# Patient Record
Sex: FEMALE | Race: WHITE | NOT HISPANIC OR LATINO | Employment: FULL TIME | URBAN - NONMETROPOLITAN AREA
[De-identification: names, ages, dates, MRNs, and addresses within clinical notes are randomized per-mention and may not be internally consistent; named-entity substitution may affect disease eponyms.]

---

## 2021-11-08 ENCOUNTER — TRANSCRIBE ORDERS (OUTPATIENT)
Dept: LAB | Facility: HOSPITAL | Age: 56
End: 2021-11-08

## 2021-11-08 ENCOUNTER — LAB (OUTPATIENT)
Dept: LAB | Facility: HOSPITAL | Age: 56
End: 2021-11-08

## 2021-11-08 DIAGNOSIS — Z79.891 ENCOUNTER FOR LONG-TERM METHADONE USE: ICD-10-CM

## 2021-11-08 DIAGNOSIS — Z79.891 ENCOUNTER FOR LONG-TERM METHADONE USE: Primary | ICD-10-CM

## 2021-11-08 PROCEDURE — 82607 VITAMIN B-12: CPT

## 2021-11-08 PROCEDURE — 85652 RBC SED RATE AUTOMATED: CPT

## 2021-11-08 PROCEDURE — 80053 COMPREHEN METABOLIC PANEL: CPT

## 2021-11-08 PROCEDURE — 36415 COLL VENOUS BLD VENIPUNCTURE: CPT

## 2021-11-08 PROCEDURE — 86431 RHEUMATOID FACTOR QUANT: CPT

## 2021-11-08 PROCEDURE — 85027 COMPLETE CBC AUTOMATED: CPT

## 2021-11-08 PROCEDURE — 82306 VITAMIN D 25 HYDROXY: CPT

## 2021-11-09 LAB
25(OH)D3 SERPL-MCNC: 17.1 NG/ML (ref 30–100)
ALBUMIN SERPL-MCNC: 4.3 G/DL (ref 3.5–5.2)
ALBUMIN/GLOB SERPL: 1.8 G/DL
ALP SERPL-CCNC: 44 U/L (ref 39–117)
ALT SERPL W P-5'-P-CCNC: 17 U/L (ref 1–33)
ANION GAP SERPL CALCULATED.3IONS-SCNC: 9.7 MMOL/L (ref 5–15)
AST SERPL-CCNC: 17 U/L (ref 1–32)
BILIRUB SERPL-MCNC: 0.2 MG/DL (ref 0–1.2)
BUN SERPL-MCNC: 28 MG/DL (ref 6–20)
BUN/CREAT SERPL: 28 (ref 7–25)
CALCIUM SPEC-SCNC: 10.1 MG/DL (ref 8.6–10.5)
CHLORIDE SERPL-SCNC: 101 MMOL/L (ref 98–107)
CHROMATIN AB SERPL-ACNC: <10 IU/ML (ref 0–14)
CO2 SERPL-SCNC: 28.3 MMOL/L (ref 22–29)
CREAT SERPL-MCNC: 1 MG/DL (ref 0.57–1)
DEPRECATED RDW RBC AUTO: 45.6 FL (ref 37–54)
ERYTHROCYTE [DISTWIDTH] IN BLOOD BY AUTOMATED COUNT: 13.5 % (ref 12.3–15.4)
ERYTHROCYTE [SEDIMENTATION RATE] IN BLOOD: 21 MM/HR (ref 0–30)
GFR SERPL CREATININE-BSD FRML MDRD: 58 ML/MIN/1.73
GLOBULIN UR ELPH-MCNC: 2.4 GM/DL
GLUCOSE SERPL-MCNC: 97 MG/DL (ref 65–99)
HCT VFR BLD AUTO: 44.2 % (ref 34–46.6)
HGB BLD-MCNC: 14.7 G/DL (ref 12–15.9)
MCH RBC QN AUTO: 30.2 PG (ref 26.6–33)
MCHC RBC AUTO-ENTMCNC: 33.3 G/DL (ref 31.5–35.7)
MCV RBC AUTO: 90.9 FL (ref 79–97)
PLATELET # BLD AUTO: 278 10*3/MM3 (ref 140–450)
PMV BLD AUTO: 11.8 FL (ref 6–12)
POTASSIUM SERPL-SCNC: 4.7 MMOL/L (ref 3.5–5.2)
PROT SERPL-MCNC: 6.7 G/DL (ref 6–8.5)
RBC # BLD AUTO: 4.86 10*6/MM3 (ref 3.77–5.28)
SODIUM SERPL-SCNC: 139 MMOL/L (ref 136–145)
VIT B12 BLD-MCNC: 350 PG/ML (ref 211–946)
WBC # BLD AUTO: 9.63 10*3/MM3 (ref 3.4–10.8)

## 2023-07-28 ENCOUNTER — APPOINTMENT (OUTPATIENT)
Dept: CT IMAGING | Facility: HOSPITAL | Age: 58
DRG: 246 | End: 2023-07-28
Payer: COMMERCIAL

## 2023-07-28 ENCOUNTER — APPOINTMENT (OUTPATIENT)
Dept: GENERAL RADIOLOGY | Facility: HOSPITAL | Age: 58
DRG: 246 | End: 2023-07-28
Payer: COMMERCIAL

## 2023-07-28 ENCOUNTER — HOSPITAL ENCOUNTER (INPATIENT)
Facility: HOSPITAL | Age: 58
LOS: 2 days | Discharge: HOME | DRG: 246 | End: 2023-07-30
Attending: FAMILY MEDICINE | Admitting: INTERNAL MEDICINE
Payer: COMMERCIAL

## 2023-07-28 DIAGNOSIS — I21.4 NSTEMI (NON-ST ELEVATED MYOCARDIAL INFARCTION): Primary | ICD-10-CM

## 2023-07-28 PROBLEM — G89.4 CHRONIC PAIN SYNDROME: Status: ACTIVE | Noted: 2023-07-28

## 2023-07-28 PROBLEM — F17.200 TOBACCO DEPENDENCE: Status: ACTIVE | Noted: 2023-07-28

## 2023-07-28 PROBLEM — I25.9 CHEST PAIN DUE TO MYOCARDIAL ISCHEMIA: Status: ACTIVE | Noted: 2023-07-28

## 2023-07-28 PROBLEM — J44.9 COPD (CHRONIC OBSTRUCTIVE PULMONARY DISEASE): Status: ACTIVE | Noted: 2023-07-28

## 2023-07-28 LAB
ANION GAP SERPL CALCULATED.3IONS-SCNC: 15 MMOL/L (ref 5–15)
APTT PPP: 24.9 SECONDS (ref 24.1–35)
BUN SERPL-MCNC: 8 MG/DL (ref 6–20)
BUN/CREAT SERPL: 13.1 (ref 7–25)
CALCIUM SPEC-SCNC: 9 MG/DL (ref 8.6–10.5)
CHLORIDE SERPL-SCNC: 102 MMOL/L (ref 98–107)
CK SERPL-CCNC: 705 U/L (ref 20–180)
CO2 SERPL-SCNC: 22 MMOL/L (ref 22–29)
CREAT SERPL-MCNC: 0.61 MG/DL (ref 0.57–1)
D DIMER PPP FEU-MCNC: 1.33 MCGFEU/ML (ref 0–0.55)
DEPRECATED RDW RBC AUTO: 49 FL (ref 37–54)
EGFRCR SERPLBLD CKD-EPI 2021: 105.7 ML/MIN/1.73
EOSINOPHIL # BLD MANUAL: 0.24 10*3/MM3 (ref 0–0.4)
EOSINOPHIL NFR BLD MANUAL: 2 % (ref 0.3–6.2)
ERYTHROCYTE [DISTWIDTH] IN BLOOD BY AUTOMATED COUNT: 14.4 % (ref 12.3–15.4)
GEN 5 2HR TROPONIN T REFLEX: 683 NG/L
GLUCOSE SERPL-MCNC: 84 MG/DL (ref 65–99)
HCT VFR BLD AUTO: 38.9 % (ref 34–46.6)
HGB BLD-MCNC: 12.2 G/DL (ref 12–15.9)
INR PPP: 0.85 (ref 0.91–1.09)
LYMPHOCYTES # BLD MANUAL: 4.56 10*3/MM3 (ref 0.7–3.1)
LYMPHOCYTES NFR BLD MANUAL: 6.9 % (ref 5–12)
MAGNESIUM SERPL-MCNC: 2 MG/DL (ref 1.6–2.6)
MCH RBC QN AUTO: 29.4 PG (ref 26.6–33)
MCHC RBC AUTO-ENTMCNC: 31.4 G/DL (ref 31.5–35.7)
MCV RBC AUTO: 93.7 FL (ref 79–97)
MONOCYTES # BLD: 0.82 10*3/MM3 (ref 0.1–0.9)
NEUTROPHILS # BLD AUTO: 6.21 10*3/MM3 (ref 1.7–7)
NEUTROPHILS NFR BLD MANUAL: 52.5 % (ref 42.7–76)
NT-PROBNP SERPL-MCNC: 2373 PG/ML (ref 0–900)
PLAT MORPH BLD: NORMAL
PLATELET # BLD AUTO: 280 10*3/MM3 (ref 140–450)
PMV BLD AUTO: 10 FL (ref 6–12)
POLYCHROMASIA BLD QL SMEAR: ABNORMAL
POTASSIUM SERPL-SCNC: 3.5 MMOL/L (ref 3.5–5.2)
PROTHROMBIN TIME: 11.7 SECONDS (ref 11.8–14.8)
RBC # BLD AUTO: 4.15 10*6/MM3 (ref 3.77–5.28)
SODIUM SERPL-SCNC: 139 MMOL/L (ref 136–145)
TROPONIN T DELTA: 120 NG/L
TROPONIN T SERPL HS-MCNC: 563 NG/L
VARIANT LYMPHS NFR BLD MANUAL: 3 % (ref 0–5)
VARIANT LYMPHS NFR BLD MANUAL: 35.6 % (ref 19.6–45.3)
WBC MORPH BLD: NORMAL
WBC NRBC COR # BLD: 11.82 10*3/MM3 (ref 3.4–10.8)

## 2023-07-28 PROCEDURE — 85730 THROMBOPLASTIN TIME PARTIAL: CPT | Performed by: FAMILY MEDICINE

## 2023-07-28 PROCEDURE — 25010000002 ENOXAPARIN PER 10 MG: Performed by: FAMILY MEDICINE

## 2023-07-28 PROCEDURE — 85007 BL SMEAR W/DIFF WBC COUNT: CPT | Performed by: FAMILY MEDICINE

## 2023-07-28 PROCEDURE — 71275 CT ANGIOGRAPHY CHEST: CPT

## 2023-07-28 PROCEDURE — 93010 ELECTROCARDIOGRAM REPORT: CPT | Performed by: INTERNAL MEDICINE

## 2023-07-28 PROCEDURE — 82550 ASSAY OF CK (CPK): CPT | Performed by: FAMILY MEDICINE

## 2023-07-28 PROCEDURE — 85610 PROTHROMBIN TIME: CPT | Performed by: FAMILY MEDICINE

## 2023-07-28 PROCEDURE — 85379 FIBRIN DEGRADATION QUANT: CPT | Performed by: FAMILY MEDICINE

## 2023-07-28 PROCEDURE — 25510000001 IOPAMIDOL PER 1 ML: Performed by: FAMILY MEDICINE

## 2023-07-28 PROCEDURE — 93005 ELECTROCARDIOGRAM TRACING: CPT | Performed by: NURSE PRACTITIONER

## 2023-07-28 PROCEDURE — 93005 ELECTROCARDIOGRAM TRACING: CPT | Performed by: FAMILY MEDICINE

## 2023-07-28 PROCEDURE — 99285 EMERGENCY DEPT VISIT HI MDM: CPT

## 2023-07-28 PROCEDURE — 83735 ASSAY OF MAGNESIUM: CPT | Performed by: FAMILY MEDICINE

## 2023-07-28 PROCEDURE — 80048 BASIC METABOLIC PNL TOTAL CA: CPT | Performed by: FAMILY MEDICINE

## 2023-07-28 PROCEDURE — 71045 X-RAY EXAM CHEST 1 VIEW: CPT

## 2023-07-28 PROCEDURE — 85025 COMPLETE CBC W/AUTO DIFF WBC: CPT | Performed by: FAMILY MEDICINE

## 2023-07-28 PROCEDURE — 99222 1ST HOSP IP/OBS MODERATE 55: CPT | Performed by: INTERNAL MEDICINE

## 2023-07-28 PROCEDURE — 84484 ASSAY OF TROPONIN QUANT: CPT | Performed by: FAMILY MEDICINE

## 2023-07-28 PROCEDURE — 83880 ASSAY OF NATRIURETIC PEPTIDE: CPT | Performed by: FAMILY MEDICINE

## 2023-07-28 PROCEDURE — 25010000002 NITROGLYCERIN 200 MCG/ML SOLUTION: Performed by: FAMILY MEDICINE

## 2023-07-28 RX ORDER — AMOXICILLIN 250 MG
1 CAPSULE ORAL NIGHTLY PRN
Status: DISCONTINUED | OUTPATIENT
Start: 2023-07-28 | End: 2023-07-30 | Stop reason: HOSPADM

## 2023-07-28 RX ORDER — HYDROCODONE BITARTRATE AND ACETAMINOPHEN 10; 325 MG/1; MG/1
1 TABLET ORAL EVERY 6 HOURS PRN
Status: DISCONTINUED | OUTPATIENT
Start: 2023-07-28 | End: 2023-07-30 | Stop reason: HOSPADM

## 2023-07-28 RX ORDER — FLUOXETINE HYDROCHLORIDE 20 MG/1
40 CAPSULE ORAL DAILY
COMMUNITY

## 2023-07-28 RX ORDER — IBUPROFEN 800 MG/1
800 TABLET ORAL EVERY 6 HOURS PRN
COMMUNITY
End: 2023-07-30 | Stop reason: HOSPADM

## 2023-07-28 RX ORDER — TOPIRAMATE 50 MG/1
50 TABLET, FILM COATED ORAL 2 TIMES DAILY
COMMUNITY

## 2023-07-28 RX ORDER — FLUOXETINE HYDROCHLORIDE 20 MG/1
40 CAPSULE ORAL DAILY
Status: DISCONTINUED | OUTPATIENT
Start: 2023-07-29 | End: 2023-07-30 | Stop reason: HOSPADM

## 2023-07-28 RX ORDER — POLYETHYLENE GLYCOL 3350 17 G/17G
17 POWDER, FOR SOLUTION ORAL DAILY PRN
Status: DISCONTINUED | OUTPATIENT
Start: 2023-07-28 | End: 2023-07-30 | Stop reason: HOSPADM

## 2023-07-28 RX ORDER — ASPIRIN 81 MG/1
81 TABLET ORAL DAILY
Status: DISCONTINUED | OUTPATIENT
Start: 2023-07-28 | End: 2023-07-30

## 2023-07-28 RX ORDER — BISACODYL 10 MG
10 SUPPOSITORY, RECTAL RECTAL DAILY PRN
Status: DISCONTINUED | OUTPATIENT
Start: 2023-07-28 | End: 2023-07-30 | Stop reason: HOSPADM

## 2023-07-28 RX ORDER — METHYLPREDNISOLONE 4 MG/1
TABLET ORAL 2 TIMES DAILY
COMMUNITY
End: 2023-07-30 | Stop reason: HOSPADM

## 2023-07-28 RX ORDER — POTASSIUM CHLORIDE 750 MG/1
20 CAPSULE, EXTENDED RELEASE ORAL ONCE
Status: COMPLETED | OUTPATIENT
Start: 2023-07-28 | End: 2023-07-28

## 2023-07-28 RX ORDER — SODIUM CHLORIDE 9 MG/ML
40 INJECTION, SOLUTION INTRAVENOUS AS NEEDED
Status: DISCONTINUED | OUTPATIENT
Start: 2023-07-28 | End: 2023-07-29 | Stop reason: SDUPTHER

## 2023-07-28 RX ORDER — TOPIRAMATE 25 MG/1
50 TABLET ORAL 2 TIMES DAILY
Status: DISCONTINUED | OUTPATIENT
Start: 2023-07-28 | End: 2023-07-30 | Stop reason: HOSPADM

## 2023-07-28 RX ORDER — CARVEDILOL 6.25 MG/1
6.25 TABLET ORAL 2 TIMES DAILY WITH MEALS
Status: DISCONTINUED | OUTPATIENT
Start: 2023-07-28 | End: 2023-07-30 | Stop reason: HOSPADM

## 2023-07-28 RX ORDER — ONDANSETRON 4 MG/1
4 TABLET, FILM COATED ORAL EVERY 6 HOURS PRN
Status: DISCONTINUED | OUTPATIENT
Start: 2023-07-28 | End: 2023-07-29 | Stop reason: SDUPTHER

## 2023-07-28 RX ORDER — ONDANSETRON 2 MG/ML
4 INJECTION INTRAMUSCULAR; INTRAVENOUS EVERY 6 HOURS PRN
Status: DISCONTINUED | OUTPATIENT
Start: 2023-07-28 | End: 2023-07-29 | Stop reason: SDUPTHER

## 2023-07-28 RX ORDER — PANTOPRAZOLE SODIUM 40 MG/1
40 TABLET, DELAYED RELEASE ORAL 2 TIMES DAILY
COMMUNITY

## 2023-07-28 RX ORDER — CHLORHEXIDINE GLUCONATE 500 MG/1
1 CLOTH TOPICAL ONCE
Status: COMPLETED | OUTPATIENT
Start: 2023-07-28 | End: 2023-07-29

## 2023-07-28 RX ORDER — HYDROCODONE BITARTRATE AND ACETAMINOPHEN 10; 325 MG/1; MG/1
1 TABLET ORAL EVERY 6 HOURS PRN
COMMUNITY

## 2023-07-28 RX ORDER — SODIUM CHLORIDE 0.9 % (FLUSH) 0.9 %
10 SYRINGE (ML) INJECTION AS NEEDED
Status: DISCONTINUED | OUTPATIENT
Start: 2023-07-28 | End: 2023-07-29 | Stop reason: SDUPTHER

## 2023-07-28 RX ORDER — FUROSEMIDE 40 MG/1
40 TABLET ORAL DAILY PRN
COMMUNITY

## 2023-07-28 RX ORDER — PANTOPRAZOLE SODIUM 40 MG/1
40 TABLET, DELAYED RELEASE ORAL 2 TIMES DAILY
Status: DISCONTINUED | OUTPATIENT
Start: 2023-07-28 | End: 2023-07-30 | Stop reason: HOSPADM

## 2023-07-28 RX ORDER — ZOLPIDEM TARTRATE 10 MG/1
10 TABLET ORAL NIGHTLY PRN
COMMUNITY

## 2023-07-28 RX ORDER — GABAPENTIN 300 MG/1
300 CAPSULE ORAL 3 TIMES DAILY
Status: DISCONTINUED | OUTPATIENT
Start: 2023-07-28 | End: 2023-07-30 | Stop reason: HOSPADM

## 2023-07-28 RX ORDER — BISACODYL 5 MG/1
5 TABLET, DELAYED RELEASE ORAL DAILY PRN
Status: DISCONTINUED | OUTPATIENT
Start: 2023-07-28 | End: 2023-07-30 | Stop reason: HOSPADM

## 2023-07-28 RX ORDER — SODIUM CHLORIDE 0.9 % (FLUSH) 0.9 %
10 SYRINGE (ML) INJECTION EVERY 12 HOURS SCHEDULED
Status: DISCONTINUED | OUTPATIENT
Start: 2023-07-28 | End: 2023-07-29 | Stop reason: SDUPTHER

## 2023-07-28 RX ORDER — NICOTINE 21 MG/24HR
1 PATCH, TRANSDERMAL 24 HOURS TRANSDERMAL
Status: DISCONTINUED | OUTPATIENT
Start: 2023-07-29 | End: 2023-07-30 | Stop reason: HOSPADM

## 2023-07-28 RX ORDER — NITROGLYCERIN 20 MG/100ML
5-200 INJECTION INTRAVENOUS
Status: DISCONTINUED | OUTPATIENT
Start: 2023-07-28 | End: 2023-07-30

## 2023-07-28 RX ORDER — SODIUM CHLORIDE 0.9 % (FLUSH) 0.9 %
10 SYRINGE (ML) INJECTION AS NEEDED
Status: DISCONTINUED | OUTPATIENT
Start: 2023-07-28 | End: 2023-07-30 | Stop reason: HOSPADM

## 2023-07-28 RX ORDER — NITROGLYCERIN 0.4 MG/1
0.4 TABLET SUBLINGUAL
Status: DISCONTINUED | OUTPATIENT
Start: 2023-07-28 | End: 2023-07-29

## 2023-07-28 RX ORDER — SPIRONOLACTONE 25 MG/1
25 TABLET ORAL DAILY
Status: DISCONTINUED | OUTPATIENT
Start: 2023-07-28 | End: 2023-07-30 | Stop reason: HOSPADM

## 2023-07-28 RX ORDER — ZOLPIDEM TARTRATE 5 MG/1
10 TABLET ORAL NIGHTLY PRN
Status: DISCONTINUED | OUTPATIENT
Start: 2023-07-28 | End: 2023-07-30 | Stop reason: HOSPADM

## 2023-07-28 RX ORDER — ONDANSETRON 4 MG/1
4 TABLET, FILM COATED ORAL EVERY 8 HOURS PRN
COMMUNITY

## 2023-07-28 RX ORDER — ENOXAPARIN SODIUM 100 MG/ML
1 INJECTION SUBCUTANEOUS EVERY 12 HOURS SCHEDULED
Status: DISCONTINUED | OUTPATIENT
Start: 2023-07-29 | End: 2023-07-29

## 2023-07-28 RX ORDER — ACETAMINOPHEN 160 MG/5ML
650 SOLUTION ORAL EVERY 4 HOURS PRN
Status: DISCONTINUED | OUTPATIENT
Start: 2023-07-28 | End: 2023-07-30 | Stop reason: HOSPADM

## 2023-07-28 RX ORDER — GABAPENTIN 300 MG/1
300 CAPSULE ORAL 3 TIMES DAILY
COMMUNITY

## 2023-07-28 RX ORDER — CHLORHEXIDINE GLUCONATE 500 MG/1
1 CLOTH TOPICAL EVERY 24 HOURS
Status: DISCONTINUED | OUTPATIENT
Start: 2023-07-29 | End: 2023-07-30 | Stop reason: HOSPADM

## 2023-07-28 RX ORDER — ENOXAPARIN SODIUM 100 MG/ML
1 INJECTION SUBCUTANEOUS ONCE
Status: COMPLETED | OUTPATIENT
Start: 2023-07-28 | End: 2023-07-28

## 2023-07-28 RX ORDER — PREDNISONE 10 MG/1
10 TABLET ORAL
Status: DISCONTINUED | OUTPATIENT
Start: 2023-07-29 | End: 2023-07-30 | Stop reason: HOSPADM

## 2023-07-28 RX ORDER — ACETAMINOPHEN 650 MG/1
650 SUPPOSITORY RECTAL EVERY 4 HOURS PRN
Status: DISCONTINUED | OUTPATIENT
Start: 2023-07-28 | End: 2023-07-30 | Stop reason: HOSPADM

## 2023-07-28 RX ORDER — ACETAMINOPHEN 325 MG/1
650 TABLET ORAL EVERY 4 HOURS PRN
Status: DISCONTINUED | OUTPATIENT
Start: 2023-07-28 | End: 2023-07-29 | Stop reason: SDUPTHER

## 2023-07-28 RX ADMIN — POTASSIUM CHLORIDE 20 MEQ: 10 CAPSULE, COATED, EXTENDED RELEASE ORAL at 23:31

## 2023-07-28 RX ADMIN — ZOLPIDEM TARTRATE 10 MG: 5 TABLET ORAL at 23:31

## 2023-07-28 RX ADMIN — CARVEDILOL 6.25 MG: 6.25 TABLET, FILM COATED ORAL at 23:31

## 2023-07-28 RX ADMIN — Medication 10 ML: at 23:33

## 2023-07-28 RX ADMIN — PANTOPRAZOLE SODIUM 40 MG: 40 TABLET, DELAYED RELEASE ORAL at 23:31

## 2023-07-28 RX ADMIN — GABAPENTIN 300 MG: 300 CAPSULE ORAL at 23:31

## 2023-07-28 RX ADMIN — IOPAMIDOL 100 ML: 755 INJECTION, SOLUTION INTRAVENOUS at 18:52

## 2023-07-28 RX ADMIN — TOPIRAMATE 50 MG: 25 TABLET, FILM COATED ORAL at 23:31

## 2023-07-28 RX ADMIN — NITROGLYCERIN 5 MCG/MIN: 20 INJECTION INTRAVENOUS at 19:07

## 2023-07-28 RX ADMIN — ENOXAPARIN SODIUM 70 MG: 100 INJECTION SUBCUTANEOUS at 21:04

## 2023-07-28 RX ADMIN — SPIRONOLACTONE 25 MG: 25 TABLET ORAL at 23:31

## 2023-07-28 RX ADMIN — HYDROCODONE BITARTRATE AND ACETAMINOPHEN 1 TABLET: 10; 325 TABLET ORAL at 23:30

## 2023-07-28 NOTE — LETTER
July 30, 2023     Patient: Alannah Barr   YOB: 1965   Date of Visit: 7/28/2023       To Whom It May Concern:    It is my medical opinion that Alannah Barr should be excused from work 7/28/23-8/6/23. She can return to work 8/7/23.           Sincerely,        Rain Broussard RN

## 2023-07-28 NOTE — Clinical Note
First balloon inflation max pressure = 12 tere. First balloon inflation duration = 20 seconds. Second inflation of balloon - Max pressure = 18 tere. 2nd Inflation of balloon - Duration = 20 seconds. 2nd inflation was done at 10:52 CDT. Third inflation of balloon - Max pressure = 18 tere. 3rd Inflation of balloon - Duration = 20 seconds. 3rd inflation was done at 10:52 CDT.

## 2023-07-28 NOTE — Clinical Note
First balloon inflation max pressure = 6 tere. First balloon inflation duration = 20 seconds. Second inflation of balloon - Max pressure = 8 tere. 2nd Inflation of balloon - Duration = 20 seconds. 2nd inflation was done at 10:22 CDT. Third inflation of balloon - Max pressure = 12 tere. 3rd Inflation of balloon - Duration = 20 seconds.

## 2023-07-28 NOTE — Clinical Note
First balloon inflation max pressure = 14 tere. First balloon inflation duration = 25 seconds. Second inflation of balloon - Max pressure = 14 tere. 2nd Inflation of balloon - Duration = 20 seconds. Third inflation of balloon - Max pressure = 16 tere. 3rd Inflation of balloon - Duration = 20 seconds. Fourth inflation of balloon - Max pressure = 18 tere. 4th Inflation of balloon - Duration = 25 seconds.

## 2023-07-28 NOTE — Clinical Note
First balloon inflation max pressure = 6 tere. First balloon inflation duration = 20 seconds. Second inflation of balloon - Max pressure = 2 tere. 2nd Inflation of balloon - Duration = 20 seconds. 2nd inflation was done at 10:10 CDT.

## 2023-07-28 NOTE — ED PROVIDER NOTES
Subjective   History of Present Illness  57-year-old female was transferred here from an outside hospital.  Patient was found to have an elevated troponin level.  Patient's been having chest pain for last few days.  Patient states that it worsened last night.  Patient states that she started feel nauseous.  Patient states that chest pain radiates to her back.  Patient denies any fevers or chills.  Patient denies any vomiting.  Patient denies any abdominal pain.  Patient denies any other symptoms at this time.    Review of Systems   Cardiovascular:  Positive for chest pain.   Gastrointestinal:  Positive for nausea.   Musculoskeletal:  Positive for back pain.   All other systems reviewed and are negative.    Past Medical History:   Diagnosis Date    CHF (congestive heart failure)     COPD (chronic obstructive pulmonary disease)        Allergies   Allergen Reactions    Levaquin [Levofloxacin] Shortness Of Breath    Morphine Shortness Of Breath    Codeine Unknown - High Severity    Doxycycline Unknown - High Severity    Metronidazole Unknown - High Severity    Naproxen Unknown - High Severity    Propoxyphene Unknown - High Severity    Tetanus Toxoids Unknown - High Severity    Tetracyclines & Related Unknown - High Severity       History reviewed. No pertinent surgical history.    History reviewed. No pertinent family history.    Social History     Socioeconomic History    Marital status: Single   Tobacco Use    Smoking status: Every Day     Types: Cigarettes           Objective   Physical Exam  Vitals and nursing note reviewed.   Constitutional:       Appearance: She is well-developed.   HENT:      Head: Normocephalic and atraumatic.   Cardiovascular:      Rate and Rhythm: Normal rate and regular rhythm.      Heart sounds: Normal heart sounds.   Pulmonary:      Effort: Pulmonary effort is normal.      Breath sounds: Normal breath sounds.   Abdominal:      General: Bowel sounds are normal.      Palpations: Abdomen is  soft.      Tenderness: There is no abdominal tenderness.   Skin:     General: Skin is warm and dry.   Neurological:      General: No focal deficit present.      Mental Status: She is alert and oriented to person, place, and time.   Psychiatric:         Mood and Affect: Mood normal.         Behavior: Behavior normal.       Procedures           ED Course  ED Course as of 07/28/23 2029 Fri Jul 28, 2023   1852 EKG rate 76  Normal sinus rhythm  No STEMI [RP]      ED Course User Index  [RP] Raz Ochoa MD                      HEART Score: 6                    Lab Results (last 24 hours)       Procedure Component Value Units Date/Time    CBC & Differential [[id number]]  (Abnormal) Collected: 07/28/23 1817    Specimen: Blood Updated: 07/28/23 1856    Narrative:      The following orders were created for panel order CBC & Differential.  Procedure                               Abnormality         Status                     ---------                               -----------         ------                     CBC Auto Differential[[id number]]        Abnormal            Final result                 Please view results for these tests on the individual orders.    Protime-INR [[id number]]  (Abnormal) Collected: 07/28/23 1817    Specimen: Blood Updated: 07/28/23 1841     Protime 11.7 Seconds      INR 0.85    aPTT [[id number]]  (Normal) Collected: 07/28/23 1817    Specimen: Blood Updated: 07/28/23 1841     PTT 24.9 seconds     Basic Metabolic Panel [[id number]]  (Normal) Collected: 07/28/23 1817    Specimen: Blood Updated: 07/28/23 1849     Glucose 84 mg/dL      BUN 8 mg/dL      Creatinine 0.61 mg/dL      Sodium 139 mmol/L      Potassium 3.5 mmol/L      Chloride 102 mmol/L      CO2 22.0 mmol/L      Calcium 9.0 mg/dL      BUN/Creatinine Ratio 13.1     Anion Gap 15.0 mmol/L      eGFR 105.7 mL/min/1.73     Narrative:      GFR Normal >60  Chronic Kidney Disease <60  Kidney Failure <15      BNP [[id number]]  (Abnormal) Collected: 07/28/23  "1817    Specimen: Blood Updated: 07/28/23 1848     proBNP 2,373.0 pg/mL     Narrative:      Among patients with dyspnea, NT-proBNP is highly sensitive for the detection of acute congestive heart failure. In addition NT-proBNP of <300 pg/ml effectively rules out acute congestive heart failure with 99% negative predictive value.    Results may be falsely decreased if patient taking Biotin.      D-dimer, Quantitative [[id number]]  (Abnormal) Collected: 07/28/23 1817    Specimen: Blood Updated: 07/28/23 1841     D-Dimer, Quantitative 1.33 MCGFEU/mL     Narrative:      According to the assay 's published package insert, a normal (<0.50 MCGFEU/mL) D-dimer result in conjunction with a non-high clinical probability assessment, excludes deep vein thrombosis (DVT) and pulmonary embolism (PE) with high sensitivity.    D-dimer values increase with age and this can make VTE exclusion of an older population difficult. To address this, the [employer], based on best available evidence and recent guidelines, recommends that clinicians use age-adjusted D-dimer thresholds in patients greater than 50 years of age with: a) a low probability of PE who do not meet all Pulmonary Embolism Rule Out Criteria, or b) in those with intermediate probability of PE.   The formula for an age-adjusted D-dimer cut-off is \"age/100\".  For example, a 60 year old patient would have an age-adjusted cut-off of 0.60 MCGFEU/mL and an 80 year old 0.80 MCGFEU/mL.    High Sensitivity Troponin T [[id number]]  (Abnormal) Collected: 07/28/23 1817    Specimen: Blood Updated: 07/28/23 1849     HS Troponin T 563 ng/L     Narrative:      High Sensitive Troponin T Reference Range:  <10.0 ng/L- Negative Female for AMI  <15.0 ng/L- Negative Male for AMI  >=10 - Abnormal Female indicating possible myocardial injury.  >=15 - Abnormal Male indicating possible myocardial injury.   Clinicians would have to utilize clinical acumen, EKG, Troponin, " and serial changes to determine if it is an Acute Myocardial Infarction or myocardial injury due to an underlying chronic condition.         CK [[id number]]  (Abnormal) Collected: 07/28/23 1817    Specimen: Blood Updated: 07/28/23 1849     Creatine Kinase 705 U/L     Magnesium [[id number]]  (Normal) Collected: 07/28/23 1817    Specimen: Blood Updated: 07/28/23 1849     Magnesium 2.0 mg/dL     CBC Auto Differential [[id number]]  (Abnormal) Collected: 07/28/23 1817    Specimen: Blood Updated: 07/28/23 1856     WBC 11.82 10*3/mm3      RBC 4.15 10*6/mm3      Hemoglobin 12.2 g/dL      Hematocrit 38.9 %      MCV 93.7 fL      MCH 29.4 pg      MCHC 31.4 g/dL      RDW 14.4 %      RDW-SD 49.0 fl      MPV 10.0 fL      Platelets 280 10*3/mm3     Narrative:      The previously reported component NRBC is no longer being reported. Previous result was 0.0 /100 WBC (Reference Range: 0.0-0.2 /100 WBC) on 7/28/2023 at 1830 CDT.    Manual Differential [[id number]]  (Abnormal) Collected: 07/28/23 1817    Specimen: Blood Updated: 07/28/23 1856     Neutrophil % 52.5 %      Lymphocyte % 35.6 %      Monocyte % 6.9 %      Eosinophil % 2.0 %      Atypical Lymphocyte % 3.0 %      Neutrophils Absolute 6.21 10*3/mm3      Lymphocytes Absolute 4.56 10*3/mm3      Monocytes Absolute 0.82 10*3/mm3      Eosinophils Absolute 0.24 10*3/mm3      Polychromasia Mod/2+     WBC Morphology Normal     Platelet Morphology Normal    High Sensitivity Troponin T 2Hr [[id number]]  (Abnormal) Collected: 07/28/23 1959    Specimen: Blood Updated: 07/28/23 2029     HS Troponin T 683 ng/L      Troponin T Delta 120 ng/L     Narrative:      High Sensitive Troponin T Reference Range:  <10.0 ng/L- Negative Female for AMI  <15.0 ng/L- Negative Male for AMI  >=10 - Abnormal Female indicating possible myocardial injury.  >=15 - Abnormal Male indicating possible myocardial injury.   Clinicians would have to utilize clinical acumen, EKG, Troponin, and serial changes to determine if  it is an Acute Myocardial Infarction or myocardial injury due to an underlying chronic condition.                 CT Angiogram Chest   Final Result   1. No evidence of pulmonary thromboembolic disease. The thoracic aorta   is normal in caliber with no evidence of dissection or aneurysm. The   lungs are clear.   2. Coronary artery calcifications are present. No evidence of   cardiomegaly or pericardial effusion.   This report was finalized on 07/28/2023 19:18 by Dr. Devin Cornejo MD.      XR Chest 1 View   Final Result   Impression: No evidence of acute cardiopulmonary disease.   This report was finalized on 07/28/2023 18:43 by Dr. Devin Cornejo MD.          Medications   sodium chloride 0.9 % flush 10 mL (has no administration in time range)   nitroglycerin (TRIDIL) 200 mcg/ml infusion (10 mcg/min Intravenous Rate/Dose Change 7/28/23 1936)   Enoxaparin Sodium (LOVENOX) syringe 70 mg (has no administration in time range)   iopamidol (ISOVUE-370) 76 % injection 100 mL (100 mL Intravenous Given 7/28/23 1852)       Medical Decision Making  57-year-old female was transferred here from an outside hospital.  Patient was found to have an elevated troponin.  Patient troponin level was 563.  Patient was given Lovenox here in the emergency room.  Patient was started nitroglycerin drip.  Patient's chest pain was at a 3 out of 10 on the nitroglycerin drip.  I discussed case with Dr. Willett.  He has recommended patient be placed in the cardiac critical care unit.  I discussed case with Dr. Serrano who has accepted the patient under his services.    Problems Addressed:  NSTEMI (non-ST elevated myocardial infarction): complicated acute illness or injury    Amount and/or Complexity of Data Reviewed  Labs: ordered. Decision-making details documented in ED Course.  Radiology: ordered. Decision-making details documented in ED Course.  ECG/medicine tests: ordered. Decision-making details documented in ED  Course.  Discussion of management or test interpretation with external provider(s): Dr. Willett - Cardiology     Risk  Prescription drug management.  Decision regarding hospitalization.        Final diagnoses:   NSTEMI (non-ST elevated myocardial infarction)       ED Disposition  ED Disposition       ED Disposition   Decision to Admit    Condition   --    Comment   Level of Care: Critical Care [6]   Diagnosis: NSTEMI (non-ST elevated myocardial infarction) [892386]   Admitting Physician: KAMILAH QUINTANA [[id number]]   Attending Physician: KAMILAH QUINTANA [[id number]]   Bed Request Comments: Cardiac critical care unit   Certification: I Certify That Inpatient Hospital Services Are Medically Necessary For Greater Than 2 Midnights                 No follow-up provider specified.       Medication List      No changes were made to your prescriptions during this visit.            Raz Ochoa MD  07/28/23 2029

## 2023-07-28 NOTE — Clinical Note
First balloon inflation max pressure = 12 tere. First balloon inflation duration = 20 seconds. Second inflation of balloon - Max pressure = 16 tere. 2nd Inflation of balloon - Duration = 20 seconds. 2nd inflation was done at 10:55 CDT.

## 2023-07-29 ENCOUNTER — APPOINTMENT (OUTPATIENT)
Dept: CARDIOLOGY | Facility: HOSPITAL | Age: 58
DRG: 246 | End: 2023-07-29
Payer: COMMERCIAL

## 2023-07-29 LAB
ANION GAP SERPL CALCULATED.3IONS-SCNC: 11 MMOL/L (ref 5–15)
BH CV ECHO MEAS - AO MAX PG: 9.5 MMHG
BH CV ECHO MEAS - AO MEAN PG: 5 MMHG
BH CV ECHO MEAS - AO ROOT DIAM: 2.5 CM
BH CV ECHO MEAS - AO V2 MAX: 154 CM/SEC
BH CV ECHO MEAS - AO V2 VTI: 27.3 CM
BH CV ECHO MEAS - AVA(I,D): 2.05 CM2
BH CV ECHO MEAS - EDV(CUBED): 109.9 ML
BH CV ECHO MEAS - EDV(MOD-SP4): 59.5 ML
BH CV ECHO MEAS - EF(MOD-SP4): 50.8 %
BH CV ECHO MEAS - ESV(CUBED): 43.6 ML
BH CV ECHO MEAS - ESV(MOD-SP4): 29.3 ML
BH CV ECHO MEAS - FS: 26.5 %
BH CV ECHO MEAS - IVS/LVPW: 0.68 CM
BH CV ECHO MEAS - IVSD: 0.74 CM
BH CV ECHO MEAS - LA DIMENSION: 2.9 CM
BH CV ECHO MEAS - LAT PEAK E' VEL: 8.3 CM/SEC
BH CV ECHO MEAS - LV DIASTOLIC VOL/BSA (35-75): 35.6 CM2
BH CV ECHO MEAS - LV MASS(C)D: 150.1 GRAMS
BH CV ECHO MEAS - LV MAX PG: 4.8 MMHG
BH CV ECHO MEAS - LV MEAN PG: 2 MMHG
BH CV ECHO MEAS - LV SYSTOLIC VOL/BSA (12-30): 17.5 CM2
BH CV ECHO MEAS - LV V1 MAX: 110 CM/SEC
BH CV ECHO MEAS - LV V1 VTI: 19.7 CM
BH CV ECHO MEAS - LVIDD: 4.8 CM
BH CV ECHO MEAS - LVIDS: 3.5 CM
BH CV ECHO MEAS - LVOT AREA: 2.8 CM2
BH CV ECHO MEAS - LVOT DIAM: 1.9 CM
BH CV ECHO MEAS - LVPWD: 1.09 CM
BH CV ECHO MEAS - MED PEAK E' VEL: 6.3 CM/SEC
BH CV ECHO MEAS - MV A MAX VEL: 110 CM/SEC
BH CV ECHO MEAS - MV DEC TIME: 0.23 MSEC
BH CV ECHO MEAS - MV E MAX VEL: 68.9 CM/SEC
BH CV ECHO MEAS - MV E/A: 0.63
BH CV ECHO MEAS - SI(MOD-SP4): 18.1 ML/M2
BH CV ECHO MEAS - SV(LVOT): 55.9 ML
BH CV ECHO MEAS - SV(MOD-SP4): 30.2 ML
BH CV ECHO MEASUREMENTS AVERAGE E/E' RATIO: 9.44
BUN SERPL-MCNC: 12 MG/DL (ref 6–20)
BUN/CREAT SERPL: 17.9 (ref 7–25)
CALCIUM SPEC-SCNC: 8.7 MG/DL (ref 8.6–10.5)
CHLORIDE SERPL-SCNC: 104 MMOL/L (ref 98–107)
CHOLEST SERPL-MCNC: 264 MG/DL (ref 0–200)
CK SERPL-CCNC: 828 U/L (ref 20–180)
CO2 SERPL-SCNC: 26 MMOL/L (ref 22–29)
CREAT SERPL-MCNC: 0.67 MG/DL (ref 0.57–1)
CRP SERPL-MCNC: 1.11 MG/DL (ref 0–0.5)
DEPRECATED RDW RBC AUTO: 50.7 FL (ref 37–54)
EGFRCR SERPLBLD CKD-EPI 2021: 103.4 ML/MIN/1.73
ERYTHROCYTE [DISTWIDTH] IN BLOOD BY AUTOMATED COUNT: 14.2 % (ref 12.3–15.4)
ERYTHROCYTE [SEDIMENTATION RATE] IN BLOOD: 61 MM/HR (ref 0–30)
GLUCOSE SERPL-MCNC: 109 MG/DL (ref 65–99)
HBA1C MFR BLD: 6.1 % (ref 4.8–5.6)
HCT VFR BLD AUTO: 38.9 % (ref 34–46.6)
HDLC SERPL-MCNC: 60 MG/DL (ref 40–60)
HGB BLD-MCNC: 11.9 G/DL (ref 12–15.9)
LDLC SERPL CALC-MCNC: 154 MG/DL (ref 0–100)
LDLC/HDLC SERPL: 2.49 {RATIO}
LEFT ATRIUM VOLUME INDEX: 18.1 ML/M2
LEFT ATRIUM VOLUME: 30.2 ML
MCH RBC QN AUTO: 29.7 PG (ref 26.6–33)
MCHC RBC AUTO-ENTMCNC: 30.6 G/DL (ref 31.5–35.7)
MCV RBC AUTO: 97 FL (ref 79–97)
PLATELET # BLD AUTO: 259 10*3/MM3 (ref 140–450)
PMV BLD AUTO: 10.2 FL (ref 6–12)
POTASSIUM SERPL-SCNC: 4.2 MMOL/L (ref 3.5–5.2)
RBC # BLD AUTO: 4.01 10*6/MM3 (ref 3.77–5.28)
SODIUM SERPL-SCNC: 141 MMOL/L (ref 136–145)
TRIGL SERPL-MCNC: 273 MG/DL (ref 0–150)
TROPONIN T SERPL HS-MCNC: 760 NG/L
TROPONIN T SERPL HS-MCNC: 825 NG/L
VLDLC SERPL-MCNC: 50 MG/DL (ref 5–40)
WBC NRBC COR # BLD: 10.79 10*3/MM3 (ref 3.4–10.8)

## 2023-07-29 PROCEDURE — 93458 L HRT ARTERY/VENTRICLE ANGIO: CPT | Performed by: EMERGENCY MEDICINE

## 2023-07-29 PROCEDURE — 85652 RBC SED RATE AUTOMATED: CPT | Performed by: EMERGENCY MEDICINE

## 2023-07-29 PROCEDURE — 25010000002 DIPHENHYDRAMINE PER 50 MG: Performed by: EMERGENCY MEDICINE

## 2023-07-29 PROCEDURE — C9600 PERC DRUG-EL COR STENT SING: HCPCS | Performed by: EMERGENCY MEDICINE

## 2023-07-29 PROCEDURE — 93005 ELECTROCARDIOGRAM TRACING: CPT | Performed by: NURSE PRACTITIONER

## 2023-07-29 PROCEDURE — C1725 CATH, TRANSLUMIN NON-LASER: HCPCS | Performed by: EMERGENCY MEDICINE

## 2023-07-29 PROCEDURE — 25010000002 MIDAZOLAM PER 1 MG: Performed by: EMERGENCY MEDICINE

## 2023-07-29 PROCEDURE — 93306 TTE W/DOPPLER COMPLETE: CPT

## 2023-07-29 PROCEDURE — C1769 GUIDE WIRE: HCPCS | Performed by: EMERGENCY MEDICINE

## 2023-07-29 PROCEDURE — 63710000001 PREDNISONE PER 5 MG: Performed by: NURSE PRACTITIONER

## 2023-07-29 PROCEDURE — 92928 PRQ TCAT PLMT NTRAC ST 1 LES: CPT | Performed by: EMERGENCY MEDICINE

## 2023-07-29 PROCEDURE — 25010000002 BIVALIRUDIN TRIFLUOROACETATE 250 MG RECONSTITUTED SOLUTION: Performed by: EMERGENCY MEDICINE

## 2023-07-29 PROCEDURE — 83036 HEMOGLOBIN GLYCOSYLATED A1C: CPT | Performed by: NURSE PRACTITIONER

## 2023-07-29 PROCEDURE — C1760 CLOSURE DEV, VASC: HCPCS | Performed by: EMERGENCY MEDICINE

## 2023-07-29 PROCEDURE — 93010 ELECTROCARDIOGRAM REPORT: CPT | Performed by: INTERNAL MEDICINE

## 2023-07-29 PROCEDURE — 86140 C-REACTIVE PROTEIN: CPT | Performed by: INTERNAL MEDICINE

## 2023-07-29 PROCEDURE — C1894 INTRO/SHEATH, NON-LASER: HCPCS | Performed by: EMERGENCY MEDICINE

## 2023-07-29 PROCEDURE — 85027 COMPLETE CBC AUTOMATED: CPT | Performed by: NURSE PRACTITIONER

## 2023-07-29 PROCEDURE — C1874 STENT, COATED/COV W/DEL SYS: HCPCS | Performed by: EMERGENCY MEDICINE

## 2023-07-29 PROCEDURE — 027136Z DILATION OF CORONARY ARTERY, TWO ARTERIES WITH THREE DRUG-ELUTING INTRALUMINAL DEVICES, PERCUTANEOUS APPROACH: ICD-10-PCS | Performed by: EMERGENCY MEDICINE

## 2023-07-29 PROCEDURE — 93306 TTE W/DOPPLER COMPLETE: CPT | Performed by: INTERNAL MEDICINE

## 2023-07-29 PROCEDURE — B2151ZZ FLUOROSCOPY OF LEFT HEART USING LOW OSMOLAR CONTRAST: ICD-10-PCS | Performed by: EMERGENCY MEDICINE

## 2023-07-29 PROCEDURE — 84484 ASSAY OF TROPONIN QUANT: CPT | Performed by: NURSE PRACTITIONER

## 2023-07-29 PROCEDURE — 94799 UNLISTED PULMONARY SVC/PX: CPT

## 2023-07-29 PROCEDURE — 4A023N7 MEASUREMENT OF CARDIAC SAMPLING AND PRESSURE, LEFT HEART, PERCUTANEOUS APPROACH: ICD-10-PCS | Performed by: EMERGENCY MEDICINE

## 2023-07-29 PROCEDURE — C9601 PERC DRUG-EL COR STENT BRAN: HCPCS | Performed by: EMERGENCY MEDICINE

## 2023-07-29 PROCEDURE — 25010000002 HEPARIN (PORCINE) 1000-0.9 UT/500ML-% SOLUTION: Performed by: EMERGENCY MEDICINE

## 2023-07-29 PROCEDURE — B2111ZZ FLUOROSCOPY OF MULTIPLE CORONARY ARTERIES USING LOW OSMOLAR CONTRAST: ICD-10-PCS | Performed by: EMERGENCY MEDICINE

## 2023-07-29 PROCEDURE — 25010000002 HEPARIN (PORCINE) 2000-0.9 UNIT/L-% SOLUTION: Performed by: EMERGENCY MEDICINE

## 2023-07-29 PROCEDURE — 82550 ASSAY OF CK (CPK): CPT | Performed by: NURSE PRACTITIONER

## 2023-07-29 PROCEDURE — 25010000002 ENOXAPARIN PER 10 MG: Performed by: NURSE PRACTITIONER

## 2023-07-29 PROCEDURE — 25010000002 FENTANYL CITRATE (PF) 50 MCG/ML SOLUTION: Performed by: EMERGENCY MEDICINE

## 2023-07-29 PROCEDURE — 25010000002 ONDANSETRON PER 1 MG: Performed by: NURSE PRACTITIONER

## 2023-07-29 PROCEDURE — 25510000001 IOPAMIDOL PER 1 ML: Performed by: EMERGENCY MEDICINE

## 2023-07-29 PROCEDURE — 36415 COLL VENOUS BLD VENIPUNCTURE: CPT | Performed by: NURSE PRACTITIONER

## 2023-07-29 PROCEDURE — 80048 BASIC METABOLIC PNL TOTAL CA: CPT | Performed by: NURSE PRACTITIONER

## 2023-07-29 PROCEDURE — 84484 ASSAY OF TROPONIN QUANT: CPT | Performed by: INTERNAL MEDICINE

## 2023-07-29 PROCEDURE — 80061 LIPID PANEL: CPT | Performed by: NURSE PRACTITIONER

## 2023-07-29 PROCEDURE — C1887 CATHETER, GUIDING: HCPCS | Performed by: EMERGENCY MEDICINE

## 2023-07-29 DEVICE — STNT CORNRY RESOLUTE ONYX RX 2X12MM: Type: IMPLANTABLE DEVICE | Site: CORONARY | Status: FUNCTIONAL

## 2023-07-29 DEVICE — STNT CORNRY RESOLUTE ONYX RX 2X22MM: Type: IMPLANTABLE DEVICE | Site: CORONARY | Status: FUNCTIONAL

## 2023-07-29 DEVICE — STNT CORNRY RESOLUTE ONYX RX 2X26MM: Type: IMPLANTABLE DEVICE | Site: CORONARY | Status: FUNCTIONAL

## 2023-07-29 RX ORDER — NITROGLYCERIN 0.4 MG/1
0.4 TABLET SUBLINGUAL
Status: DISCONTINUED | OUTPATIENT
Start: 2023-07-29 | End: 2023-07-30 | Stop reason: HOSPADM

## 2023-07-29 RX ORDER — ASPIRIN 81 MG/1
81 TABLET, CHEWABLE ORAL DAILY
Status: DISCONTINUED | OUTPATIENT
Start: 2023-07-30 | End: 2023-07-30 | Stop reason: HOSPADM

## 2023-07-29 RX ORDER — ATORVASTATIN CALCIUM 40 MG/1
40 TABLET, FILM COATED ORAL NIGHTLY
Status: DISCONTINUED | OUTPATIENT
Start: 2023-07-29 | End: 2023-07-30 | Stop reason: HOSPADM

## 2023-07-29 RX ORDER — SODIUM CHLORIDE 9 MG/ML
40 INJECTION, SOLUTION INTRAVENOUS AS NEEDED
Status: DISCONTINUED | OUTPATIENT
Start: 2023-07-29 | End: 2023-07-30 | Stop reason: HOSPADM

## 2023-07-29 RX ORDER — CLOPIDOGREL BISULFATE 75 MG/1
75 TABLET ORAL DAILY
Status: DISCONTINUED | OUTPATIENT
Start: 2023-07-30 | End: 2023-07-30 | Stop reason: HOSPADM

## 2023-07-29 RX ORDER — SODIUM CHLORIDE 0.9 % (FLUSH) 0.9 %
10 SYRINGE (ML) INJECTION EVERY 12 HOURS SCHEDULED
Status: DISCONTINUED | OUTPATIENT
Start: 2023-07-29 | End: 2023-07-30 | Stop reason: HOSPADM

## 2023-07-29 RX ORDER — LIDOCAINE HYDROCHLORIDE 20 MG/ML
INJECTION, SOLUTION INFILTRATION; PERINEURAL
Status: DISCONTINUED | OUTPATIENT
Start: 2023-07-29 | End: 2023-07-29 | Stop reason: HOSPADM

## 2023-07-29 RX ORDER — SODIUM CHLORIDE 0.9 % (FLUSH) 0.9 %
10 SYRINGE (ML) INJECTION AS NEEDED
Status: DISCONTINUED | OUTPATIENT
Start: 2023-07-29 | End: 2023-07-30 | Stop reason: HOSPADM

## 2023-07-29 RX ORDER — CLOPIDOGREL BISULFATE 75 MG/1
TABLET ORAL
Status: DISCONTINUED | OUTPATIENT
Start: 2023-07-29 | End: 2023-07-29 | Stop reason: HOSPADM

## 2023-07-29 RX ORDER — SODIUM CHLORIDE 9 MG/ML
100 INJECTION, SOLUTION INTRAVENOUS CONTINUOUS
Status: DISCONTINUED | OUTPATIENT
Start: 2023-07-29 | End: 2023-07-30

## 2023-07-29 RX ORDER — ONDANSETRON 2 MG/ML
4 INJECTION INTRAMUSCULAR; INTRAVENOUS EVERY 6 HOURS PRN
Status: DISCONTINUED | OUTPATIENT
Start: 2023-07-29 | End: 2023-07-29 | Stop reason: SDUPTHER

## 2023-07-29 RX ORDER — ONDANSETRON 2 MG/ML
4 INJECTION INTRAMUSCULAR; INTRAVENOUS EVERY 6 HOURS PRN
Status: DISCONTINUED | OUTPATIENT
Start: 2023-07-29 | End: 2023-07-30 | Stop reason: HOSPADM

## 2023-07-29 RX ORDER — ACETAMINOPHEN 325 MG/1
650 TABLET ORAL EVERY 4 HOURS PRN
Status: DISCONTINUED | OUTPATIENT
Start: 2023-07-29 | End: 2023-07-30 | Stop reason: HOSPADM

## 2023-07-29 RX ORDER — MIDAZOLAM HYDROCHLORIDE 1 MG/ML
INJECTION INTRAMUSCULAR; INTRAVENOUS
Status: DISCONTINUED | OUTPATIENT
Start: 2023-07-29 | End: 2023-07-29 | Stop reason: HOSPADM

## 2023-07-29 RX ORDER — FENTANYL CITRATE 50 UG/ML
INJECTION, SOLUTION INTRAMUSCULAR; INTRAVENOUS
Status: DISCONTINUED | OUTPATIENT
Start: 2023-07-29 | End: 2023-07-29 | Stop reason: HOSPADM

## 2023-07-29 RX ORDER — HEPARIN SODIUM 200 [USP'U]/100ML
INJECTION, SOLUTION INTRAVENOUS
Status: DISCONTINUED | OUTPATIENT
Start: 2023-07-29 | End: 2023-07-29 | Stop reason: HOSPADM

## 2023-07-29 RX ORDER — NITROGLYCERIN 0.4 MG/1
0.4 TABLET SUBLINGUAL
Status: DISCONTINUED | OUTPATIENT
Start: 2023-07-29 | End: 2023-07-29 | Stop reason: SDUPTHER

## 2023-07-29 RX ORDER — DIPHENHYDRAMINE HYDROCHLORIDE 50 MG/ML
INJECTION INTRAMUSCULAR; INTRAVENOUS
Status: DISCONTINUED | OUTPATIENT
Start: 2023-07-29 | End: 2023-07-29 | Stop reason: HOSPADM

## 2023-07-29 RX ORDER — ONDANSETRON 4 MG/1
4 TABLET, FILM COATED ORAL EVERY 6 HOURS PRN
Status: DISCONTINUED | OUTPATIENT
Start: 2023-07-29 | End: 2023-07-30 | Stop reason: HOSPADM

## 2023-07-29 RX ORDER — ASPIRIN 325 MG
TABLET ORAL
Status: DISCONTINUED | OUTPATIENT
Start: 2023-07-29 | End: 2023-07-29 | Stop reason: HOSPADM

## 2023-07-29 RX ADMIN — CARVEDILOL 6.25 MG: 6.25 TABLET, FILM COATED ORAL at 08:55

## 2023-07-29 RX ADMIN — ONDANSETRON 4 MG: 2 INJECTION INTRAMUSCULAR; INTRAVENOUS at 07:12

## 2023-07-29 RX ADMIN — SODIUM CHLORIDE 100 ML/HR: 9 INJECTION, SOLUTION INTRAVENOUS at 13:01

## 2023-07-29 RX ADMIN — ALBUTEROL SULFATE 1.25 MG: 2.5 SOLUTION RESPIRATORY (INHALATION) at 14:28

## 2023-07-29 RX ADMIN — Medication 10 ML: at 20:45

## 2023-07-29 RX ADMIN — IPRATROPIUM BROMIDE 0.5 MG: 0.5 SOLUTION RESPIRATORY (INHALATION) at 14:28

## 2023-07-29 RX ADMIN — TOPIRAMATE 50 MG: 25 TABLET, FILM COATED ORAL at 20:45

## 2023-07-29 RX ADMIN — CARVEDILOL 6.25 MG: 6.25 TABLET, FILM COATED ORAL at 18:49

## 2023-07-29 RX ADMIN — PANTOPRAZOLE SODIUM 40 MG: 40 TABLET, DELAYED RELEASE ORAL at 08:54

## 2023-07-29 RX ADMIN — ACETAMINOPHEN 650 MG: 325 TABLET ORAL at 08:54

## 2023-07-29 RX ADMIN — GABAPENTIN 300 MG: 300 CAPSULE ORAL at 20:45

## 2023-07-29 RX ADMIN — GABAPENTIN 300 MG: 300 CAPSULE ORAL at 08:55

## 2023-07-29 RX ADMIN — HYDROCODONE BITARTRATE AND ACETAMINOPHEN 1 TABLET: 10; 325 TABLET ORAL at 14:52

## 2023-07-29 RX ADMIN — Medication 10 ML: at 08:55

## 2023-07-29 RX ADMIN — PANTOPRAZOLE SODIUM 40 MG: 40 TABLET, DELAYED RELEASE ORAL at 20:45

## 2023-07-29 RX ADMIN — FLUOXETINE HYDROCHLORIDE 40 MG: 20 CAPSULE ORAL at 08:55

## 2023-07-29 RX ADMIN — ENOXAPARIN SODIUM 70 MG: 100 INJECTION SUBCUTANEOUS at 08:54

## 2023-07-29 RX ADMIN — CHLORHEXIDINE GLUCONATE 1 APPLICATION: 500 CLOTH TOPICAL at 00:04

## 2023-07-29 RX ADMIN — PREDNISONE 10 MG: 10 TABLET ORAL at 08:55

## 2023-07-29 RX ADMIN — HYDROCODONE BITARTRATE AND ACETAMINOPHEN 1 TABLET: 10; 325 TABLET ORAL at 08:18

## 2023-07-29 RX ADMIN — CHLORHEXIDINE GLUCONATE 1 APPLICATION: 500 CLOTH TOPICAL at 06:16

## 2023-07-29 RX ADMIN — ZOLPIDEM TARTRATE 10 MG: 5 TABLET ORAL at 20:45

## 2023-07-29 RX ADMIN — HYDROCODONE BITARTRATE AND ACETAMINOPHEN 1 TABLET: 10; 325 TABLET ORAL at 20:52

## 2023-07-29 RX ADMIN — GABAPENTIN 300 MG: 300 CAPSULE ORAL at 15:09

## 2023-07-29 RX ADMIN — TOPIRAMATE 50 MG: 25 TABLET, FILM COATED ORAL at 08:54

## 2023-07-29 RX ADMIN — ATORVASTATIN CALCIUM 40 MG: 40 TABLET ORAL at 20:45

## 2023-07-29 NOTE — PROGRESS NOTES
Troponins are climbing. Patient continues to complain of chest pain. Discussed LHC and she is agreeable. Discussed case with Dr. Patrick, interventional cardiology, and will proceed with [facility].

## 2023-07-29 NOTE — PROGRESS NOTES
[facility] -  Progress Note     LOS: 1 day   Patient Care Team:  Provider, No Known as PCP - General    Chief Complaint: Patient was seen pre and postprocedure.  This evening she is awake alert, sitting up in bed essentially chest pain-free.    Subjective     Interval History:     Review of Systems:  ROS    Vital Sign Min/Max for last 24 hours  Temp  Min: 97 øF (36.1 øC)  Max: 97.9 øF (36.6 øC)   BP  Min: 91/59  Max: 154/96   Pulse  Min: 63  Max: 94   Resp  Min: 13  Max: 25   SpO2  Min: 90 %  Max: 99 %   No data recorded   Weight  Min: 69.9 kg (154 lb)  Max: 70 kg (154 lb 5.2 oz)         07/29/23  0812   Weight: 69.9 kg (154 lb)       Physical Exam:   Physical Exam    She is in no distress.  She is awake alert and oriented.  HEENT: No icterus.  Neck: No jugular venous distention  Lungs clear to auscultation  Heart regular without audible murmur or gallop sound.  Extremities no problems at the cath site.    Medication Review: yes  Current Facility-Administered Medications   Medication Dose Route Frequency Provider Last Rate Last Admin    acetaminophen (TYLENOL) 160 MG/5ML solution 650 mg  650 mg Oral Q4H PRN Osman Patrick DO        Or    acetaminophen (TYLENOL) suppository 650 mg  650 mg Rectal Q4H PRN Osman Patrick DO        acetaminophen (TYLENOL) tablet 650 mg  650 mg Oral Q4H PRN Osman Patrick DO        albuterol (PROVENTIL) nebulizer solution 0.5% 2.5 mg/0.5mL  1.25 mg Nebulization 4x Daily - RT Osman Patrick DO   1.25 mg at 07/29/23 1428    And    ipratropium (ATROVENT) nebulizer solution 0.5 mg  0.5 mg Nebulization 4x Daily - RT Osman Patrick DO   0.5 mg at 07/29/23 1428    [START ON 7/30/2023] aspirin chewable tablet 81 mg  81 mg Oral Daily Osman Patrick DO        aspirin EC tablet 81 mg  81 mg Oral Daily Osman Patrick DO        atorvastatin (LIPITOR) tablet 40 mg  40 mg Oral Nightly Celina  Osman Walden DO        sennosides-docusate (PERICOLACE) 8.6-50 MG per tablet 1 tablet  1 tablet Oral Nightly PRN Osman Patrick DO        And    polyethylene glycol (MIRALAX) packet 17 g  17 g Oral Daily PRN Osman Patrick DO        And    bisacodyl (DULCOLAX) EC tablet 5 mg  5 mg Oral Daily PRN Osman Patrick DO        And    bisacodyl (DULCOLAX) suppository 10 mg  10 mg Rectal Daily PRN Osman Patrick DO        carvedilol (COREG) tablet 6.25 mg  6.25 mg Oral BID With Meals Osman Patrick DO   6.25 mg at 07/29/23 0855    Chlorhexidine Gluconate Cloth 2 % pads 1 application   1 application  Topical Q24H Osman Patrick DO   1 application  at 07/29/23 0616    [START ON 7/30/2023] clopidogrel (PLAVIX) tablet 75 mg  75 mg Oral Daily Osman Patrick DO        FLUoxetine (PROzac) capsule 40 mg  40 mg Oral Daily Osman Patrick DO   40 mg at 07/29/23 0855    gabapentin (NEURONTIN) capsule 300 mg  300 mg Oral TID Osman Patrick DO   300 mg at 07/29/23 1509    HYDROcodone-acetaminophen (NORCO)  MG per tablet 1 tablet  1 tablet Oral Q6H PRN Osman Patrick DO   1 tablet at 07/29/23 1452    mupirocin (BACTROBAN) 2 % nasal ointment 1 application   1 application  Each Nare BID Osman Patrick DO        nicotine (NICODERM CQ) 14 MG/24HR patch 1 patch  1 patch Transdermal Q24H Osman Patrick DO        nitroglycerin (NITROSTAT) SL tablet 0.4 mg  0.4 mg Sublingual Q5 Min PRN Osman Patrick DO        nitroglycerin (TRIDIL) 200 mcg/ml infusion  5-200 mcg/min Intravenous Titrated Osman Patrick DO 12 mL/hr at 07/29/23 0705 40 mcg/min at 07/29/23 0705    ondansetron (ZOFRAN) injection 4 mg  4 mg Intravenous Q6H PRN Dianna Daniel APRN        ondansetron (ZOFRAN) tablet 4 mg  4 mg Oral Q6H PRN Osman Patrick DO        pantoprazole (PROTONIX) EC tablet  40 mg  40 mg Oral BID Osman Patrick DO   40 mg at 07/29/23 0854    predniSONE (DELTASONE) tablet 10 mg  10 mg Oral Daily With Breakfast Osman Patrick, DO   10 mg at 07/29/23 0855    sodium chloride 0.9 % flush 10 mL  10 mL Intravenous PRN Osman Patrick, DO        sodium chloride 0.9 % flush 10 mL  10 mL Intravenous Q12H Fredy, Dianna L, APRN        sodium chloride 0.9 % flush 10 mL  10 mL Intravenous PRN Fredy, Dianna L, APRN        sodium chloride 0.9 % infusion 40 mL  40 mL Intravenous PRN Fredy, Dianna L, APRN        sodium chloride 0.9 % infusion  100 mL/hr Intravenous Continuous Osman Patrick DO   Stopped at 07/29/23 1520    spironolactone (ALDACTONE) tablet 25 mg  25 mg Oral Daily Osman Patrick DO   25 mg at 07/28/23 2331    topiramate (TOPAMAX) tablet 50 mg  50 mg Oral BID Osman Patrick DO   50 mg at 07/29/23 0854    zolpidem (AMBIEN) tablet 10 mg  10 mg Oral Nightly PRN Osman Patrick, DO   10 mg at 07/28/23 2331         Results Review:   I have reviewed all laboratory data, with pertinent findings: Recent troponin 825  I have reviewed the chest x-ray report/personally visualized chest x-ray, with findings yesterday's chest x-ray no acute disease.  I have reviewed telemetry, which shows sinus rhythm  I have visualized all the electrocardiograms performed, with my interpretations to follow: No acute changes    Assessment & Plan     Problem(s):    NSTEMI (non-ST elevated myocardial infarction)    COPD (chronic obstructive pulmonary disease)    Tobacco dependence    Chronic pain syndrome    Chest pain due to myocardial ischemia    1.  Non-STEMI.  Status post percutaneous intervention per Dr. Patrick.  Appreciate his efforts.  She is essentially pain-free now.  Continue DAPT and carvedilol as beta-blocker.    2.  Elevated BNP and CHF.  This was probably combined systolic and diastolic CHF.  Will add guideline directed therapy  as tolerated.  Start Entresto and SGLT2 I inhibitor in a.m.    Patient may be able to be discharged tomorrow.  She desires to go back to work in 1 week.  We will discuss this further.    Jagdish Willett MD  07/29/23  18:43 CDT

## 2023-07-29 NOTE — NURSING NOTE
Pt was scheduled for EKG at 0923 (q6 EKG per orders). At around 0910, Ultrasound came to do ECHO on patient and At around 0920 Cath lab crew came to take patient down to cath lab. So 0923 EKG was unable to be performed due to procedures.

## 2023-07-29 NOTE — H&P
"    [facility] Medicine Services  HISTORY AND PHYSICAL    Date of Admission: 7/28/2023  Primary Care Physician: Provider, No Known    Subjective   Primary Historian: Patient    Chief Complaint: Unrelieved chest pain    History of Present Illness  Alannah Barr 57-year-old female with a past medical history of tobacco dependence/COPD, chronic pain syndrome.  It is documented she has heart failure however she takes home medications nor is she aware of diagnosis..  Patient presented to outlying facility, [facility] emergency department with complaints of chest pain occurring over the past few days.  It worsened last evening.  She describes it as severe left upper chest pain that radiated into the both sides of her neck and up into her head.  She states nitroglycerin has improved somewhat but it is still ongoing currently scored 5 on a scale of 1-10.  Patient reports associated shortness of breathing nausea.  Those symptoms have resolved however despite nitroglycerin drip she continues to have chest pain.  Pain is worse with palpation.  She describes it as tender left upper shoulder region.  Currently vital signs are stable.  Troponins continue to rise.  Cardiology notified by ER provider, hospitalist asked to admit.  [facility] 7/15, [facility].  She is currently taking her third round of Medrol Dosepak.  She denies shortness of breathing.  She is admitted for further evaluation treatment.    Review of Systems   Otherwise complete ROS reviewed and negative except as mentioned in the HPI.    Past Medical History:   Past Medical History:   Diagnosis Date    CHF (congestive heart failure)     Chronic pain syndrome     COPD (chronic obstructive pulmonary disease)     Tobacco dependence      Past Surgical History:  Past Surgical History:   Procedure Laterality Date    CHOLECYSTECTOMY      HYSTERECTOMY      KNEE ARTHROSCOPY W/ " MEDIAL COLLATERAL LIGAMENT (MCL) REPAIR Right     x 2    TONSILLECTOMY       Social History:  reports that she has been smoking cigarettes. She does not have any smokeless tobacco history on file.    Family History: family history includes COPD in her mother; Diabetes in her father; Heart failure in her father.       Allergies:  Allergies   Allergen Reactions    Levaquin [Levofloxacin] Shortness Of Breath    Morphine Shortness Of Breath    Codeine Unknown - High Severity    Doxycycline Unknown - High Severity    Metronidazole Unknown - High Severity    Naproxen Unknown - High Severity    Propoxyphene Unknown - High Severity    Tetanus Toxoids Unknown - High Severity    Tetracyclines & Related Unknown - High Severity       Medications:  No current facility-administered medications on file prior to encounter.     Current Outpatient Medications on File Prior to Encounter   Medication Sig Dispense Refill    zolpidem (Ambien) 10 MG tablet Take 1 tablet by mouth At Night As Needed for Sleep.      FLUoxetine (PROzac) 20 MG capsule Take 2 capsules by mouth Daily.      furosemide (LASIX) 40 MG tablet Take 1 tablet by mouth Daily As Needed.      gabapentin (NEURONTIN) 300 MG capsule Take 1 capsule by mouth 3 (Three) Times a Day.      HYDROcodone-acetaminophen (NORCO)  MG per tablet Take 1 tablet by mouth Every 6 (Six) Hours As Needed for Moderate Pain.      ibuprofen (ADVIL,MOTRIN) 800 MG tablet Take 1 tablet by mouth Every 6 (Six) Hours As Needed for Mild Pain.      methylPREDNISolone (MEDROL) 4 MG dose pack Take  by mouth 2 (Two) Times a Day. Take as directed on package instructions.      ondansetron (ZOFRAN) 4 MG tablet Take 1 tablet by mouth Every 8 (Eight) Hours As Needed for Nausea or Vomiting.      pantoprazole (PROTONIX) 40 MG EC tablet Take 1 tablet by mouth 2 (Two) Times a Day.      topiramate (TOPAMAX) 50 MG tablet Take 1 tablet by mouth 2 (Two) Times a Day.        I have utilized all available immediate  "resources to obtain, update, or review the patient's current medications (including all prescriptions, over-the-counter products, herbals, cannabis/cannabidiol products, and vitamin/mineral/dietary (nutritional) supplements).    Objective     Vital Signs: /53   Pulse 74   Temp 98.3 øF (36.8 øC)   Resp 15   Ht 152.4 cm (60\")   Wt 71.2 kg (157 lb)   SpO2 95%   BMI 30.66 kg/mý   Physical Exam  Vitals reviewed.   Constitutional:       Appearance: She is ill-appearing.   HENT:      Head: Normocephalic and atraumatic.      Mouth/Throat:      Mouth: Mucous membranes are moist.      Pharynx: Oropharynx is clear.      Comments: edentulous  Eyes:      Extraocular Movements: Extraocular movements intact.      Conjunctiva/sclera: Conjunctivae normal.   Cardiovascular:      Rate and Rhythm: Normal rate and regular rhythm.   Pulmonary:      Effort: Pulmonary effort is normal.      Breath sounds: Wheezing (Right lower lobe posteriorly) present.   Abdominal:      Palpations: Abdomen is soft.      Comments: Protuberant   Musculoskeletal:         General: Normal range of motion.      Cervical back: Normal range of motion and neck supple.      Right lower leg: No edema.      Left lower leg: No edema.   Skin:     General: Skin is warm and dry.   Neurological:      General: No focal deficit present.      Mental Status: She is alert and oriented to person, place, and time.   Psychiatric:         Mood and Affect: Mood normal.         Behavior: Behavior normal.        Results Reviewed:  Lab Results (last 24 hours)       Procedure Component Value Units Date/Time    High Sensitivity Troponin T 2Hr [[id number]]  (Abnormal) Collected: 07/28/23 1959    Specimen: Blood Updated: 07/28/23 2029     HS Troponin T 683 ng/L      Troponin T Delta 120 ng/L     Manual Differential [[id number]]  (Abnormal) Collected: 07/28/23 1817    Specimen: Blood Updated: 07/28/23 1856     Neutrophil % 52.5 %      Lymphocyte % 35.6 %      Monocyte % 6.9 %  "     Eosinophil % 2.0 %      Atypical Lymphocyte % 3.0 %      Neutrophils Absolute 6.21 10*3/mm3      Lymphocytes Absolute 4.56 10*3/mm3      Monocytes Absolute 0.82 10*3/mm3      Eosinophils Absolute 0.24 10*3/mm3      Polychromasia Mod/2+     WBC Morphology Normal     Platelet Morphology Normal    CBC Auto Differential [[id number]]  (Abnormal) Collected: 07/28/23 1817    Specimen: Blood Updated: 07/28/23 1856     WBC 11.82 10*3/mm3      RBC 4.15 10*6/mm3      Hemoglobin 12.2 g/dL      Hematocrit 38.9 %      MCV 93.7 fL      MCH 29.4 pg      MCHC 31.4 g/dL      RDW 14.4 %      RDW-SD 49.0 fl      MPV 10.0 fL      Platelets 280 10*3/mm3     Basic Metabolic Panel [[id number]]  (Normal) Collected: 07/28/23 1817    Specimen: Blood Updated: 07/28/23 1849     Glucose 84 mg/dL      BUN 8 mg/dL      Creatinine 0.61 mg/dL      Sodium 139 mmol/L      Potassium 3.5 mmol/L      Chloride 102 mmol/L      CO2 22.0 mmol/L      Calcium 9.0 mg/dL      BUN/Creatinine Ratio 13.1     Anion Gap 15.0 mmol/L      eGFR 105.7 mL/min/1.73     Magnesium [[id number]]  (Normal) Collected: 07/28/23 1817    Specimen: Blood Updated: 07/28/23 1849     Magnesium 2.0 mg/dL     CK [[id number]]  (Abnormal) Collected: 07/28/23 1817    Specimen: Blood Updated: 07/28/23 1849     Creatine Kinase 705 U/L     High Sensitivity Troponin T [[id number]]  (Abnormal) Collected: 07/28/23 1817    Specimen: Blood Updated: 07/28/23 1849     HS Troponin T 563 ng/L     BNP [[id number]]  (Abnormal) Collected: 07/28/23 1817    Specimen: Blood Updated: 07/28/23 1848     proBNP 2,373.0 pg/mL     Protime-INR [[id number]]  (Abnormal) Collected: 07/28/23 1817    Specimen: Blood Updated: 07/28/23 1841     Protime 11.7 Seconds      INR 0.85    aPTT [[id number]]  (Normal) Collected: 07/28/23 1817    Specimen: Blood Updated: 07/28/23 1841     PTT 24.9 seconds     D-dimer, Quantitative [[id number]]  (Abnormal) Collected: 07/28/23 1817    Specimen: Blood Updated: 07/28/23 1841     D-Dimer,  Quantitative 1.33 MCGFEU/mL           Imaging Results (Last 24 Hours)       Procedure Component Value Units Date/Time    CT Angiogram Chest [[id number]] Collected: 07/28/23 1911     Updated: 07/28/23 1921    Narrative:      Exam: CT angiogram of the of the chest with intravenous contrast.     CLINICAL HISTORY:  Chest pain radiating into back.     DOSE:  218 mGycm. All CT scans are performed using dose optimization  techniques as appropriate to the performed exam and including at least  one of the following: Automated exposure control, adjustment of the mA  and/or kV according to size, and the use of the iterative reconstruction  technique.     TECHNIQUE: Contiguous axial images were obtained through the thorax  following intravenous contrast administration with reformatted images  obtained in the sagittal and coronal projections from the original data  set. Three-dimensional reconstructions are also obtained.     COMPARISON:  None available     FINDINGS:     Pulmonary arteries:  There is normal enhancement of the pulmonary  arteries with no evidence of pulmonary thromboembolic disease.     Aorta:  The thoracic aorta and proximal great vessels are normal in  appearance. There is no evidence of aortic dissection or aneurysm.     LUNGS:  No acute consolidative pneumonia or suspicious nodularity.     Pleural spaces: Unremarkable. No evidence of pleural effusion or  pneumothorax.     HEART: No evidence of cardiac enlargement. No pericardial effusion or  right ventricular dysfunction is present. Moderate coronary  calcifications are present.     Bones: No acute osseous abnormalities are demonstrated.     CHEST WALL: There is a focus of fat necrosis within the medial right  breast. No additional chest wall abnormalities are present.     Lymph nodes: No enlarged mediastinal or axillary lymphadenopathy is  present.     Upper abdomen: Limited images of the upper abdomen are unremarkable.       Impression:      1. No evidence  of pulmonary thromboembolic disease. The thoracic aorta  is normal in caliber with no evidence of dissection or aneurysm. The  lungs are clear.  2. Coronary artery calcifications are present. No evidence of  cardiomegaly or pericardial effusion.  This report was finalized on 07/28/2023 19:18 by Dr. Devin Cornejo MD.    XR Chest 1 View [[id number]] Collected: 07/28/23 1843     Updated: 07/28/23 1847    Narrative:      EXAMINATION: Chest 1 view 07/28/2023     Comparison: None available     HISTORY:  Chest pain     One-view chest: Upright frontal projection of the chest is obtained. The  lungs are clear with no evidence of acute parenchymal  consolidation. No  pleural effusion or free air is observed. The  mediastinal contours are  within normal limits.                                                                                                                         Impression:      Impression: No evidence of acute cardiopulmonary disease.  This report was finalized on 07/28/2023 18:43 by Dr. Devin Cornejo MD.        .   Obtained at [facility] facility  (192), CK-MB 53.9 (3.6), troponin at bedtime 6226 (60.4), proBNP 2323  WBC 10.5, hemoglobin 12.5, hematocrit 38.2, platelet count 321  Glucose 192, BUN 10, creatinine 1.01, sodium 135, potassium 3.6, chloride 99, CO2 24, serum osmolality 274, AST 53, ALT 38    Assessment / Plan   Assessment:   Active Hospital Problems    Diagnosis     **NSTEMI (non-ST elevated myocardial infarction)     COPD (chronic obstructive pulmonary disease)     Tobacco dependence     Chronic pain syndrome     Chest pain due to myocardial ischemia        Treatment Plan  1.  The patient will be admitted to Dr. Serrano's service here at [facility].   2.  Serial troponins, EKG as needed  3.  Consult cardiology  4.  N.p.o. after midnight for possible cardiac catheterization in a.m.  5.  Lovenox 1 mg/kg subcu every 12 hours  6.  Continue nitroglycerin drip  7.  Home  medications reviewed and restarted as appropriate  8.  Labs in a.m.  9.  NicoDerm patch, looking cessation education at discharge  10.  Routine telemetry orders  11.  Prednisone 10 mg daily x5 days    Medical Decision Making  Number and Complexity of problems: 5  Differential Diagnosis: None    Conditions and Status        Condition is unchanged.     [facility] Data  External documents reviewed: [facility] emergency room documentation  Cardiac tracing (EKG, telemetry) interpretation: Reviewed  Radiology interpretation: Reviewed  Labs reviewed: Yes  Any tests that were considered but not ordered: No     Decision rules/scores evaluated (example [id number], Wells, etc): No     Discussed with: Patient, fred Mcgregor and Dr. Serrano     Care Planning  Shared decision making: Patient, fred Mcgregor and Dr. Serrano  Code status and discussions: Full    Disposition  Social Determinants of Health that impact treatment or disposition: None  Estimated length of stay is 2+ days.     I confirmed that the patient's advanced care plan is present, code status is documented, and a surrogate decision maker is listed in the patient's medical record.     The patient's surrogate decision maker is fred Mcgregor.     The patient was seen and examined by me on 7/28/2023 at p.m.    Electronically signed by YENNI Salinas, 07/28/23, 22:12 CDT.

## 2023-07-29 NOTE — OP NOTE
[facility]  Date of procedure: 7/29/2023     Procedures performed:     1.  Access to the right femoral artery via modified Seldinger technique and ultrasound guidance  2.  Left heart catheterization with retrograde crossing aortic valve to the left ventricle pressure recorded  3.  LV ventriculography  4.  Selective bilateral coronary angiography  5.  Conscious sedation with continuous hemodynamic monitoring for 1 hour and 15 minutes  6.  Successful PCI to the third obtuse marginal branch with an resolute Lacho 2 x 22 mm drug-eluting stent  7.  Successful PCI to the third obtuse marginal branch with a resolute Lacho 2 x 12 mm drug-eluting stent  8.  Successful PCI to the left circumflex with a resolute [place] 2 x 26 mm drug-eluting stent  9.  Successful PTCA to the third obtuse marginal branch with an emerge MR 2 x 12 mm balloon times multiple inflations  10.  Successful PTCA to the left circumflex with an emerge MR 2 x 12 mm balloon times multiple inflations  11.  Successful NC PTCA to the obtuse marginal branch with a Quantum apex 2 x 12 mm balloon times multiple inflations  12.  Successful NC PTCA to the left circumflex with a Quantum apex 2 x 12 mm balloon times multiple inflations  13.  Successful NC PTCA to the obtuse marginal branch with a Quantum apex 2.5 x 12 mm balloon times multiple inflations  14.  Patent hemostasis achieved in the right femoral artery access site using a 6 Albanian Angio-Seal closure device      Risk, Benefits, and Alternatives discussed with the patient and/or family.  Plan is for moderate sedation and the patient agrees to proceed with the procedure.  An immediate assessment was done prior to the administration of moderate sedation     Indication: Non-ST elevation myocardial infarction with ongoing chest pain despite nitroglycerin infusion, uptrending troponins  Premedication: Versed, fentanyl  Contrast: Isovue 259 cc  Radiation: Flouro time= 12.7 minutes. Air  "Kerma= 869 mGy  Catheters: 6Fr JL4, 6Fr JR4, 6Fr angled pigtail  Guiding catheter: XB 3.5  PCI Hardware: .014\" BMW wire, 0.014 inch whisper extra-support wire, resolute Lacho 2 x 26 mm drug-eluting stent, resolute Lacho 2 x 22 mm drug-eluting stent, resolute Upatoi 2 x 12 mm drug-eluting stent, emerge MR 2 x 12 mm balloon, Quantum apex 2 x 12 mm balloon, Quantum apex 2.5 x 12 mm balloon    Procedural details:    The patient was brought to the cath lab and prepped and draped in the usual fashion.  Access was obtained in the right femoral artery via modified Seldinger technique and ultrasound guidance.  A 6 Cambodian sheath was placed into the artery and flushed.  Next, a JL 4 catheter was inserted and used to engage the left main and selective left coronary angiography was performed in multiple views.  Next, JR4 catheter was inserted and used to engage the right and selective right coronary angiography was performed in multiple views.  We then inserted a pigtail catheter into the left ventricle pressure recorded LV ventriculography was performed.  This catheter was then withdrawn back cross aortic valve and again pressures were recorded.  Next, an XB 3.5 catheter was inserted and used to engage the left main.  A BMW wire was inserted and advanced into the distal left circumflex.  A whisper export wire was then inserted and advanced into the third obtuse marginal branch.  Next, a emerge MR 2 x 12 mm balloon was inserted into the obtuse marginal branch where it was inflated multiple times.  We then inserted a resolute Upatoi 2 x 22 mm drug-eluting stent into the third obtuse marginal branch where was deployed at 12 tere for 45 seconds.  We then inserted a resolute Lacho 2 x 12 mm drug-eluting stent into the third obtuse marginal branch in an overlap fashion where was deployed at 10 tere for 45 seconds.  Postintervention angiography was performed in multiple views.  Next, the BMW wire was pulled back into the proximal vessel and " advanced through the stent struts into the distal left circumflex.  The same emerge MR 2 x 12 mm balloon was inserted into the left circumflex where was inflated multiple times within the mid and distal vessel.  Next, we inserted a resolute Winter 2 x 26 mm drug-eluting stent into the left circumflex where was deployed at 12 tere for 45 seconds.  We then inserted a Quantum apex 2.5 x 12 mm balloon into the obtuse marginal branch where was inflated multiple times.  The same balloon was then inserted onto the BMW wire to the left circumflex where was inflated multiple times.  We then inserted a Quantum NC 2.5 x 12 mm balloon into the obtuse marginal branch where he was inflated multiple times at the ostium.  Postintervention angiography was performed in multiple views.  Everything was then withdrawn through the sheath and the sheath was flushed.  Patent hemostasis was achieved in the right femoral artery access site using a 6 Nepali Angio-Seal closure device.  Patient tolerated the procedure well without any complications.  She left the Cath Lab in stable condition.          I supervised the administration of conscious sedation by nursing staff throughout the case.  First dose was given at 0943 and the end of my face-to-face encounter was at 1059, accounting for a total of 1 hour and 15 minutes of supervision.  During the case, continuous pulse oximetry, heart rate, blood pressure, and patient status were monitored.     Findings:    Hemodynamics:    Please see dedicated hemodynamics report for pressures    Left ventriculography:  1. The contractility of the left ventricle is mildly reduced.  Estimated ejection fraction 45%.  2.  No significant gradient across the aortic valve on pullback    Selective coronary angiography:     Left Main: The left main is a large-caliber vessel that bifurcates into the LAD and left circumflex coronary arteries, no angiographic evidence of stenosis, ALEXY-3 flow    LAD: The LAD is a  large-caliber vessel with mild disease in the proximal segment, there is 70 to 80% stenosis in the midsegment, there appears to be a possible 100% occlusion in the very distal portion of the vessel.  There is ALEXY-3 flow prior \to this very distal segment with ALEXY I flow distally.  This vessel was not intervened upon today and will need to be addressed in the future.    Diagonals: There is 1 large diagonal branch with mild disease    Left circumflex: Left circumflex is a large-caliber vessel with mild disease in the proximal segment, the mid and distal segments are severely diseased with stenoses ranging from 50% up to 90%, ALEXY-3 flow.  Status post successful PTCA and PCI x1 stent we had continuation of ALEXY-3 flow and no residual stenosis remaining.    Obtuse marginals: First OM is small caliber, the second OM is moderate caliber with mild disease, the third OM is large caliber with 99% subtotal occlusion.  Status post successful PTCA and stent placement x2 we had return of ALEXY-3 flow and no residual stenosis remaining.    RCA: Large-caliber vessel with mild disease only, ALEXY-3 flow    PDA/RONNY: Codominant system, small caliber vessels    Estimated Blood Loss: Minimal    Specimens: None    Complications: None       Impression:  1.  Coronary artery disease as described above including significant lesions in the left circumflex, large third obtuse marginal branch, and mid LAD.  Patient underwent intervention to her left circumflex and obtuse marginal branches today.  The LAD was left for the future and will need to be staged PCI.  2.  History of congestive heart failure  3.  History of COPD  4.  nicotine abuse  5.  Suicidal ideations      Plan:   1.  Dual antiplatelet therapy with aspirin and Plavix for a minimum of 1 year without interruption.  Due to #5 above, concerned if patient will be compliant.  We will need to follow her closely and be aggressive with education.  2.  Initiate high intensity statin with  Lipitor 40  3.  ACE inhibitor and beta-blockers in the future if her vitals can tolerate.  Blood pressures were soft during the procedure due to nitroglycerin infusion  4.  Referral for cardiac rehab  5.  Close follow-up with [facility] cardiology, Dr. Willett, on discharge.  Depending on patient's symptoms, determine the best time for staged PCI to the mid LAD.          Osman Patrick, DO  Interventional cardiology  [facility]  July 29, 2023

## 2023-07-29 NOTE — PLAN OF CARE
Goal Outcome Evaluation:  Plan of Care Reviewed With: patient        Progress: no change  Outcome Evaluation: pt c/o pain, sharp stabbing c/p initially, radiating to back pain, prn norco given, IV nitro titrated. pt voiced suicial ideation, was very agitated and threatening. pt calmed down through the night, pain became a 2/10. pt slept through the morning. pt npop since 0000, MD Willett contacted this am, confirmed npo status. vss. pt resting comfortably

## 2023-07-29 NOTE — CONSULTS
Referring Provider: No Known Provider    Reason for Consultation: Chest pain with elevated troponin levels.    Chief Complaint   Patient presents with    Chest Pain       Subjective .     History of present illness:  Alannah Barr is a 57 y.o. yo female with history of COPD and congestive heart failure who presents today for cardiac evaluation.  For the past 2 weeks or thereabouts the patient has had shortness of breath chest pain, cough and wheezing and she was admitted to the hospital at [facility] with what appears to be a COPD exacerbation.  Before admission she had been treated with antibiotics steroids and inhaled bronchodilators but apparently her symptoms worsened and she was admitted to the hospital.  At some point about 2 days ago she developed some tightness or heaviness in the anterior chest which has waxed and waned but generally has been present most of the time.  Her cardiac enzymes were noted to be elevated.  She was then transferred from inpatient care at [facility] to the [facility] emergency room.  She is admitted to the hospitalist and we have been consulted for cardiac evaluation and treatment.  For reasons that I do not yet understand, I cannot find a discharge summary from [facility] so I really do not have all the details.    In addition to her cough, shortness of breath and chest discomfort she has had considerable nausea and vomiting.  She has orthopnea.  She has discomfort in her upper back in the infrascapular region.  She has chronic insomnia and takes Ambien 10 mg nightly in order to sleep.  Congestive heart failure is listed as a medical problem but I do not have details.    The patient is single.  Her fianc‚  about 2 years ago.  She is a smoker but has cut down on smoking recently.  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  She is accompanied in the emergency room by her daughter.        Chief  Complaint   Patient presents with    Chest Pain   .    History  Past Medical History:   Diagnosis Date    CHF (congestive heart failure)     COPD (chronic obstructive pulmonary disease)    ,   History reviewed. No pertinent surgical history.,   History reviewed. No pertinent family history.,   Social History     Tobacco Use    Smoking status: Every Day     Types: Cigarettes   ,     Medications  Current Facility-Administered Medications   Medication Dose Route Frequency Provider Last Rate Last Admin    acetaminophen (TYLENOL) tablet 650 mg  650 mg Oral Q4H PRN Donna Lockhart APRN        Or    acetaminophen (TYLENOL) 160 MG/5ML solution 650 mg  650 mg Oral Q4H PRN Donna Lockhart APRN        Or    acetaminophen (TYLENOL) suppository 650 mg  650 mg Rectal Q4H PRN Donna Lockhart APRN        albuterol (PROVENTIL) nebulizer solution 0.5% 2.5 mg/0.5mL  1.25 mg Nebulization 4x Daily - RT Donna Lockhart APRN        And    ipratropium (ATROVENT) nebulizer solution 0.5 mg  0.5 mg Nebulization 4x Daily - RT Donna Lockhart APRN        sennosides-docusate (PERICOLACE) 8.6-50 MG per tablet 1 tablet  1 tablet Oral Nightly PRN Donna Lockhart APRN        And    polyethylene glycol (MIRALAX) packet 17 g  17 g Oral Daily PRN Donna Lockhart APRN        And    bisacodyl (DULCOLAX) EC tablet 5 mg  5 mg Oral Daily PRN Donna Lockhart APRN        And    bisacodyl (DULCOLAX) suppository 10 mg  10 mg Rectal Daily PRN Donna Lockhart APRN        Enoxaparin Sodium (LOVENOX) syringe 70 mg  1 mg/kg Subcutaneous Q12H Donna Lockhart APRN        nicotine (NICODERM CQ) 14 MG/24HR patch 1 patch  1 patch Transdermal Q24H Donna Lockhart APRN        nitroglycerin (NITROSTAT) SL tablet 0.4 mg  0.4 mg Sublingual Q5 Min PRN Donna Lockhart APRN        nitroglycerin (TRIDIL) 200 mcg/ml infusion  5-200 mcg/min Intravenous Titrated Raz Ochoa MD 3 mL/hr at 07/28/23 1936 10 mcg/min at 07/28/23 1936    ondansetron  (ZOFRAN) tablet 4 mg  4 mg Oral Q6H PRN Donna Lockhart APRREGLA        Or    ondansetron (ZOFRAN) injection 4 mg  4 mg Intravenous Q6H PRN Donna Lockhart APRN        [START ON 7/29/2023] predniSONE (DELTASONE) tablet 10 mg  10 mg Oral Daily With Breakfast Donna Lockhart APRN        sodium chloride 0.9 % flush 10 mL  10 mL Intravenous PRN Raz Ochoa MD        sodium chloride 0.9 % flush 10 mL  10 mL Intravenous Q12H Donna Lockhart, YENNI        sodium chloride 0.9 % flush 10 mL  10 mL Intravenous PRN Donna Lockhart, YENNI        sodium chloride 0.9 % infusion 40 mL  40 mL Intravenous PRN Donna Lockhart APRN         Current Outpatient Medications   Medication Sig Dispense Refill    zolpidem (Ambien) 10 MG tablet Take 1 tablet by mouth At Night As Needed for Sleep.      FLUoxetine (PROzac) 20 MG capsule Take 2 capsules by mouth Daily.      furosemide (LASIX) 40 MG tablet Take 1 tablet by mouth Daily As Needed.      gabapentin (NEURONTIN) 300 MG capsule Take 1 capsule by mouth 3 (Three) Times a Day.      HYDROcodone-acetaminophen (NORCO)  MG per tablet Take 1 tablet by mouth Every 6 (Six) Hours As Needed for Moderate Pain.      ibuprofen (ADVIL,MOTRIN) 800 MG tablet Take 1 tablet by mouth Every 6 (Six) Hours As Needed for Mild Pain.      methylPREDNISolone (MEDROL) 4 MG dose pack Take  by mouth 2 (Two) Times a Day. Take as directed on package instructions.      ondansetron (ZOFRAN) 4 MG tablet Take 1 tablet by mouth Every 8 (Eight) Hours As Needed for Nausea or Vomiting.      pantoprazole (PROTONIX) 40 MG EC tablet Take 1 tablet by mouth 2 (Two) Times a Day.      topiramate (TOPAMAX) 50 MG tablet Take 1 tablet by mouth 2 (Two) Times a Day.         Allergies:  Levaquin [levofloxacin], Morphine, Codeine, Doxycycline, Metronidazole, Naproxen, Propoxyphene, Tetanus toxoids, and Tetracyclines & related    Review of Systems  CONSTITUTIONAL: No recent unwanted weight gain or loss  HEENT: She has had  "visual disturbances in the past and was felt to have optic neuritis.  RESPIRATORY: Productive cough with wheezing and shortness of air as in HPI.  CARDIOVASCULAR: Chest pain as in HPI.  GASTROINTESTINAL: Nausea and vomiting.  No melena or hematochezia or change in bowel habits.  GENITOURINARY: No recent dysuria  MUSCULOSKELETAL: Chronic arthritic musculoskeletal pain requiring NSAIDs  NEUROLOGICAL: Negative  PSYCHIATRIC: Depression.  She has a multitude of scabbed over scratches and linear scars over both forearms which she attributes to dog scratches and says that she forgets to scratch them when they itch.  SKIN: Forearm scratches as noted above.  ENDOCRINE: Negative  HEMATOLOGIC: No easy bruisability  LYMPHATIC: No lymphadenopathy  ALLERGIC AND IMMUNOLOGIC: Multiple medical allergies as listed.    Objective     Physical Exam:  Patient Vitals for the past 24 hrs:   BP Temp Pulse Resp SpO2 Height Weight   07/28/23 2133 -- -- -- -- 95 % -- --   07/28/23 2131 104/55 -- 72 -- -- -- --   07/28/23 2101 104/61 -- 72 -- -- -- --   07/28/23 2059 -- -- -- -- 97 % -- --   07/28/23 1953 101/74 -- 85 -- -- -- --   07/28/23 1951 -- -- -- -- 96 % -- --   07/28/23 1907 131/61 -- 94 -- -- -- --   07/28/23 1807 -- 98.3 øF (36.8 øC) -- -- -- -- --   07/28/23 1805 98/68 -- 87 15 97 % 152.4 cm (60\") 71.2 kg (157 lb)     General -a 57-year-old female who is awake and alert in no acute distress  Eyes -no scleral icterus.  Normocephalic.  Oropharynx -no exudates  Lymphatics -grossly negative  Respiratory -decreased breath sounds and bibasilar rales with a few scattered wheezes.  Cardiac -regular rhythm without audible murmur or gallop sound.  Abdomen -no masses or tenderness.  Extremities -trace pretibial edema.  Musculoskeletal -negative  Skin-multitude of open sores on both forearms  Neurological examination -no gross focal neurologic abnormalities.    Results Review:   I reviewed the patient's new clinical results.  Lab Results (last " 24 hours)       Procedure Component Value Units Date/Time    High Sensitivity Troponin T 2Hr [[id number]]  (Abnormal) Collected: 07/28/23 1959    Specimen: Blood Updated: 07/28/23 2029     HS Troponin T 683 ng/L      Troponin T Delta 120 ng/L     Narrative:      High Sensitive Troponin T Reference Range:  <10.0 ng/L- Negative Female for AMI  <15.0 ng/L- Negative Male for AMI  >=10 - Abnormal Female indicating possible myocardial injury.  >=15 - Abnormal Male indicating possible myocardial injury.   Clinicians would have to utilize clinical acumen, EKG, Troponin, and serial changes to determine if it is an Acute Myocardial Infarction or myocardial injury due to an underlying chronic condition.         Manual Differential [[id number]]  (Abnormal) Collected: 07/28/23 1817    Specimen: Blood Updated: 07/28/23 1856     Neutrophil % 52.5 %      Lymphocyte % 35.6 %      Monocyte % 6.9 %      Eosinophil % 2.0 %      Atypical Lymphocyte % 3.0 %      Neutrophils Absolute 6.21 10*3/mm3      Lymphocytes Absolute 4.56 10*3/mm3      Monocytes Absolute 0.82 10*3/mm3      Eosinophils Absolute 0.24 10*3/mm3      Polychromasia Mod/2+     WBC Morphology Normal     Platelet Morphology Normal    CBC & Differential [[id number]]  (Abnormal) Collected: 07/28/23 1817    Specimen: Blood Updated: 07/28/23 1856    Narrative:      The following orders were created for panel order CBC & Differential.  Procedure                               Abnormality         Status                     ---------                               -----------         ------                     CBC Auto Differential[[id number]]        Abnormal            Final result                 Please view results for these tests on the individual orders.    CBC Auto Differential [[id number]]  (Abnormal) Collected: 07/28/23 1817    Specimen: Blood Updated: 07/28/23 1856     WBC 11.82 10*3/mm3      RBC 4.15 10*6/mm3      Hemoglobin 12.2 g/dL      Hematocrit 38.9 %      MCV 93.7 fL       MCH 29.4 pg      MCHC 31.4 g/dL      RDW 14.4 %      RDW-SD 49.0 fl      MPV 10.0 fL      Platelets 280 10*3/mm3     Narrative:      The previously reported component NRBC is no longer being reported. Previous result was 0.0 /100 WBC (Reference Range: 0.0-0.2 /100 WBC) on 7/28/2023 at 1830 CDT.    Basic Metabolic Panel [[id number]]  (Normal) Collected: 07/28/23 1817    Specimen: Blood Updated: 07/28/23 1849     Glucose 84 mg/dL      BUN 8 mg/dL      Creatinine 0.61 mg/dL      Sodium 139 mmol/L      Potassium 3.5 mmol/L      Chloride 102 mmol/L      CO2 22.0 mmol/L      Calcium 9.0 mg/dL      BUN/Creatinine Ratio 13.1     Anion Gap 15.0 mmol/L      eGFR 105.7 mL/min/1.73     Narrative:      GFR Normal >60  Chronic Kidney Disease <60  Kidney Failure <15      Magnesium [[id number]]  (Normal) Collected: 07/28/23 1817    Specimen: Blood Updated: 07/28/23 1849     Magnesium 2.0 mg/dL     CK [[id number]]  (Abnormal) Collected: 07/28/23 1817    Specimen: Blood Updated: 07/28/23 1849     Creatine Kinase 705 U/L     High Sensitivity Troponin T [[id number]]  (Abnormal) Collected: 07/28/23 1817    Specimen: Blood Updated: 07/28/23 1849     HS Troponin T 563 ng/L     Narrative:      High Sensitive Troponin T Reference Range:  <10.0 ng/L- Negative Female for AMI  <15.0 ng/L- Negative Male for AMI  >=10 - Abnormal Female indicating possible myocardial injury.  >=15 - Abnormal Male indicating possible myocardial injury.   Clinicians would have to utilize clinical acumen, EKG, Troponin, and serial changes to determine if it is an Acute Myocardial Infarction or myocardial injury due to an underlying chronic condition.         BNP [[id number]]  (Abnormal) Collected: 07/28/23 1817    Specimen: Blood Updated: 07/28/23 1848     proBNP 2,373.0 pg/mL     Narrative:      Among patients with dyspnea, NT-proBNP is highly sensitive for the detection of acute congestive heart failure. In addition NT-proBNP of <300 pg/ml effectively rules out acute  "congestive heart failure with 99% negative predictive value.    Results may be falsely decreased if patient taking Biotin.      Protime-INR [[id number]]  (Abnormal) Collected: 07/28/23 1817    Specimen: Blood Updated: 07/28/23 1841     Protime 11.7 Seconds      INR 0.85    aPTT [[id number]]  (Normal) Collected: 07/28/23 1817    Specimen: Blood Updated: 07/28/23 1841     PTT 24.9 seconds     D-dimer, Quantitative [[id number]]  (Abnormal) Collected: 07/28/23 1817    Specimen: Blood Updated: 07/28/23 1841     D-Dimer, Quantitative 1.33 MCGFEU/mL     Narrative:      According to the assay 's published package insert, a normal (<0.50 MCGFEU/mL) D-dimer result in conjunction with a non-high clinical probability assessment, excludes deep vein thrombosis (DVT) and pulmonary embolism (PE) with high sensitivity.    D-dimer values increase with age and this can make VTE exclusion of an older population difficult. To address this, the [employer], based on best available evidence and recent guidelines, recommends that clinicians use age-adjusted D-dimer thresholds in patients greater than 50 years of age with: a) a low probability of PE who do not meet all Pulmonary Embolism Rule Out Criteria, or b) in those with intermediate probability of PE.   The formula for an age-adjusted D-dimer cut-off is \"age/100\".  For example, a 60 year old patient would have an age-adjusted cut-off of 0.60 MCGFEU/mL and an 80 year old 0.80 MCGFEU/mL.          Imaging Results (Last 24 Hours)       Procedure Component Value Units Date/Time    CT Angiogram Chest [[id number]] Collected: 07/28/23 1911     Updated: 07/28/23 1921    Narrative:      Exam: CT angiogram of the of the chest with intravenous contrast.     CLINICAL HISTORY:  Chest pain radiating into back.     DOSE:  218 mGycm. All CT scans are performed using dose optimization  techniques as appropriate to the performed exam and including at least  one of the " following: Automated exposure control, adjustment of the mA  and/or kV according to size, and the use of the iterative reconstruction  technique.     TECHNIQUE: Contiguous axial images were obtained through the thorax  following intravenous contrast administration with reformatted images  obtained in the sagittal and coronal projections from the original data  set. Three-dimensional reconstructions are also obtained.     COMPARISON:  None available     FINDINGS:     Pulmonary arteries:  There is normal enhancement of the pulmonary  arteries with no evidence of pulmonary thromboembolic disease.     Aorta:  The thoracic aorta and proximal great vessels are normal in  appearance. There is no evidence of aortic dissection or aneurysm.     LUNGS:  No acute consolidative pneumonia or suspicious nodularity.     Pleural spaces: Unremarkable. No evidence of pleural effusion or  pneumothorax.     HEART: No evidence of cardiac enlargement. No pericardial effusion or  right ventricular dysfunction is present. Moderate coronary  calcifications are present.     Bones: No acute osseous abnormalities are demonstrated.     CHEST WALL: There is a focus of fat necrosis within the medial right  breast. No additional chest wall abnormalities are present.     Lymph nodes: No enlarged mediastinal or axillary lymphadenopathy is  present.     Upper abdomen: Limited images of the upper abdomen are unremarkable.       Impression:      1. No evidence of pulmonary thromboembolic disease. The thoracic aorta  is normal in caliber with no evidence of dissection or aneurysm. The  lungs are clear.  2. Coronary artery calcifications are present. No evidence of  cardiomegaly or pericardial effusion.  This report was finalized on 07/28/2023 19:18 by Dr. Devin Cornejo MD.    XR Chest 1 View [[id number]] Collected: 07/28/23 1843     Updated: 07/28/23 1847    Narrative:      EXAMINATION: Chest 1 view 07/28/2023     Comparison: None available      HISTORY:  Chest pain     One-view chest: Upright frontal projection of the chest is obtained. The  lungs are clear with no evidence of acute parenchymal  consolidation. No  pleural effusion or free air is observed. The  mediastinal contours are  within normal limits.                                                                                                                         Impression:      Impression: No evidence of acute cardiopulmonary disease.  This report was finalized on 07/28/2023 18:43 by Dr. Devin Cornejo MD.          No results found for: ECHOEFEST      NSTEMI (non-ST elevated myocardial infarction)      ASSESSMENT/PLAN:    1.  Elevated troponin with chest pain.  Differential diagnosis is non-STEMI versus Takotsubo (stress-induced) cardiomyopathy.  Pulmonary embolus and aortic dissection appear less likely.  Again, I have no way of knowing what studies were done at the [facility].  If she has not had a CTA I would certainly consider this.  She certainly has risk factors for the development of coronary artery disease.  Her EKG does not show any overt acute changes.  There is a Q wave in lead III suggestive of previous inferior infarct.  Will obtain echocardiogram, serial EKGs and she will likely undergo cardiac catheterization in the near future.    2.  Elevated BNP level.  This is suggestive of congestive heart failure.  Her chest x-ray is clear however.  I feel that there is likely to be some volume overload.  Severe COPD with some pulmonary hypertension could also be a contributing factor to the elevated BNP.  Again, the echocardiogram which will be obtained in the morning will provide additional information regarding this.  I will start some guideline directed medicine as tolerated this evening.  We will start with an aldosterone antagonist since her potassium is low.  We will also add low-dose beta-blockers.  If she has stress-induced cardiomyopathy, this may explain  the elevated BNP.    3.  Tobacco use.  I have counseled her extensively about tobacco cessation and the harmful effects of smoking including but not limited to COPD, vascular disease including limb ischemia, stroke, myocardial infarction and various types of cancer including lung cancer and death.    4.  Nausea and vomiting.  Possibly related to myocardial infarction but other etiologies are not excluded.  Consider gallbladder disease gastritis etc.    5.  Depression.  This is beyond my scope of practice but she appears depressed and I wonder about the extensive scratch marks and linear scars she has all over both forearms.    Thank you for asking us to see this patient.      Jagdish Willett MD  07/28/23  21:51 CDT

## 2023-07-29 NOTE — PROGRESS NOTES
[facility] Medicine Services  INPATIENT PROGRESS NOTE    Patient Name: Alannah Barr  Date of Admission: 7/28/2023  Today's Date: 07/29/23  Length of Stay: 1  Primary Care Physician: Provider, No Known    Subjective   Chief Complaint: f/u chest pain    HPI   Patient seen in the room complaining her echo with daughter at bedside.  Nursing at bedside.  She continues to complain of generalized body aches and pains as well as chest pain. She was nauseous earlier but improving.       Review of Systems   All pertinent negatives and positives are as above. All other systems have been reviewed and are negative unless otherwise stated.     Objective    Temp:  [97 øF (36.1 øC)-98.3 øF (36.8 øC)] 97.9 øF (36.6 øC)  Heart Rate:  [66-94] 80  Resp:  [15-24] 23  BP: ()/(53-96) 127/82  Physical Exam  GEN: Awake, alert, interactive, in NAD  HEENT: PERRLA, EOMI, Anicteric, Trachea midline  Lungs: no overt wheezes or rales  Heart: RRR, +S1/s2, no rub  ABD: soft, nt/nd, +BS, no guarding/rebound  Extremities: atraumatic, no cyanosis  Skin: no petechiae  Neuro: AAOx3, no focal deficits  Psych: normal mood & affect        Results Review:  I have reviewed the labs, radiology results, and diagnostic studies.    Laboratory Data:   Results from last 7 days   Lab Units 07/29/23  0321 07/28/23  1817   WBC 10*3/mm3 10.79 11.82*   HEMOGLOBIN g/dL 11.9* 12.2   HEMATOCRIT % 38.9 38.9   PLATELETS 10*3/mm3 259 280        Results from last 7 days   Lab Units 07/29/23  0321 07/28/23  1817   SODIUM mmol/L 141 139   POTASSIUM mmol/L 4.2 3.5   CHLORIDE mmol/L 104 102   CO2 mmol/L 26.0 22.0   BUN mg/dL 12 8   CREATININE mg/dL 0.67 0.61   CALCIUM mg/dL 8.7 9.0   GLUCOSE mg/dL 109* 84       Culture Data:   No results found for: BLOODCX, URINECX, WOUNDCX, MRSACX, RESPCX, STOOLCX    Radiology Data:   Imaging Results (Last 24 Hours)       Procedure Component Value Units Date/Time    CT Angiogram Chest  [[id number]] Collected: 07/28/23 1911     Updated: 07/28/23 1921    Narrative:      Exam: CT angiogram of the of the chest with intravenous contrast.     CLINICAL HISTORY:  Chest pain radiating into back.     DOSE:  218 mGycm. All CT scans are performed using dose optimization  techniques as appropriate to the performed exam and including at least  one of the following: Automated exposure control, adjustment of the mA  and/or kV according to size, and the use of the iterative reconstruction  technique.     TECHNIQUE: Contiguous axial images were obtained through the thorax  following intravenous contrast administration with reformatted images  obtained in the sagittal and coronal projections from the original data  set. Three-dimensional reconstructions are also obtained.     COMPARISON:  None available     FINDINGS:     Pulmonary arteries:  There is normal enhancement of the pulmonary  arteries with no evidence of pulmonary thromboembolic disease.     Aorta:  The thoracic aorta and proximal great vessels are normal in  appearance. There is no evidence of aortic dissection or aneurysm.     LUNGS:  No acute consolidative pneumonia or suspicious nodularity.     Pleural spaces: Unremarkable. No evidence of pleural effusion or  pneumothorax.     HEART: No evidence of cardiac enlargement. No pericardial effusion or  right ventricular dysfunction is present. Moderate coronary  calcifications are present.     Bones: No acute osseous abnormalities are demonstrated.     CHEST WALL: There is a focus of fat necrosis within the medial right  breast. No additional chest wall abnormalities are present.     Lymph nodes: No enlarged mediastinal or axillary lymphadenopathy is  present.     Upper abdomen: Limited images of the upper abdomen are unremarkable.       Impression:      1. No evidence of pulmonary thromboembolic disease. The thoracic aorta  is normal in caliber with no evidence of dissection or aneurysm. The  lungs are  clear.  2. Coronary artery calcifications are present. No evidence of  cardiomegaly or pericardial effusion.  This report was finalized on 07/28/2023 19:18 by Dr. Devin Cornejo MD.    XR Chest 1 View [[id number]] Collected: 07/28/23 1843     Updated: 07/28/23 1847    Narrative:      EXAMINATION: Chest 1 view 07/28/2023     Comparison: None available     HISTORY:  Chest pain     One-view chest: Upright frontal projection of the chest is obtained. The  lungs are clear with no evidence of acute parenchymal  consolidation. No  pleural effusion or free air is observed. The  mediastinal contours are  within normal limits.                                                                                                                         Impression:      Impression: No evidence of acute cardiopulmonary disease.  This report was finalized on 07/28/2023 18:43 by Dr. Devin Cornejo MD.            I have reviewed the patient's current medications.     Assessment/Plan   Assessment  Active Hospital Problems    Diagnosis     **NSTEMI (non-ST elevated myocardial infarction)     COPD (chronic obstructive pulmonary disease)     Tobacco dependence     Chronic pain syndrome     Chest pain due to myocardial ischemia        Treatment Plan  #1 NSTEMI - pt presenting with chest pain and positive troponin which was climbing overnight. ? Slight st elevation/J point scooping in inferior leads but not more than a box. Seen by cardiology and going for cardiac cath. She has been on lovenox full dose overnight. Awaiting 2d echo results, test just completed prior to going down for cath    #2 copd -by history on steroids several times over recently. She was on room air overnight but put on 02 this AM given NSTEMI diagnosis. Was not wheezing on my exam. Supportive care. Nebs     #3 tobacco use - discussed cessation, nicoderm patch post cath    #4 chronic pain syndrome - meds re-ordered by admitted provider, will f/u on symptoms    #5 ?SI -  pt was put on suicide precautions overnight for apparently initially positive screen for occasional suicidal thoughts but then stating she was in so much pain she wanted to kill herself. Will discuss further with patient when back from cath as I do not want to delay her going down for cath procedure at this time given anginal symptoms. Pt can not leave AMA at this time until further discussed, 72 hour hold if needed and still may institute later regardless depending on conversation.     #6 elevated ck - ? Etiology. Pt with multiple vague pain complaints. No statin for now. Add on esr and crp and recheck ck in AM    Medical Decision Making  Number and Complexity of problems: 1-2 acute with risk for bodily harm or death, multiple chronic  Differential Diagnosis: as above    Conditions and Status       Patient is new to me, going down to cath lab     MDM Data  External documents reviewed: none  Cardiac tracing (EKG, telemetry) interpretation: ? Jscoping inferior, sinus  Radiology interpretation: reports reviewed  Labs reviewed: as above  Any tests that were considered but not ordered: none     Decision rules/scores evaluated (example [id number], Wells, etc): none     Discussed with: patient, nursing staff, cardiology     Care Planning  Shared decision making: Patient apprised of current labs, vitals, imaging and treatment plan.  They are agreeable with proceeding with plans as discussed.    Code status and discussions: full code    Disposition  Social Determinants of Health that impact treatment or disposition: none  I expect the patient to be discharged in 1-2 days pending work up and cath results. May need psych eval prior to d/c as well.     Electronically signed by Win Rajput DO, 07/29/23, 09:55 CDT.

## 2023-07-30 ENCOUNTER — READMISSION MANAGEMENT (OUTPATIENT)
Dept: CALL CENTER | Facility: HOSPITAL | Age: 58
End: 2023-07-30
Payer: COMMERCIAL

## 2023-07-30 VITALS
OXYGEN SATURATION: 97 % | TEMPERATURE: 98.1 F | HEIGHT: 60 IN | SYSTOLIC BLOOD PRESSURE: 127 MMHG | RESPIRATION RATE: 20 BRPM | HEART RATE: 75 BPM | BODY MASS INDEX: 30.43 KG/M2 | WEIGHT: 155 LBS | DIASTOLIC BLOOD PRESSURE: 79 MMHG

## 2023-07-30 LAB
ANION GAP SERPL CALCULATED.3IONS-SCNC: 11 MMOL/L (ref 5–15)
BUN SERPL-MCNC: 8 MG/DL (ref 6–20)
BUN/CREAT SERPL: 14 (ref 7–25)
CALCIUM SPEC-SCNC: 8.5 MG/DL (ref 8.6–10.5)
CHLORIDE SERPL-SCNC: 104 MMOL/L (ref 98–107)
CK SERPL-CCNC: 539 U/L (ref 20–180)
CO2 SERPL-SCNC: 22 MMOL/L (ref 22–29)
CREAT SERPL-MCNC: 0.57 MG/DL (ref 0.57–1)
DEPRECATED RDW RBC AUTO: 51.5 FL (ref 37–54)
EGFRCR SERPLBLD CKD-EPI 2021: 107.5 ML/MIN/1.73
ERYTHROCYTE [DISTWIDTH] IN BLOOD BY AUTOMATED COUNT: 14.2 % (ref 12.3–15.4)
GLUCOSE SERPL-MCNC: 130 MG/DL (ref 65–99)
HCT VFR BLD AUTO: 37.3 % (ref 34–46.6)
HGB BLD-MCNC: 11 G/DL (ref 12–15.9)
MCH RBC QN AUTO: 29 PG (ref 26.6–33)
MCHC RBC AUTO-ENTMCNC: 29.5 G/DL (ref 31.5–35.7)
MCV RBC AUTO: 98.4 FL (ref 79–97)
PLATELET # BLD AUTO: 221 10*3/MM3 (ref 140–450)
PMV BLD AUTO: 10.5 FL (ref 6–12)
POTASSIUM SERPL-SCNC: 4.3 MMOL/L (ref 3.5–5.2)
QT INTERVAL: 372 MS
QT INTERVAL: 394 MS
QT INTERVAL: 424 MS
QT INTERVAL: 440 MS
QTC INTERVAL: 426 MS
QTC INTERVAL: 443 MS
QTC INTERVAL: 450 MS
QTC INTERVAL: 461 MS
RBC # BLD AUTO: 3.79 10*6/MM3 (ref 3.77–5.28)
SODIUM SERPL-SCNC: 137 MMOL/L (ref 136–145)
WBC NRBC COR # BLD: 10.45 10*3/MM3 (ref 3.4–10.8)

## 2023-07-30 PROCEDURE — 80048 BASIC METABOLIC PNL TOTAL CA: CPT | Performed by: EMERGENCY MEDICINE

## 2023-07-30 PROCEDURE — 85027 COMPLETE CBC AUTOMATED: CPT | Performed by: EMERGENCY MEDICINE

## 2023-07-30 PROCEDURE — 63710000001 PREDNISONE PER 5 MG: Performed by: EMERGENCY MEDICINE

## 2023-07-30 PROCEDURE — 93010 ELECTROCARDIOGRAM REPORT: CPT | Performed by: INTERNAL MEDICINE

## 2023-07-30 PROCEDURE — 99232 SBSQ HOSP IP/OBS MODERATE 35: CPT | Performed by: NURSE PRACTITIONER

## 2023-07-30 PROCEDURE — 82550 ASSAY OF CK (CPK): CPT | Performed by: EMERGENCY MEDICINE

## 2023-07-30 PROCEDURE — 93005 ELECTROCARDIOGRAM TRACING: CPT | Performed by: INTERNAL MEDICINE

## 2023-07-30 RX ORDER — ATORVASTATIN CALCIUM 40 MG/1
40 TABLET, FILM COATED ORAL NIGHTLY
Qty: 30 TABLET | Refills: 0 | Status: SHIPPED | OUTPATIENT
Start: 2023-07-30 | End: 2023-08-29

## 2023-07-30 RX ORDER — ASPIRIN 81 MG/1
81 TABLET ORAL DAILY
Qty: 30 TABLET | Refills: 2 | Status: SHIPPED | OUTPATIENT
Start: 2023-07-30

## 2023-07-30 RX ORDER — CARVEDILOL 6.25 MG/1
6.25 TABLET ORAL 2 TIMES DAILY WITH MEALS
Qty: 60 TABLET | Refills: 0 | Status: SHIPPED | OUTPATIENT
Start: 2023-07-30 | End: 2023-07-30 | Stop reason: SDUPTHER

## 2023-07-30 RX ORDER — CLOPIDOGREL BISULFATE 75 MG/1
75 TABLET ORAL DAILY
Qty: 30 TABLET | Refills: 2 | Status: SHIPPED | OUTPATIENT
Start: 2023-07-31 | End: 2023-07-30 | Stop reason: SDUPTHER

## 2023-07-30 RX ORDER — SPIRONOLACTONE 25 MG/1
25 TABLET ORAL DAILY
Qty: 30 TABLET | Refills: 0 | Status: SHIPPED | OUTPATIENT
Start: 2023-07-31 | End: 2023-07-30 | Stop reason: SDUPTHER

## 2023-07-30 RX ORDER — ASPIRIN 81 MG/1
81 TABLET ORAL DAILY
Qty: 30 TABLET | Refills: 2 | Status: SHIPPED | OUTPATIENT
Start: 2023-07-30 | End: 2023-07-30 | Stop reason: SDUPTHER

## 2023-07-30 RX ORDER — CLOPIDOGREL BISULFATE 75 MG/1
75 TABLET ORAL DAILY
Qty: 30 TABLET | Refills: 2 | Status: SHIPPED | OUTPATIENT
Start: 2023-07-31

## 2023-07-30 RX ORDER — SPIRONOLACTONE 25 MG/1
25 TABLET ORAL DAILY
Qty: 30 TABLET | Refills: 0 | Status: SHIPPED | OUTPATIENT
Start: 2023-07-31

## 2023-07-30 RX ORDER — CARVEDILOL 6.25 MG/1
6.25 TABLET ORAL 2 TIMES DAILY WITH MEALS
Qty: 60 TABLET | Refills: 0 | Status: SHIPPED | OUTPATIENT
Start: 2023-07-30 | End: 2023-08-29

## 2023-07-30 RX ADMIN — CARVEDILOL 6.25 MG: 6.25 TABLET, FILM COATED ORAL at 08:57

## 2023-07-30 RX ADMIN — SPIRONOLACTONE 25 MG: 25 TABLET ORAL at 08:57

## 2023-07-30 RX ADMIN — Medication 1 APPLICATION: at 08:56

## 2023-07-30 RX ADMIN — HYDROCODONE BITARTRATE AND ACETAMINOPHEN 1 TABLET: 10; 325 TABLET ORAL at 09:51

## 2023-07-30 RX ADMIN — CLOPIDOGREL BISULFATE 75 MG: 75 TABLET, FILM COATED ORAL at 08:57

## 2023-07-30 RX ADMIN — HYDROCODONE BITARTRATE AND ACETAMINOPHEN 1 TABLET: 10; 325 TABLET ORAL at 03:50

## 2023-07-30 RX ADMIN — TOPIRAMATE 50 MG: 25 TABLET, FILM COATED ORAL at 08:56

## 2023-07-30 RX ADMIN — SACUBITRIL AND VALSARTAN 1 TABLET: 24; 26 TABLET, FILM COATED ORAL at 08:57

## 2023-07-30 RX ADMIN — PANTOPRAZOLE SODIUM 40 MG: 40 TABLET, DELAYED RELEASE ORAL at 08:57

## 2023-07-30 RX ADMIN — ASPIRIN 81 MG: 81 TABLET, CHEWABLE ORAL at 08:55

## 2023-07-30 RX ADMIN — FLUOXETINE HYDROCHLORIDE 40 MG: 20 CAPSULE ORAL at 08:57

## 2023-07-30 RX ADMIN — GABAPENTIN 300 MG: 300 CAPSULE ORAL at 08:57

## 2023-07-30 NOTE — CASE MANAGEMENT/SOCIAL WORK
Discharge Planning Assessment  [facility]     Patient Name: Alannah Barr  MRN: [id number]  Today's Date: 7/30/2023    Admit Date: 7/28/2023        Discharge Needs Assessment       Row Name 07/30/23 0808       Living Environment    People in Home alone    Current Living Arrangements home    Potentially Unsafe Housing Conditions none    Primary Care Provided by self    Provides Primary Care For no one    Family Caregiver if Needed child(cielo), adult    Quality of Family Relationships helpful;involved    Able to Return to Prior Arrangements yes       Resource/Environmental Concerns    Resource/Environmental Concerns none    Transportation Concerns none       Transition Planning    Patient/Family Anticipates Transition to home    Patient/Family Anticipated Services at Transition none    Transportation Anticipated family or friend will provide       Discharge Needs Assessment    Readmission Within the Last 30 Days no previous admission in last 30 days    Equipment Currently Used at Home none    Concerns to be Addressed denies needs/concerns at this time    Anticipated Changes Related to Illness none    Equipment Needed After Discharge none    Discharge Coordination/Progress PT resides at home alone and has a daughter who can assist as needed. PT was independent and worked outside the home prior to admission. PT has expressed thoughts of suicide during this admission. PT has been cleared for DC and has a [facility] consult. SW has notified [facility] of the eval request and RN has faxed the form and facesheet. [facility] has 24 hours to respond to the eval request.                   Discharge Plan    No documentation.                 Continued Care and Services - Admitted Since 7/28/2023    Coordination has not been started for this encounter.          Demographic Summary    No documentation.                  Functional Status    No documentation.                  Psychosocial    No documentation.                   Abuse/Neglect    No documentation.                  Legal    No documentation.                  Substance Abuse    No documentation.                  Patient Forms    No documentation.                     SHANKAR Dickerson

## 2023-07-30 NOTE — NURSING NOTE
Spoke with Dr. Rajput at 7:45 AM regarding needing medical clearance for [facility] to evaluate. He gave me the ok to go ahead and send referral to them. Alessia Torres called and spoke with Nancy with [facility]. I went ahead and faxed referral at 0810.  Rain Broussard, RN  7/30/2023  08:12 CDT

## 2023-07-30 NOTE — NURSING NOTE
Patient currently on zoom call with [facility] being evaluated.   Rain Broussard RN  7/30/2023  11:02 CDT

## 2023-07-30 NOTE — NURSING NOTE
DC instructions given to patient by Nena Enriquez RN. Scale and CHF booklet given to patient also before discharge home.

## 2023-07-30 NOTE — OUTREACH NOTE
Last Appt:  12/27/2019  Next Appt:   4/2/2020  Med verified in Epic Prep Survey      Flowsheet Row Responses   [facility] facility patient discharged from? [facility]   Is LACE score < 7 ? No   Eligibility Readm Mgmt   Discharge diagnosis Unrelieved chest pain   Does the patient have one of the following disease processes/diagnoses(primary or secondary)? Acute MI (STEMI,NSTEMI)   Does the patient have Home health ordered? No   Is there a DME ordered? No   Prep survey completed? Yes            JAUN RODRIGUEZ - Registered Nurse

## 2023-07-30 NOTE — DISCHARGE SUMMARY
[facility] Medicine Services  DISCHARGE SUMMARY       Date of Admission: 7/28/2023  Date of Discharge:  7/30/2023  Primary Care Physician: Provider, No Known    Presenting Problem/History of Present Illness:  Chest pain    Final Discharge Diagnoses:  Active Hospital Problems    Diagnosis     **NSTEMI (non-ST elevated myocardial infarction)     COPD (chronic obstructive pulmonary disease)     Tobacco dependence     Chronic pain syndrome     Chest pain due to myocardial ischemia        Consults:   Dr. Patrick, Cardiology    Procedures Performed:   Cardiac cath on 7/29 by Dr. Patrick with stent to LCX and OM    Pertinent Test Results:   Results for orders placed during the hospital encounter of 07/28/23    Adult Transthoracic Echo Complete W/ Cont if Necessary Per Protocol    Interpretation Summary    Left ventricular systolic function is mildly decreased. Left ventricular ejection fraction appears to be 51 - 55%.    Left ventricular wall thickness is consistent with borderline concentric hypertrophy.    The following left ventricular wall segments are hypokinetic: mid anterior, apical anterior, basal anterolateral, mid anterolateral, apical lateral and mid inferolateral.    Left ventricular diastolic function is consistent with (grade I) impaired relaxation.      Imaging Results (All)       Procedure Component Value Units Date/Time    CT Angiogram Chest [[id number]] Collected: 07/28/23 1911     Updated: 07/28/23 1921    Narrative:      Exam: CT angiogram of the of the chest with intravenous contrast.     CLINICAL HISTORY:  Chest pain radiating into back.     DOSE:  218 mGycm. All CT scans are performed using dose optimization  techniques as appropriate to the performed exam and including at least  one of the following: Automated exposure control, adjustment of the mA  and/or kV according to size, and the use of the iterative reconstruction  technique.     TECHNIQUE: Contiguous  axial images were obtained through the thorax  following intravenous contrast administration with reformatted images  obtained in the sagittal and coronal projections from the original data  set. Three-dimensional reconstructions are also obtained.     COMPARISON:  None available     FINDINGS:     Pulmonary arteries:  There is normal enhancement of the pulmonary  arteries with no evidence of pulmonary thromboembolic disease.     Aorta:  The thoracic aorta and proximal great vessels are normal in  appearance. There is no evidence of aortic dissection or aneurysm.     LUNGS:  No acute consolidative pneumonia or suspicious nodularity.     Pleural spaces: Unremarkable. No evidence of pleural effusion or  pneumothorax.     HEART: No evidence of cardiac enlargement. No pericardial effusion or  right ventricular dysfunction is present. Moderate coronary  calcifications are present.     Bones: No acute osseous abnormalities are demonstrated.     CHEST WALL: There is a focus of fat necrosis within the medial right  breast. No additional chest wall abnormalities are present.     Lymph nodes: No enlarged mediastinal or axillary lymphadenopathy is  present.     Upper abdomen: Limited images of the upper abdomen are unremarkable.       Impression:      1. No evidence of pulmonary thromboembolic disease. The thoracic aorta  is normal in caliber with no evidence of dissection or aneurysm. The  lungs are clear.  2. Coronary artery calcifications are present. No evidence of  cardiomegaly or pericardial effusion.  This report was finalized on 07/28/2023 19:18 by Dr. Devin Cornejo MD.    XR Chest 1 View [[id number]] Collected: 07/28/23 1843     Updated: 07/28/23 1847    Narrative:      EXAMINATION: Chest 1 view 07/28/2023     Comparison: None available     HISTORY:  Chest pain     One-view chest: Upright frontal projection of the chest is obtained. The  lungs are clear with no evidence of acute parenchymal  consolidation.  No  pleural effusion or free air is observed. The  mediastinal contours are  within normal limits.                                                                                                                         Impression:      Impression: No evidence of acute cardiopulmonary disease.  This report was finalized on 07/28/2023 18:43 by Dr. Devin Cornejo MD.          LAB RESULTS:      Lab 07/30/23 0441 07/29/23  1236 07/29/23 0321 07/28/23  1817   WBC 10.45  --  10.79 11.82*   HEMOGLOBIN 11.0*  --  11.9* 12.2   HEMATOCRIT 37.3  --  38.9 38.9   PLATELETS 221  --  259 280   NEUTROS ABS  --   --   --  6.21   EOS ABS  --   --   --  0.24   MCV 98.4*  --  97.0 93.7   SED RATE  --  61*  --   --    CRP  --   --  1.11*  --    PROTIME  --   --   --  11.7*   APTT  --   --   --  24.9   D DIMER QUANT  --   --   --  1.33*         Lab 07/30/23 0441 07/29/23 0321 07/28/23  1817   SODIUM 137 141 139   POTASSIUM 4.3 4.2 3.5   CHLORIDE 104 104 102   CO2 22.0 26.0 22.0   ANION GAP 11.0 11.0 15.0   BUN 8 12 8   CREATININE 0.57 0.67 0.61   EGFR 107.5 103.4 105.7   GLUCOSE 130* 109* 84   CALCIUM 8.5* 8.7 9.0   MAGNESIUM  --   --  2.0   HEMOGLOBIN A1C  --  6.10*  --              Lab 07/29/23 0321 07/29/23  0019 07/28/23 1959 07/28/23  1817   PROBNP  --   --   --  2,373.0*   HSTROP T 825* 760* 683* 563*   PROTIME  --   --   --  11.7*   INR  --   --   --  0.85*         Lab 07/29/23 0321   CHOLESTEROL 264*   LDL CHOL 154*   HDL CHOL 60   TRIGLYCERIDES 273*             Brief Urine Lab Results       None          Microbiology Results (last 10 days)       ** No results found for the last 240 hours. **            Hospital Course:   Patient is a 57-year-old female with a history of tobacco use, COPD, chronic pain who denies any significant heart failure or cardiac history who presented to outside facility with complaints of chest pain over several days.  She also been having issues with breathing and has been through several cycles  of steroids without improvement.  Her chest pain had been relieved with some nitroglycerin use.  Patient continued to have chest pain and her troponins continued to rise.  Case was discussed in the ER with cardiology service who asked the medicine team to admit.  She was admitted to the hospital and put on a nitroglycerin drip to the CCU with consult to cardiology.  He is evaluate by cardiology in the morning after admission and taken to the Cath Lab where she received stents to the LCX and OM.  She has been doing well post cath off nitro drip and is chest pain-free.  Feeling better.  Her echo showed a slight decreased EF of 51-55 with some wall motion abnormalities.  Cardiology has started her on a regimen of medicine to include Coreg, Aldactone, Entresto, aspirin, Plavix, atorvastatin and recommend continuing these medications through discharge.  Patient is to get repeat labs done in 1 week which have been ordered by cardiology and then follow-up with cardiology office with Dr. Patrick for ongoing care.  The importance of taking medications including her antiplatelets were discussed at length with the patient.  This was reaffirmed by myself, cardiology, nursing staff.  Scripts have been called in.    Of note throughout the admission process patient was emotionally labile and made some comments about wanting to hurt herself/kill herself.  These were potentially felt to be in just given her pain but for patient's safety she was placed on a hold and evaluated by psychiatry prior to discharge.  She was seen by [facility] we have cleared her from her psych hold and from their standpoint okay for discharge as well.    Patient is otherwise medically stable today and doing well.  No other acute ongoing medical issues to keep her hospitalized.  All questions answered.  Will discharge home with outpatient follow-ups as mentioned above.  Patient told to seek medical evaluation if any return or worsening of symptoms or any  "other acute medical changes.    Physical Exam on Discharge:  /79 (BP Location: Right arm, Patient Position: Lying)   Pulse 75   Temp 98.1 øF (36.7 øC) (Oral)   Resp 20   Ht 152.4 cm (60\")   Wt 70.3 kg (155 lb)   SpO2 97%   BMI 30.27 kg/mý   Physical Exam  GEN: Awake, alert, interactive, in NAD  HEENT: PERRLA, EOMI, Anicteric, Trachea midline  Lungs: no overt wheezes or rales  Heart: RRR, +S1/s2, no rub  ABD: soft, nt/nd, +BS, no guarding/rebound  Extremities: atraumatic, no cyanosis  Skin: no petechiae  Neuro: AAOx3, no focal deficits    Condition on Discharge: stable/improved    Discharge Disposition:  Home or Self Care    Discharge Medications:     Discharge Medications        New Medications        Instructions Start Date   aspirin 81 MG EC tablet   81 mg, Oral, Daily      atorvastatin 40 MG tablet  Commonly known as: LIPITOR   40 mg, Oral, Nightly      carvedilol 6.25 MG tablet  Commonly known as: COREG   6.25 mg, Oral, 2 Times Daily With Meals      clopidogrel 75 MG tablet  Commonly known as: PLAVIX   75 mg, Oral, Daily   Start Date: July 31, 2023     sacubitril-valsartan 24-26 MG tablet  Commonly known as: ENTRESTO   1 tablet, Oral, Daily With Breakfast   Start Date: July 31, 2023     spironolactone 25 MG tablet  Commonly known as: ALDACTONE   25 mg, Oral, Daily   Start Date: July 31, 2023            Continue These Medications        Instructions Start Date   Ambien 10 MG tablet  Generic drug: zolpidem   10 mg, Oral, Nightly PRN      FLUoxetine 20 MG capsule  Commonly known as: PROzac   40 mg, Oral, Daily      furosemide 40 MG tablet  Commonly known as: LASIX   40 mg, Oral, Daily PRN      gabapentin 300 MG capsule  Commonly known as: NEURONTIN   300 mg, Oral, 3 Times Daily      HYDROcodone-acetaminophen  MG per tablet  Commonly known as: NORCO   1 tablet, Oral, Every 6 Hours PRN      ondansetron 4 MG tablet  Commonly known as: ZOFRAN   4 mg, Oral, Every 8 Hours PRN      pantoprazole 40 MG EC " tablet  Commonly known as: PROTONIX   40 mg, Oral, 2 Times Daily      topiramate 50 MG tablet  Commonly known as: TOPAMAX   50 mg, Oral, 2 Times Daily             Stop These Medications      ibuprofen 800 MG tablet  Commonly known as: ADVIL,MOTRIN     methylPREDNISolone 4 MG dose pack  Commonly known as: MEDROL              Discharge Diet:   Dietary Orders (From admission, onward)       Start     Ordered    07/29/23 1116  Diet: Cardiac Diets; Healthy Heart (2-3 Na+); Texture: Regular Texture (IDDSI 7); Fluid Consistency: Thin (IDDSI 0)  Diet Effective Now        References:    Diet Order [facility]   Question Answer Comment   Diets: Cardiac Diets    Cardiac Diet: Healthy Heart (2-3 Na+)    Texture: Regular Texture (IDDSI 7)    Fluid Consistency: Thin (IDDSI 0)        07/29/23 1115                      Activity at Discharge:   Increase to baseline as tolerated    Follow-up Appointments:   No future appointments.    Test Results Pending at Discharge: none    Electronically signed by Win Rajput DO, 07/30/23, 11:30 CDT.    Time: 34 minutes.

## 2023-07-30 NOTE — PROGRESS NOTES
Enter Query Response Below      Query Response:   Type 1 MI due to plaque erosion/ rupture      Electronically signed by Osman Patrick, , 23, 6:21 PM CDT.       If applicable, please update the problem list.     Patient: Alannah Barr        : 1965  Account: [id number]           Admit Date:         How to Respond to this query:       a. Click New Note     b. Answer query within the yellow box.                c. Update the Problem List, if applicable.      If you have any questions about this query contact me at: [phone]     Dr. Patrick:    57 year old female admitted for NSTEMI.  Patient underwent cardiac cath with findings:  Coronary artery disease including significant lesions in the left circumflex 50-90% stenosis, large third obtuse marginal branch with 99% occlusion and mid LAD 70/80% occlusion. Patient underwent intervention with PCI to her left circumflex and obtuse marginal branches. The LAD was left for the future and will need to be staged PCI.  Progress notes include NSTEMI.      Please clarify the type of NSTEMI the patient was treated/monitored for:    Type 1 MI due to ______(please specify- plaque erosion, rupture, fissure or thrombus)  Type 2 MI due to fixed coronary artery disease  Other- specify______  Unable to determine      By submitting this query, we are merely seeking further clarification of documentation to accurately reflect all conditions that you are monitoring, evaluating, treating or that extend the hospitalization or utilize additional resources of care. Please utilize your independent clinical judgment when addressing the question(s) above.     This query and your response, once completed, will be entered into the legal medical record.    Sincerely,  Hillary Schreiber RN, MSN  Clinical Documentation Integrity Program   [email or web address]

## 2023-07-30 NOTE — PROGRESS NOTES
[facility] -  Progress Note     LOS: 2 days   Patient Care Team:  Provider, No Known as PCP - General    Chief Complaint: NSTEMI follow up    Subjective     Interval History:   S/p LHC yesterday with successful intervention to a LCX and OM lesion. Noted 70-80% mid-proximal LAD stenosis and 100% distal LAD occlusion which was left untreated with plans for a staged PCI, outpatient, in the near future. EF per LVgram was noted to be mildly reduced at 45% with echo noting EF to be 51-55%. She was started on Entresto this morning with BP and tolerating. Currently awaiting [place] psych eval and anxious and angry about being held at the hospital.         Review of Systems:   Review of Systems   Constitutional:  Negative for diaphoresis, fatigue and unexpected weight change.   Respiratory:  Negative for chest tightness, shortness of breath and wheezing.    Cardiovascular:  Negative for chest pain, palpitations and leg swelling.   Gastrointestinal:  Negative for diarrhea, nausea and vomiting.   Neurological:  Negative for dizziness, syncope and light-headedness.   All other systems reviewed and are negative.    Objective     Vital Sign Min/Max for last 24 hours  Temp  Min: 97.6 øF (36.4 øC)  Max: 98.1 øF (36.7 øC)   BP  Min: 118/82  Max: 131/80   Pulse  Min: 63  Max: 75   Resp  Min: 13  Max: 25   SpO2  Min: 94 %  Max: 98 %   No data recorded   Weight  Min: 69.9 kg (154 lb 3 oz)  Max: 70.3 kg (155 lb)         07/30/23  0010   Weight: 70.3 kg (155 lb)         Intake/Output Summary (Last 24 hours) at 7/30/2023 1235  Last data filed at 7/30/2023 0907  Gross per 24 hour   Intake 120 ml   Output --   Net 120 ml         Physical Exam:  Vitals reviewed.   Constitutional:       General: Not in acute distress.     Appearance: Healthy appearance. Well-developed. Not diaphoretic.   Eyes:      General: No scleral icterus.     Conjunctiva/sclera: Conjunctivae normal.      Pupils: Pupils are equal, round, and  reactive to light.   HENT:      Head: Normocephalic.    Mouth/Throat:      Pharynx: No oropharyngeal exudate.   Neck:      Vascular: No JVR.   Pulmonary:      Effort: Pulmonary effort is normal. No respiratory distress.      Breath sounds: Normal breath sounds. No wheezing. No rhonchi. No rales.   Chest:      Chest wall: Not tender to palpatation.   Cardiovascular:      Normal rate. Regular rhythm.   Pulses:     Intact distal pulses.   Edema:     Peripheral edema absent.   Abdominal:      General: Bowel sounds are normal. There is no distension.      Palpations: Abdomen is soft.      Tenderness: There is no abdominal tenderness.   Musculoskeletal: Normal range of motion.      Cervical back: Normal range of motion and neck supple. Skin:     General: Skin is warm and dry.      Coloration: Skin is not pale.      Findings: No erythema or rash.   Neurological:      Mental Status: Alert, oriented to person, place, and time and oriented to person, place and time.      Deep Tendon Reflexes: Reflexes are normal and symmetric.   Psychiatric:         Behavior: Behavior normal.        Results Review:   Lab Results (last 72 hours)       Procedure Component Value Units Date/Time    Basic Metabolic Panel [[id number]]  (Abnormal) Collected: 07/30/23 0441    Specimen: Blood Updated: 07/30/23 0556     Glucose 130 mg/dL      BUN 8 mg/dL      Creatinine 0.57 mg/dL      Sodium 137 mmol/L      Potassium 4.3 mmol/L      Chloride 104 mmol/L      CO2 22.0 mmol/L      Calcium 8.5 mg/dL      BUN/Creatinine Ratio 14.0     Anion Gap 11.0 mmol/L      eGFR 107.5 mL/min/1.73     Narrative:      GFR Normal >60  Chronic Kidney Disease <60  Kidney Failure <15      CK [[id number]]  (Abnormal) Collected: 07/30/23 0441    Specimen: Blood Updated: 07/30/23 0556     Creatine Kinase 539 U/L     CBC (No Diff) [[id number]]  (Abnormal) Collected: 07/30/23 0441    Specimen: Blood Updated: 07/30/23 0521     WBC 10.45 10*3/mm3      RBC 3.79 10*6/mm3      Hemoglobin  11.0 g/dL      Hematocrit 37.3 %      MCV 98.4 fL      MCH 29.0 pg      MCHC 29.5 g/dL      RDW 14.2 %      RDW-SD 51.5 fl      MPV 10.5 fL      Platelets 221 10*3/mm3     Sedimentation Rate [[id number]]  (Abnormal) Collected: 07/29/23 1236    Specimen: Blood Updated: 07/29/23 1248     Sed Rate 61 mm/hr     C-reactive Protein [[id number]]  (Abnormal) Collected: 07/29/23 0321    Specimen: Blood Updated: 07/29/23 1110     C-Reactive Protein 1.11 mg/dL     High Sensitivity Troponin T [[id number]]  (Abnormal) Collected: 07/29/23 0321    Specimen: Blood Updated: 07/29/23 0422     HS Troponin T 825 ng/L     Narrative:      High Sensitive Troponin T Reference Range:  <10.0 ng/L- Negative Female for AMI  <15.0 ng/L- Negative Male for AMI  >=10 - Abnormal Female indicating possible myocardial injury.  >=15 - Abnormal Male indicating possible myocardial injury.   Clinicians would have to utilize clinical acumen, EKG, Troponin, and serial changes to determine if it is an Acute Myocardial Infarction or myocardial injury due to an underlying chronic condition.         Basic Metabolic Panel [[id number]]  (Abnormal) Collected: 07/29/23 0321    Specimen: Blood Updated: 07/29/23 0419     Glucose 109 mg/dL      BUN 12 mg/dL      Creatinine 0.67 mg/dL      Sodium 141 mmol/L      Potassium 4.2 mmol/L      Comment: Slight hemolysis detected by analyzer. Results may be affected.        Chloride 104 mmol/L      CO2 26.0 mmol/L      Calcium 8.7 mg/dL      BUN/Creatinine Ratio 17.9     Anion Gap 11.0 mmol/L      eGFR 103.4 mL/min/1.73     Narrative:      GFR Normal >60  Chronic Kidney Disease <60  Kidney Failure <15      Hemoglobin A1c [[id number]]  (Abnormal) Collected: 07/29/23 0321    Specimen: Blood Updated: 07/29/23 0417     Hemoglobin A1C 6.10 %     Narrative:      Hemoglobin A1C Ranges:    Increased Risk for Diabetes  5.7% to 6.4%  Diabetes                     >= 6.5%  Diabetic Goal                < 7.0%    Lipid Panel [[id number]]   (Abnormal) Collected: 07/29/23 0321    Specimen: Blood Updated: 07/29/23 0403     Total Cholesterol 264 mg/dL      Triglycerides 273 mg/dL      HDL Cholesterol 60 mg/dL      LDL Cholesterol  154 mg/dL      VLDL Cholesterol 50 mg/dL      LDL/HDL Ratio 2.49    Narrative:      Cholesterol Reference Ranges  (U.S. Department of Health and Human Services ATP III Classifications)    Desirable          <200 mg/dL  Borderline High    200-239 mg/dL  High Risk          >240 mg/dL      Triglyceride Reference Ranges  (U.S. Department of Health and Human Services ATP III Classifications)    Normal           <150 mg/dL  Borderline High  150-199 mg/dL  High             200-499 mg/dL  Very High        >500 mg/dL    HDL Reference Ranges  (U.S. Department of Health and Human Services ATP III Classifications)    Low     <40 mg/dl (major risk factor for CHD)  High    >60 mg/dl ('negative' risk factor for CHD)        LDL Reference Ranges  (U.S. Department of Health and Human Services ATP III Classifications)    Optimal          <100 mg/dL  Near Optimal     100-129 mg/dL  Borderline High  130-159 mg/dL  High             160-189 mg/dL  Very High        >189 mg/dL    CK [[id number]]  (Abnormal) Collected: 07/29/23 0321    Specimen: Blood Updated: 07/29/23 0403     Creatine Kinase 828 U/L     CBC (No Diff) [[id number]]  (Abnormal) Collected: 07/29/23 0321    Specimen: Blood Updated: 07/29/23 0342     WBC 10.79 10*3/mm3      RBC 4.01 10*6/mm3      Hemoglobin 11.9 g/dL      Hematocrit 38.9 %      MCV 97.0 fL      MCH 29.7 pg      MCHC 30.6 g/dL      RDW 14.2 %      RDW-SD 50.7 fl      MPV 10.2 fL      Platelets 259 10*3/mm3     High Sensitivity Troponin T [[id number]]  (Abnormal) Collected: 07/29/23 0019    Specimen: Blood Updated: 07/29/23 0108     HS Troponin T 760 ng/L     Narrative:      High Sensitive Troponin T Reference Range:  <10.0 ng/L- Negative Female for AMI  <15.0 ng/L- Negative Male for AMI  >=10 - Abnormal Female indicating  possible myocardial injury.  >=15 - Abnormal Male indicating possible myocardial injury.   Clinicians would have to utilize clinical acumen, EKG, Troponin, and serial changes to determine if it is an Acute Myocardial Infarction or myocardial injury due to an underlying chronic condition.         High Sensitivity Troponin T 2Hr [[id number]]  (Abnormal) Collected: 07/28/23 1959    Specimen: Blood Updated: 07/28/23 2029     HS Troponin T 683 ng/L      Troponin T Delta 120 ng/L     Narrative:      High Sensitive Troponin T Reference Range:  <10.0 ng/L- Negative Female for AMI  <15.0 ng/L- Negative Male for AMI  >=10 - Abnormal Female indicating possible myocardial injury.  >=15 - Abnormal Male indicating possible myocardial injury.   Clinicians would have to utilize clinical acumen, EKG, Troponin, and serial changes to determine if it is an Acute Myocardial Infarction or myocardial injury due to an underlying chronic condition.         Manual Differential [[id number]]  (Abnormal) Collected: 07/28/23 1817    Specimen: Blood Updated: 07/28/23 1856     Neutrophil % 52.5 %      Lymphocyte % 35.6 %      Monocyte % 6.9 %      Eosinophil % 2.0 %      Atypical Lymphocyte % 3.0 %      Neutrophils Absolute 6.21 10*3/mm3      Lymphocytes Absolute 4.56 10*3/mm3      Monocytes Absolute 0.82 10*3/mm3      Eosinophils Absolute 0.24 10*3/mm3      Polychromasia Mod/2+     WBC Morphology Normal     Platelet Morphology Normal    CBC & Differential [[id number]]  (Abnormal) Collected: 07/28/23 1817    Specimen: Blood Updated: 07/28/23 1856    Narrative:      The following orders were created for panel order CBC & Differential.  Procedure                               Abnormality         Status                     ---------                               -----------         ------                     CBC Auto Differential[[id number]]        Abnormal            Final result                 Please view results for these tests on the individual  orders.    CBC Auto Differential [[id number]]  (Abnormal) Collected: 07/28/23 1817    Specimen: Blood Updated: 07/28/23 1856     WBC 11.82 10*3/mm3      RBC 4.15 10*6/mm3      Hemoglobin 12.2 g/dL      Hematocrit 38.9 %      MCV 93.7 fL      MCH 29.4 pg      MCHC 31.4 g/dL      RDW 14.4 %      RDW-SD 49.0 fl      MPV 10.0 fL      Platelets 280 10*3/mm3     Narrative:      The previously reported component NRBC is no longer being reported. Previous result was 0.0 /100 WBC (Reference Range: 0.0-0.2 /100 WBC) on 7/28/2023 at 1830 CDT.    Basic Metabolic Panel [[id number]]  (Normal) Collected: 07/28/23 1817    Specimen: Blood Updated: 07/28/23 1849     Glucose 84 mg/dL      BUN 8 mg/dL      Creatinine 0.61 mg/dL      Sodium 139 mmol/L      Potassium 3.5 mmol/L      Chloride 102 mmol/L      CO2 22.0 mmol/L      Calcium 9.0 mg/dL      BUN/Creatinine Ratio 13.1     Anion Gap 15.0 mmol/L      eGFR 105.7 mL/min/1.73     Narrative:      GFR Normal >60  Chronic Kidney Disease <60  Kidney Failure <15      Magnesium [[id number]]  (Normal) Collected: 07/28/23 1817    Specimen: Blood Updated: 07/28/23 1849     Magnesium 2.0 mg/dL     CK [[id number]]  (Abnormal) Collected: 07/28/23 1817    Specimen: Blood Updated: 07/28/23 1849     Creatine Kinase 705 U/L     High Sensitivity Troponin T [[id number]]  (Abnormal) Collected: 07/28/23 1817    Specimen: Blood Updated: 07/28/23 1849     HS Troponin T 563 ng/L     Narrative:      High Sensitive Troponin T Reference Range:  <10.0 ng/L- Negative Female for AMI  <15.0 ng/L- Negative Male for AMI  >=10 - Abnormal Female indicating possible myocardial injury.  >=15 - Abnormal Male indicating possible myocardial injury.   Clinicians would have to utilize clinical acumen, EKG, Troponin, and serial changes to determine if it is an Acute Myocardial Infarction or myocardial injury due to an underlying chronic condition.         BNP [[id number]]  (Abnormal) Collected: 07/28/23 2253    Specimen: Blood  "Updated: 07/28/23 1848     proBNP 2,373.0 pg/mL     Narrative:      Among patients with dyspnea, NT-proBNP is highly sensitive for the detection of acute congestive heart failure. In addition NT-proBNP of <300 pg/ml effectively rules out acute congestive heart failure with 99% negative predictive value.    Results may be falsely decreased if patient taking Biotin.      Protime-INR [[id number]]  (Abnormal) Collected: 07/28/23 1817    Specimen: Blood Updated: 07/28/23 1841     Protime 11.7 Seconds      INR 0.85    aPTT [[id number]]  (Normal) Collected: 07/28/23 1817    Specimen: Blood Updated: 07/28/23 1841     PTT 24.9 seconds     D-dimer, Quantitative [[id number]]  (Abnormal) Collected: 07/28/23 1817    Specimen: Blood Updated: 07/28/23 1841     D-Dimer, Quantitative 1.33 MCGFEU/mL     Narrative:      According to the assay 's published package insert, a normal (<0.50 MCGFEU/mL) D-dimer result in conjunction with a non-high clinical probability assessment, excludes deep vein thrombosis (DVT) and pulmonary embolism (PE) with high sensitivity.    D-dimer values increase with age and this can make VTE exclusion of an older population difficult. To address this, the [employer], based on best available evidence and recent guidelines, recommends that clinicians use age-adjusted D-dimer thresholds in patients greater than 50 years of age with: a) a low probability of PE who do not meet all Pulmonary Embolism Rule Out Criteria, or b) in those with intermediate probability of PE.   The formula for an age-adjusted D-dimer cut-off is \"age/100\".  For example, a 60 year old patient would have an age-adjusted cut-off of 0.60 MCGFEU/mL and an 80 year old 0.80 MCGFEU/mL.             Results for orders placed during the hospital encounter of 07/28/23    Adult Transthoracic Echo Complete W/ Cont if Necessary Per Protocol    Interpretation Summary    Left ventricular systolic function is mildly decreased. " Left ventricular ejection fraction appears to be 51 - 55%.    Left ventricular wall thickness is consistent with borderline concentric hypertrophy.    The following left ventricular wall segments are hypokinetic: mid anterior, apical anterior, basal anterolateral, mid anterolateral, apical lateral and mid inferolateral.    Left ventricular diastolic function is consistent with (grade I) impaired relaxation.          Medication Review: yes  Current Facility-Administered Medications   Medication Dose Route Frequency Provider Last Rate Last Admin    acetaminophen (TYLENOL) 160 MG/5ML solution 650 mg  650 mg Oral Q4H PRN Osman Patrick DO        Or    acetaminophen (TYLENOL) suppository 650 mg  650 mg Rectal Q4H PRN Osman Patrick DO        acetaminophen (TYLENOL) tablet 650 mg  650 mg Oral Q4H PRN Osman Patrick DO        albuterol (PROVENTIL) nebulizer solution 0.5% 2.5 mg/0.5mL  1.25 mg Nebulization 4x Daily - RT Osman Patrick DO   1.25 mg at 07/29/23 1428    And    ipratropium (ATROVENT) nebulizer solution 0.5 mg  0.5 mg Nebulization 4x Daily - RT Osman Patrick DO   0.5 mg at 07/29/23 1428    aspirin chewable tablet 81 mg  81 mg Oral Daily Osman Patrick DO   81 mg at 07/30/23 0855    atorvastatin (LIPITOR) tablet 40 mg  40 mg Oral Nightly Osman Patrick DO   40 mg at 07/29/23 2045    sennosides-docusate (PERICOLACE) 8.6-50 MG per tablet 1 tablet  1 tablet Oral Nightly PRN Osman Patrick DO        And    polyethylene glycol (MIRALAX) packet 17 g  17 g Oral Daily PRN Osman Patrick DO        And    bisacodyl (DULCOLAX) EC tablet 5 mg  5 mg Oral Daily PRN Osman Patrick DO        And    bisacodyl (DULCOLAX) suppository 10 mg  10 mg Rectal Daily PRN Osman Patrick DO        carvedilol (COREG) tablet 6.25 mg  6.25 mg Oral BID With Meals Osman Patrick DO   6.25 mg at 07/30/23  0857    Chlorhexidine Gluconate Cloth 2 % pads 1 application   1 application  Topical Q24H Osman Patrick DO   1 application  at 07/29/23 0616    clopidogrel (PLAVIX) tablet 75 mg  75 mg Oral Daily Osman Patrick DO   75 mg at 07/30/23 0857    FLUoxetine (PROzac) capsule 40 mg  40 mg Oral Daily Osman Patrick, DO   40 mg at 07/30/23 0857    gabapentin (NEURONTIN) capsule 300 mg  300 mg Oral TID Osman Patrick, DO   300 mg at 07/30/23 0857    HYDROcodone-acetaminophen (NORCO)  MG per tablet 1 tablet  1 tablet Oral Q6H PRN Osman Patrick DO   1 tablet at 07/30/23 0951    mupirocin (BACTROBAN) 2 % nasal ointment 1 application   1 application  Each Nare BID Osman Patrick,    1 application  at 07/30/23 0856    nicotine (NICODERM CQ) 14 MG/24HR patch 1 patch  1 patch Transdermal Q24H Osman Patrick DO        nitroglycerin (NITROSTAT) SL tablet 0.4 mg  0.4 mg Sublingual Q5 Min PRN Osman Patrick DO        ondansetron (ZOFRAN) injection 4 mg  4 mg Intravenous Q6H PRN Dianna Daniel APRN        ondansetron (ZOFRAN) tablet 4 mg  4 mg Oral Q6H PRN Osman Patrick DO        pantoprazole (PROTONIX) EC tablet 40 mg  40 mg Oral BID Osman Patrick DO   40 mg at 07/30/23 0857    predniSONE (DELTASONE) tablet 10 mg  10 mg Oral Daily With Breakfast Osman Patrick DO   10 mg at 07/29/23 0855    sacubitril-valsartan (ENTRESTO) 24-26 MG tablet 1 tablet  1 tablet Oral Daily With Breakfast Jagdish Willett MD   1 tablet at 07/30/23 0857    sodium chloride 0.9 % flush 10 mL  10 mL Intravenous PRN Osman Patrick DO        sodium chloride 0.9 % flush 10 mL  10 mL Intravenous Q12H Dianna Daniel APRN   10 mL at 07/29/23 2045    sodium chloride 0.9 % flush 10 mL  10 mL Intravenous PRN Dianna Daniel APRN        sodium chloride 0.9 % infusion 40 mL  40 mL Intravenous PRN Dianna Daniel  L, APRN        spironolactone (ALDACTONE) tablet 25 mg  25 mg Oral Daily Osman Patrick, DO   25 mg at 07/30/23 0857    topiramate (TOPAMAX) tablet 50 mg  50 mg Oral BID Osman Patrick, DO   50 mg at 07/30/23 0856    zolpidem (AMBIEN) tablet 10 mg  10 mg Oral Nightly PRN Osman Patrick, DO   10 mg at 07/29/23 2045     Current Outpatient Medications   Medication Sig Dispense Refill    aspirin 81 MG EC tablet Take 1 tablet by mouth Daily. 30 tablet 2    atorvastatin (LIPITOR) 40 MG tablet Take 1 tablet by mouth Every Night for 30 days. 30 tablet 0    carvedilol (COREG) 6.25 MG tablet Take 1 tablet by mouth 2 (Two) Times a Day With Meals for 30 days. 60 tablet 0    [START ON 7/31/2023] clopidogrel (PLAVIX) 75 MG tablet Take 1 tablet by mouth Daily. 30 tablet 2    [START ON 7/31/2023] sacubitril-valsartan (ENTRESTO) 24-26 MG tablet Take 1 tablet by mouth Daily With Breakfast for 30 days. 30 tablet 0    [START ON 7/31/2023] spironolactone (ALDACTONE) 25 MG tablet Take 1 tablet by mouth Daily. 30 tablet 0    zolpidem (Ambien) 10 MG tablet Take 1 tablet by mouth At Night As Needed for Sleep.      FLUoxetine (PROzac) 20 MG capsule Take 2 capsules by mouth Daily.      furosemide (LASIX) 40 MG tablet Take 1 tablet by mouth Daily As Needed.      gabapentin (NEURONTIN) 300 MG capsule Take 1 capsule by mouth 3 (Three) Times a Day.      HYDROcodone-acetaminophen (NORCO)  MG per tablet Take 1 tablet by mouth Every 6 (Six) Hours As Needed for Moderate Pain.      ondansetron (ZOFRAN) 4 MG tablet Take 1 tablet by mouth Every 8 (Eight) Hours As Needed for Nausea or Vomiting.      pantoprazole (PROTONIX) 40 MG EC tablet Take 1 tablet by mouth 2 (Two) Times a Day.      topiramate (TOPAMAX) 50 MG tablet Take 1 tablet by mouth 2 (Two) Times a Day.           Assessment & Plan       NSTEMI (non-ST elevated myocardial infarction)    COPD (chronic obstructive pulmonary disease)    Tobacco dependence     Chronic pain syndrome    Chest pain due to myocardial ischemia    Plan:   NSTEMI/CAD: stenting to the LCX and OM yesterday. Plans for staged PCI in the near future with Dr. Patrick. Continue ASA, Plavix, and statin. Continue Entresto, coreg and aldactone. Will get labs in 1 week after d/c.     CK is elevated- spoke with Dr. Willett who felt this was secondary to NSTEMI and recommends she be d/c'd on lipitor despite elevated CK. Spoke with Dr. Rajput.     Patient is felt safe from a cardiology stand point. She will need to be seen in office by Dr. Patrick in 2-4 weeks to discuss further LAD PCI.     Further recommendations per Dr. Willett     Electronically signed by YENNI Ambrose, 07/30/23, 10:03 AM CDT.    Time spent: 30 minutes

## 2023-07-31 LAB
QT INTERVAL: 420 MS
QTC INTERVAL: 433 MS

## 2023-07-31 NOTE — PROGRESS NOTES
"Enter Query Response Below      Query Response: I believe this was acute diastolic and systolic CHF.  I believe that her coronary artery disease with her non-STEMI is responsible for both systolic and diastolic left ventricular dysfunction.  I hope this answers your questions.             If applicable, please update the problem list.     Patient: Alannah Barr        : 1965  Account: [id number]           Admit Date:         How to Respond to this query:       a. Click New Note     b. Answer query within the yellow box.                c. Update the Problem List, if applicable.      If you have any questions about this query contact me at: [phone]     Dr. Willett:    57 year old female admitted for NSTEMI.  Labs include proBNP 2,373.0.  Notes indicate patient denies any significant heart failure or cardiac history.  Home medications include furosemide.  Echo summary showed left ventricular wall segments are hypokinetic: mid anterior, apical anterior, basal anterolateral, mid anterolateral, apical lateral and mid inferolateral with avg E/e' ratio 9.44.  Cardiology note states \"Elevated BNP and CHF. This was probably combined systolic and diastolic CHF.\"  EF per LVgram was noted to be mildly reduced at 45% with echo noting EF to be 51-55%.  Patient was started on Entresto, coreg and aldactone.  Patient also underwent cardiac cath with intervention to left circumflex and obtuse marginal branch.  Cardiac cath impression states \"history of congestive heart failure.\"      Can this be further clarified as:    - chronic diastolic and systolic heart failure only  - acute on chronic diastolic and systolic heart failure (please specify additional clinical indicators) ___________  - other (specify) _________  - unable to determine       By submitting this query, we are merely seeking further clarification of documentation to accurately reflect all conditions that you are monitoring, evaluating, " treating or that extend the hospitalization or utilize additional resources of care. Please utilize your independent clinical judgment when addressing the question(s) above.     This query and your response, once completed, will be entered into the legal medical record.    Sincerely,  Hillary Schreiber RN, MSN  Clinical Documentation Integrity Program   [email or web address]

## 2023-08-02 ENCOUNTER — TELEPHONE (OUTPATIENT)
Dept: CARDIOLOGY | Facility: CLINIC | Age: 58
End: 2023-08-02
Payer: COMMERCIAL

## 2023-08-02 ENCOUNTER — READMISSION MANAGEMENT (OUTPATIENT)
Dept: CALL CENTER | Facility: HOSPITAL | Age: 58
End: 2023-08-02
Payer: COMMERCIAL

## 2023-08-08 ENCOUNTER — READMISSION MANAGEMENT (OUTPATIENT)
Dept: CALL CENTER | Facility: HOSPITAL | Age: 58
End: 2023-08-08
Payer: COMMERCIAL

## 2023-08-08 NOTE — OUTREACH NOTE
AMI Week 2 Survey      Flowsheet Row Responses   [facility] facility patient discharged from? [place]   Does the patient have one of the following disease processes/diagnoses(primary or secondary)? Acute MI (STEMI,NSTEMI)   Week 2 attempt successful? No   Unsuccessful attempts Attempt 1            ISABEL SCOTT - Registered Nurse

## 2023-08-10 ENCOUNTER — OFFICE VISIT (OUTPATIENT)
Dept: CARDIOLOGY | Facility: CLINIC | Age: 58
End: 2023-08-10
Payer: COMMERCIAL

## 2023-08-10 VITALS
WEIGHT: 156 LBS | HEIGHT: 60 IN | HEART RATE: 74 BPM | OXYGEN SATURATION: 98 % | DIASTOLIC BLOOD PRESSURE: 82 MMHG | BODY MASS INDEX: 30.63 KG/M2 | SYSTOLIC BLOOD PRESSURE: 120 MMHG

## 2023-08-10 DIAGNOSIS — J44.9 CHRONIC OBSTRUCTIVE PULMONARY DISEASE, UNSPECIFIED COPD TYPE: ICD-10-CM

## 2023-08-10 DIAGNOSIS — I25.118 CORONARY ARTERY DISEASE OF NATIVE ARTERY OF NATIVE HEART WITH STABLE ANGINA PECTORIS: Primary | ICD-10-CM

## 2023-08-10 DIAGNOSIS — I20.8 STABLE ANGINA PECTORIS: ICD-10-CM

## 2023-08-10 DIAGNOSIS — F17.200 TOBACCO DEPENDENCE: ICD-10-CM

## 2023-08-10 RX ORDER — RANOLAZINE 500 MG/1
500 TABLET, EXTENDED RELEASE ORAL 2 TIMES DAILY
Qty: 60 TABLET | Refills: 5 | Status: SHIPPED | OUTPATIENT
Start: 2023-08-10

## 2023-08-10 NOTE — PROGRESS NOTES
"     Subjective:     Encounter Date:08/10/2023      Patient ID: Alannah Barr is a 57 y.o. female.    Chief Complaint:  History of Present Illness    The patient presents today for a follow-up visit. Her daughter is present via [facility].    The patient suffered a non-ST elevation myocardial infarction, and on 07/29/2023, she underwent cardiac catheterization, multiple balloon angioplasties, and placement of 3 stents. Two stents were placed in the third obtuse marginal artery. One stent was placed in the left circumflex artery. Prior to stenting, the left circumflex artery demonstrated mild disease in the proximal segment and severely diseased mid and distal segments with stenoses ranging from 50 to 90 percent. Prior to stenting, the third obtuse marginal artery demonstrated 99 percent subtotal occlusion. Her left anterior descending artery demonstrated mild disease in the proximal segment, 70 to 80 percent stenosis in the midsegment, a possible 100 percent occlusion in the very distal portion of the vessel. The left anterior descending artery was not intervened upon, and the plan was to address this in the future.    For the first week following cardiac catheterization, she experienced \"twinges\" of chest pain and continues to experience intermittent \"little twinges\". Currently she denies experiencing chest pain similar to that she experienced with myocardial infarction. On the day of her cardiac catheterization, she developed unusually severe indigestion and nausea. She had been experiencing intermittent bilateral shoulder pain for 2 days prior. The patient notes that she was severely fatigued for 2 years prior to myocardial infarction, which she attributed to working 60 to 90 hours per week, although she felt fatigued on her days off as well. Her fianc‚ passed away 2 years ago, and she suspected grief contributed to her fatigue. Since cardiac catheterization, she feels generally improved though " "persistently fatigued. She has not experienced a migraine since cardiac catheterization. She denies receiving a stent card at discharge.     Chart review indicates the patient was initially admitted to [facility] with dyspnea, chest pain, cough, and wheezing for 2 weeks and near-constant anterior chest tightness for 2 days, and a chronic obstructive pulmonary disease was initially suspected. Currently she reports dyspnea with brisk ambulation, which she attributes to deconditioning, as she ambulated frequently throughout her workday prior to myocardial infarction and is less active currently. She is attempting to gradually increase her ambulation.    Today her blood pressure is 120/82 mmHg, and her heart rate is normal. She states that her blood pressure has fluctuated over the past 2 years.    The patient takes aspirin 81 mg daily, Plavix, Lipitor, Coreg, Lasix as needed, Entresto twice daily, and spironolactone.    The patient reports a past surgical history of 17 surgeries.        Review of Systems   Constitutional: Positive for malaise/fatigue. Negative for diaphoresis and fever.   HENT:  Negative for congestion.    Eyes:  Negative for vision loss in left eye and vision loss in right eye.   Cardiovascular:  Positive for dyspnea on exertion. Negative for chest pain, claudication, irregular heartbeat, leg swelling, orthopnea, palpitations and syncope.        Positive for \"twinges\" of chest pain.   Respiratory:  Negative for cough, shortness of breath and wheezing.    Hematologic/Lymphatic: Negative for adenopathy.   Skin:  Negative for rash.   Musculoskeletal:  Negative for joint pain and joint swelling.   Gastrointestinal:  Negative for abdominal pain, diarrhea, nausea and vomiting.   Neurological:  Negative for excessive daytime sleepiness, dizziness, focal weakness, light-headedness, numbness and weakness.   Psychiatric/Behavioral:  Negative for depression. The patient does not have insomnia.          Current " Outpatient Medications:     aspirin 81 MG EC tablet, Take 1 tablet by mouth Daily., Disp: 30 tablet, Rfl: 2    atorvastatin (LIPITOR) 40 MG tablet, Take 1 tablet by mouth Every Night for 30 days., Disp: 30 tablet, Rfl: 0    carvedilol (COREG) 6.25 MG tablet, Take 1 tablet by mouth 2 (Two) Times a Day With Meals for 30 days., Disp: 60 tablet, Rfl: 0    clopidogrel (PLAVIX) 75 MG tablet, Take 1 tablet by mouth Daily., Disp: 30 tablet, Rfl: 2    FLUoxetine (PROzac) 20 MG capsule, Take 2 capsules by mouth Daily., Disp: , Rfl:     furosemide (LASIX) 40 MG tablet, Take 1 tablet by mouth Daily As Needed., Disp: , Rfl:     gabapentin (NEURONTIN) 300 MG capsule, Take 1 capsule by mouth 3 (Three) Times a Day., Disp: , Rfl:     HYDROcodone-acetaminophen (NORCO)  MG per tablet, Take 1 tablet by mouth Every 6 (Six) Hours As Needed for Moderate Pain., Disp: , Rfl:     ondansetron (ZOFRAN) 4 MG tablet, Take 1 tablet by mouth Every 8 (Eight) Hours As Needed for Nausea or Vomiting., Disp: , Rfl:     pantoprazole (PROTONIX) 40 MG EC tablet, Take 1 tablet by mouth 2 (Two) Times a Day., Disp: , Rfl:     sacubitril-valsartan (ENTRESTO) 24-26 MG tablet, Take 1 tablet by mouth 2 (Two) Times a Day for 180 days., Disp: 60 tablet, Rfl: 5    spironolactone (ALDACTONE) 25 MG tablet, Take 1 tablet by mouth Daily., Disp: 30 tablet, Rfl: 0    topiramate (TOPAMAX) 50 MG tablet, Take 1 tablet by mouth 2 (Two) Times a Day., Disp: , Rfl:     zolpidem (AMBIEN) 10 MG tablet, Take 1 tablet by mouth At Night As Needed for Sleep., Disp: , Rfl:     ranolazine (Ranexa) 500 MG 12 hr tablet, Take 1 tablet by mouth 2 (Two) Times a Day., Disp: 60 tablet, Rfl: 5       Objective:      Vitals:    08/10/23 1430   BP: 120/82   Pulse: 74   SpO2: 98%     Vitals and nursing note reviewed.   Constitutional:       Appearance: Normal and healthy appearance. Well-developed and not in distress.   Eyes:      Extraocular Movements: Extraocular movements intact.       Pupils: Pupils are equal, round, and reactive to light.   HENT:      Head: Normocephalic and atraumatic.    Mouth/Throat:      Pharynx: Oropharynx is clear.   Neck:      Vascular: JVD normal.      Trachea: Trachea normal.   Pulmonary:      Effort: Pulmonary effort is normal.      Breath sounds: Normal breath sounds. No wheezing. No rhonchi. No rales.   Cardiovascular:      PMI at left midclavicular line. Normal rate. Regular rhythm. Normal S1. Normal S2.       Murmurs: There is a grade 2/6 systolic murmur.      No gallop.  No click. No rub.   Pulses:     Dorsalis pedis: 2+ bilaterally.     Posterior tibial: 2+ bilaterally.  Abdominal:      General: Bowel sounds are normal.      Palpations: Abdomen is soft.      Tenderness: There is no abdominal tenderness.   Musculoskeletal: Normal range of motion.      Cervical back: Normal range of motion and neck supple. Skin:     General: Skin is warm and dry.      Capillary Refill: Capillary refill takes less than 2 seconds.   Feet:      Right foot:      Skin integrity: Skin integrity normal.      Left foot:      Skin integrity: Skin integrity normal.   Neurological:      Mental Status: Alert and oriented to person, place and time.      Cranial Nerves: Cranial nerves are intact.      Sensory: Sensation is intact.      Motor: Motor function is intact.      Coordination: Coordination is intact.   Psychiatric:         Speech: Speech normal.         Behavior: Behavior is cooperative.       Lab Review:       Procedures      Assessment/Plan:     Problem List Items Addressed This Visit          Cardiac and Vasculature    Chest pain due to myocardial ischemia    Coronary artery disease of native artery of native heart with stable angina pectoris - Primary    Relevant Medications    ranolazine (Ranexa) 500 MG 12 hr tablet    Other Relevant Orders    Case Request Cath Lab: Percutaneous Coronary Intervention (Completed)       Pulmonary and Pneumonias    COPD (chronic obstructive pulmonary  disease)       Tobacco    Tobacco dependence     1. History of myocardial infarction, status post stenting  2. Fatigue  3. Dyspnea on exertion    Plan  Outpatient percutaneous coronary intervention of her left anterior descending artery is scheduled for 08/16/2023. Ranexa twice daily is being started. She will continue her current medications as well.     Recommendations/plans:    Transcribed from ambient dictation for Osman Patrick DO by Genoveva Snell.  08/10/23   16:09 CDT    Patient or patient representative verbalized consent to the visit recording.  I have personally performed the services described in this document as transcribed by the above individual, and it is both accurate and complete.  Osman Patrick DO  8/15/2023  10:02 CDT

## 2023-08-11 ENCOUNTER — READMISSION MANAGEMENT (OUTPATIENT)
Dept: CALL CENTER | Facility: HOSPITAL | Age: 58
End: 2023-08-11
Payer: COMMERCIAL

## 2023-08-11 NOTE — OUTREACH NOTE
[facility] Week 2 Survey      Flowsheet Row Responses   [facility] facility patient discharged from? [facility] [place]   Does the patient have one of the following disease processes/diagnoses(primary or secondary)? Acute MI (STEMI,NSTEMI)   Week 2 attempt successful? No   Unsuccessful attempts Attempt 2            Neva MORAN - Registered Nurse

## 2023-08-15 PROBLEM — I25.118 CORONARY ARTERY DISEASE OF NATIVE ARTERY OF NATIVE HEART WITH STABLE ANGINA PECTORIS: Status: ACTIVE | Noted: 2023-08-15

## 2023-08-17 ENCOUNTER — READMISSION MANAGEMENT (OUTPATIENT)
Dept: CALL CENTER | Facility: HOSPITAL | Age: 58
End: 2023-08-17
Payer: COMMERCIAL

## 2023-08-17 ENCOUNTER — HOSPITAL ENCOUNTER (OUTPATIENT)
Facility: HOSPITAL | Age: 58
Discharge: HOME | End: 2023-08-18
Attending: EMERGENCY MEDICINE | Admitting: EMERGENCY MEDICINE
Payer: COMMERCIAL

## 2023-08-17 DIAGNOSIS — I25.118 CORONARY ARTERY DISEASE OF NATIVE ARTERY OF NATIVE HEART WITH STABLE ANGINA PECTORIS: ICD-10-CM

## 2023-08-17 PROBLEM — I25.10 CAD (CORONARY ARTERY DISEASE): Status: ACTIVE | Noted: 2023-08-17

## 2023-08-17 LAB
ANION GAP SERPL CALCULATED.3IONS-SCNC: 12 MMOL/L (ref 5–15)
BUN SERPL-MCNC: 8 MG/DL (ref 6–20)
BUN/CREAT SERPL: 9.3 (ref 7–25)
CALCIUM SPEC-SCNC: 9.1 MG/DL (ref 8.6–10.5)
CHLORIDE SERPL-SCNC: 105 MMOL/L (ref 98–107)
CO2 SERPL-SCNC: 22 MMOL/L (ref 22–29)
CREAT SERPL-MCNC: 0.86 MG/DL (ref 0.57–1)
DEPRECATED RDW RBC AUTO: 47.8 FL (ref 37–54)
EGFRCR SERPLBLD CKD-EPI 2021: 79.9 ML/MIN/1.73
ERYTHROCYTE [DISTWIDTH] IN BLOOD BY AUTOMATED COUNT: 14.2 % (ref 12.3–15.4)
GLUCOSE SERPL-MCNC: 98 MG/DL (ref 65–99)
HCT VFR BLD AUTO: 39.5 % (ref 34–46.6)
HGB BLD-MCNC: 12.3 G/DL (ref 12–15.9)
INR PPP: 0.92 (ref 0.91–1.09)
MCH RBC QN AUTO: 28.8 PG (ref 26.6–33)
MCHC RBC AUTO-ENTMCNC: 31.1 G/DL (ref 31.5–35.7)
MCV RBC AUTO: 92.5 FL (ref 79–97)
PLATELET # BLD AUTO: 328 10*3/MM3 (ref 140–450)
PMV BLD AUTO: 10.6 FL (ref 6–12)
POTASSIUM SERPL-SCNC: 4.4 MMOL/L (ref 3.5–5.2)
PROTHROMBIN TIME: 12.5 SECONDS (ref 11.8–14.8)
RBC # BLD AUTO: 4.27 10*6/MM3 (ref 3.77–5.28)
SODIUM SERPL-SCNC: 139 MMOL/L (ref 136–145)
WBC NRBC COR # BLD: 10.09 10*3/MM3 (ref 3.4–10.8)

## 2023-08-17 PROCEDURE — C1894 INTRO/SHEATH, NON-LASER: HCPCS | Performed by: EMERGENCY MEDICINE

## 2023-08-17 PROCEDURE — G0378 HOSPITAL OBSERVATION PER HR: HCPCS

## 2023-08-17 PROCEDURE — 99152 MOD SED SAME PHYS/QHP 5/>YRS: CPT | Performed by: EMERGENCY MEDICINE

## 2023-08-17 PROCEDURE — 92921: CPT | Performed by: EMERGENCY MEDICINE

## 2023-08-17 PROCEDURE — C1769 GUIDE WIRE: HCPCS | Performed by: EMERGENCY MEDICINE

## 2023-08-17 PROCEDURE — 80048 BASIC METABOLIC PNL TOTAL CA: CPT | Performed by: EMERGENCY MEDICINE

## 2023-08-17 PROCEDURE — C1760 CLOSURE DEV, VASC: HCPCS | Performed by: EMERGENCY MEDICINE

## 2023-08-17 PROCEDURE — 25010000002 FENTANYL CITRATE (PF) 50 MCG/ML SOLUTION: Performed by: EMERGENCY MEDICINE

## 2023-08-17 PROCEDURE — C1874 STENT, COATED/COV W/DEL SYS: HCPCS | Performed by: EMERGENCY MEDICINE

## 2023-08-17 PROCEDURE — 25010000002 MIDAZOLAM PER 1 MG: Performed by: EMERGENCY MEDICINE

## 2023-08-17 PROCEDURE — 25010000002 DIPHENHYDRAMINE PER 50 MG: Performed by: EMERGENCY MEDICINE

## 2023-08-17 PROCEDURE — 92920 PRQ TRLUML C ANGIOP 1ART&/BR: CPT | Performed by: EMERGENCY MEDICINE

## 2023-08-17 PROCEDURE — 99153 MOD SED SAME PHYS/QHP EA: CPT | Performed by: EMERGENCY MEDICINE

## 2023-08-17 PROCEDURE — C1725 CATH, TRANSLUMIN NON-LASER: HCPCS | Performed by: EMERGENCY MEDICINE

## 2023-08-17 PROCEDURE — S0260 H&P FOR SURGERY: HCPCS | Performed by: EMERGENCY MEDICINE

## 2023-08-17 PROCEDURE — C1887 CATHETER, GUIDING: HCPCS | Performed by: EMERGENCY MEDICINE

## 2023-08-17 PROCEDURE — C9600 PERC DRUG-EL COR STENT SING: HCPCS | Performed by: EMERGENCY MEDICINE

## 2023-08-17 PROCEDURE — 92928 PRQ TCAT PLMT NTRAC ST 1 LES: CPT | Performed by: EMERGENCY MEDICINE

## 2023-08-17 PROCEDURE — 85610 PROTHROMBIN TIME: CPT | Performed by: EMERGENCY MEDICINE

## 2023-08-17 PROCEDURE — 85027 COMPLETE CBC AUTOMATED: CPT | Performed by: EMERGENCY MEDICINE

## 2023-08-17 PROCEDURE — 25010000002 BIVALIRUDIN TRIFLUOROACETATE 250 MG RECONSTITUTED SOLUTION: Performed by: EMERGENCY MEDICINE

## 2023-08-17 PROCEDURE — 25010000002 HEPARIN (PORCINE) 1000-0.9 UT/500ML-% SOLUTION: Performed by: EMERGENCY MEDICINE

## 2023-08-17 PROCEDURE — 25510000001 IOPAMIDOL PER 1 ML: Performed by: EMERGENCY MEDICINE

## 2023-08-17 PROCEDURE — 25010000002 HEPARIN (PORCINE) 2000-0.9 UNIT/L-% SOLUTION: Performed by: EMERGENCY MEDICINE

## 2023-08-17 DEVICE — XIENCE SKYPOINT™ EVEROLIMUS ELUTING CORONARY STENT SYSTEM 2.25 MM X 18 MM / RAPID-EXCHANGE
Type: IMPLANTABLE DEVICE | Site: HEART | Status: FUNCTIONAL
Brand: XIENCE SKYPOINT™

## 2023-08-17 RX ORDER — DIPHENHYDRAMINE HYDROCHLORIDE 50 MG/ML
INJECTION INTRAMUSCULAR; INTRAVENOUS
Status: DISCONTINUED | OUTPATIENT
Start: 2023-08-17 | End: 2023-08-17 | Stop reason: HOSPADM

## 2023-08-17 RX ORDER — FENTANYL CITRATE 50 UG/ML
INJECTION, SOLUTION INTRAMUSCULAR; INTRAVENOUS
Status: DISCONTINUED | OUTPATIENT
Start: 2023-08-17 | End: 2023-08-17 | Stop reason: HOSPADM

## 2023-08-17 RX ORDER — ASPIRIN 81 MG/1
81 TABLET ORAL DAILY
Status: DISCONTINUED | OUTPATIENT
Start: 2023-08-18 | End: 2023-08-18 | Stop reason: HOSPADM

## 2023-08-17 RX ORDER — LIDOCAINE HYDROCHLORIDE 20 MG/ML
INJECTION, SOLUTION INFILTRATION; PERINEURAL
Status: DISCONTINUED | OUTPATIENT
Start: 2023-08-17 | End: 2023-08-17 | Stop reason: HOSPADM

## 2023-08-17 RX ORDER — ONDANSETRON 4 MG/1
4 TABLET, FILM COATED ORAL EVERY 6 HOURS PRN
Status: DISCONTINUED | OUTPATIENT
Start: 2023-08-17 | End: 2023-08-18 | Stop reason: HOSPADM

## 2023-08-17 RX ORDER — SODIUM CHLORIDE 0.9 % (FLUSH) 0.9 %
10 SYRINGE (ML) INJECTION AS NEEDED
Status: DISCONTINUED | OUTPATIENT
Start: 2023-08-17 | End: 2023-08-18 | Stop reason: HOSPADM

## 2023-08-17 RX ORDER — RANOLAZINE 500 MG/1
500 TABLET, EXTENDED RELEASE ORAL 2 TIMES DAILY
Status: DISCONTINUED | OUTPATIENT
Start: 2023-08-17 | End: 2023-08-18 | Stop reason: HOSPADM

## 2023-08-17 RX ORDER — SODIUM CHLORIDE 0.9 % (FLUSH) 0.9 %
10 SYRINGE (ML) INJECTION EVERY 12 HOURS SCHEDULED
Status: DISCONTINUED | OUTPATIENT
Start: 2023-08-17 | End: 2023-08-18 | Stop reason: HOSPADM

## 2023-08-17 RX ORDER — CLOPIDOGREL BISULFATE 75 MG/1
TABLET ORAL
Status: DISCONTINUED | OUTPATIENT
Start: 2023-08-17 | End: 2023-08-17 | Stop reason: HOSPADM

## 2023-08-17 RX ORDER — TOPIRAMATE 25 MG/1
50 TABLET ORAL 2 TIMES DAILY
Status: DISCONTINUED | OUTPATIENT
Start: 2023-08-17 | End: 2023-08-18 | Stop reason: HOSPADM

## 2023-08-17 RX ORDER — CARVEDILOL 6.25 MG/1
6.25 TABLET ORAL 2 TIMES DAILY WITH MEALS
Status: DISCONTINUED | OUTPATIENT
Start: 2023-08-17 | End: 2023-08-18 | Stop reason: HOSPADM

## 2023-08-17 RX ORDER — HEPARIN SODIUM 200 [USP'U]/100ML
INJECTION, SOLUTION INTRAVENOUS
Status: DISCONTINUED | OUTPATIENT
Start: 2023-08-17 | End: 2023-08-17 | Stop reason: HOSPADM

## 2023-08-17 RX ORDER — MIDAZOLAM HYDROCHLORIDE 1 MG/ML
INJECTION INTRAMUSCULAR; INTRAVENOUS
Status: DISCONTINUED | OUTPATIENT
Start: 2023-08-17 | End: 2023-08-17 | Stop reason: HOSPADM

## 2023-08-17 RX ORDER — NITROGLYCERIN 0.4 MG/1
0.4 TABLET SUBLINGUAL
Status: DISCONTINUED | OUTPATIENT
Start: 2023-08-17 | End: 2023-08-18 | Stop reason: HOSPADM

## 2023-08-17 RX ORDER — ATORVASTATIN CALCIUM 40 MG/1
40 TABLET, FILM COATED ORAL NIGHTLY
Status: DISCONTINUED | OUTPATIENT
Start: 2023-08-17 | End: 2023-08-18 | Stop reason: HOSPADM

## 2023-08-17 RX ORDER — FLUOXETINE HYDROCHLORIDE 20 MG/1
40 CAPSULE ORAL DAILY
Status: DISCONTINUED | OUTPATIENT
Start: 2023-08-17 | End: 2023-08-18 | Stop reason: HOSPADM

## 2023-08-17 RX ORDER — SODIUM CHLORIDE 9 MG/ML
100 INJECTION, SOLUTION INTRAVENOUS CONTINUOUS
Status: DISCONTINUED | OUTPATIENT
Start: 2023-08-17 | End: 2023-08-18 | Stop reason: HOSPADM

## 2023-08-17 RX ORDER — ONDANSETRON 2 MG/ML
4 INJECTION INTRAMUSCULAR; INTRAVENOUS EVERY 6 HOURS PRN
Status: DISCONTINUED | OUTPATIENT
Start: 2023-08-17 | End: 2023-08-18 | Stop reason: HOSPADM

## 2023-08-17 RX ORDER — PANTOPRAZOLE SODIUM 40 MG/1
40 TABLET, DELAYED RELEASE ORAL
Status: DISCONTINUED | OUTPATIENT
Start: 2023-08-17 | End: 2023-08-18 | Stop reason: HOSPADM

## 2023-08-17 RX ORDER — SUMATRIPTAN 50 MG/1
50 TABLET, FILM COATED ORAL
COMMUNITY

## 2023-08-17 RX ORDER — MELOXICAM 15 MG/1
15 TABLET ORAL DAILY
COMMUNITY

## 2023-08-17 RX ORDER — ACETAMINOPHEN 325 MG/1
650 TABLET ORAL EVERY 4 HOURS PRN
Status: DISCONTINUED | OUTPATIENT
Start: 2023-08-17 | End: 2023-08-18 | Stop reason: HOSPADM

## 2023-08-17 RX ORDER — ZOLPIDEM TARTRATE 5 MG/1
10 TABLET ORAL NIGHTLY PRN
Status: DISCONTINUED | OUTPATIENT
Start: 2023-08-17 | End: 2023-08-18 | Stop reason: HOSPADM

## 2023-08-17 RX ORDER — ONDANSETRON 4 MG/1
4 TABLET, FILM COATED ORAL EVERY 8 HOURS PRN
Status: DISCONTINUED | OUTPATIENT
Start: 2023-08-17 | End: 2023-08-17 | Stop reason: SDUPTHER

## 2023-08-17 RX ORDER — HYDROCODONE BITARTRATE AND ACETAMINOPHEN 10; 325 MG/1; MG/1
1 TABLET ORAL EVERY 6 HOURS PRN
Status: DISCONTINUED | OUTPATIENT
Start: 2023-08-17 | End: 2023-08-18 | Stop reason: HOSPADM

## 2023-08-17 RX ORDER — SPIRONOLACTONE 25 MG/1
25 TABLET ORAL DAILY
Status: DISCONTINUED | OUTPATIENT
Start: 2023-08-18 | End: 2023-08-18 | Stop reason: HOSPADM

## 2023-08-17 RX ORDER — GABAPENTIN 300 MG/1
300 CAPSULE ORAL 3 TIMES DAILY
Status: DISCONTINUED | OUTPATIENT
Start: 2023-08-17 | End: 2023-08-18 | Stop reason: HOSPADM

## 2023-08-17 RX ORDER — TIZANIDINE 4 MG/1
4 TABLET ORAL EVERY 8 HOURS PRN
COMMUNITY

## 2023-08-17 RX ORDER — CLOPIDOGREL BISULFATE 75 MG/1
75 TABLET ORAL DAILY
Status: DISCONTINUED | OUTPATIENT
Start: 2023-08-18 | End: 2023-08-18 | Stop reason: HOSPADM

## 2023-08-17 RX ADMIN — Medication 10 ML: at 20:28

## 2023-08-17 RX ADMIN — TOPIRAMATE 50 MG: 25 TABLET, FILM COATED ORAL at 20:28

## 2023-08-17 RX ADMIN — RANOLAZINE 500 MG: 500 TABLET, FILM COATED, EXTENDED RELEASE ORAL at 20:28

## 2023-08-17 RX ADMIN — PANTOPRAZOLE SODIUM 40 MG: 40 TABLET, DELAYED RELEASE ORAL at 20:32

## 2023-08-17 RX ADMIN — SACUBITRIL AND VALSARTAN 1 TABLET: 24; 26 TABLET, FILM COATED ORAL at 20:28

## 2023-08-17 RX ADMIN — CARVEDILOL 6.25 MG: 6.25 TABLET, FILM COATED ORAL at 20:28

## 2023-08-17 RX ADMIN — ATORVASTATIN CALCIUM 40 MG: 40 TABLET, FILM COATED ORAL at 20:28

## 2023-08-17 RX ADMIN — HYDROCODONE BITARTRATE AND ACETAMINOPHEN 1 TABLET: 10; 325 TABLET ORAL at 13:44

## 2023-08-17 RX ADMIN — HYDROCODONE BITARTRATE AND ACETAMINOPHEN 1 TABLET: 10; 325 TABLET ORAL at 19:39

## 2023-08-17 RX ADMIN — SODIUM CHLORIDE 100 ML/HR: 9 INJECTION, SOLUTION INTRAVENOUS at 13:44

## 2023-08-17 RX ADMIN — ZOLPIDEM TARTRATE 10 MG: 5 TABLET ORAL at 20:44

## 2023-08-17 RX ADMIN — GABAPENTIN 300 MG: 300 CAPSULE ORAL at 20:32

## 2023-08-17 RX ADMIN — GABAPENTIN 300 MG: 300 CAPSULE ORAL at 17:15

## 2023-08-17 NOTE — OP NOTE
[facility]    Date of procedure: 8/17/2023     Procedures performed:     1.  Access to the right femoral artery via modified Seldinger technique  2.  Left heart catheterization without retrograde crossing of the aortic valve  3.  Selective left coronary angiography  4.  Successful PCI to the mid LAD with a Xience Skypoint 2.25 x 18 mm drug-eluting stent  5.  Successful PTCA to the mid LAD with a emerge MR 2 x 12 mm balloon  6.  Successful PTCA to the diagonal branch at the emerge MR 2 x 12 mm balloon  7.  Successful NC PTCA to the obtuse marginal branch with a NC trek Jean Claude 2 x 12 mm balloon  8.  Successful NC PTCA to the obtuse marginal branch with a NC trek Jean Claude 2.5 x 8 mm balloon  9.  Conscious sedation with continuous hemodynamic monitoring for 40 minutes  10.  Pain hemostasis achieved in the right femoral artery access site using a 6 [place]ian Angio-Seal closure device      Risk, Benefits, and Alternatives discussed with the patient and/or family.  Plan is for moderate sedation and the patient agrees to proceed with the procedure.  An immediate assessment was done prior to the administration of moderate sedation     Indication: Stage PCI, continuation of patient's symptoms from hospitalization last month including chest pain and dyspnea on exertion  Premedication: Versed, fentanyl  Contrast: Isovue 143 cc  Radiation: Flouro time= 8.6 minutes. Air Kerma= 762 mGy  Guiding catheter: XB 3.5  PCI Hardware: 0.014 BMW wire, 0.014 whisper export wire, Xience Skypoint 2.25 x 18 mm drug-eluting stent, emerge MR 2 x 12 mm balloon, NC trek Jean Claude 2 x 12 mm balloon, NC trek Jean Claude 2.5 x 8 mm balloon    Procedural details:    The patient was brought to the cath lab and prepped and draped in the usual fashion.  Access was obtained in the right femoral artery via modified Seldinger technique.  A 6 Ghanaian sheath was placed into the artery and flushed.  Next, an XB 3.5 catheter was inserted and used to engage the  left main.  Selective left coronary angiography was performed.  The BMW wire was inserted and advanced into the distal LAD.  The whisper support wire was then inserted and advanced into the diagonal branch.  We then inserted an emerge MR 2 x 12 mm balloon into the mid LAD where it was inflated multiple times.  Next, a Xience Skypoint 2.25 x 18 mm drug-eluting stent was inserted into the mid LAD where was deployed at 10 tere for 45 seconds.  Postintervention angiography was performed.  Next, an emerge MR 2 x 12 mm balloon was inserted into the diagonal branch where it was inflated.  Post angioplasty angiography was performed.  Next, the BMW wire was inserted into the obtuse marginal branch.  We inserted a trek NC Jean Claude 2 x 12 mm balloon into the ostium of the obtuse marginal where he was inflated to max pressure.  We still had significant stenosis so a trek NC Jean Claude 2.5 x 8 mm balloon was inserted into the ostial obtuse marginal where it was inflated.  Post angioplasty angiography was performed in multiple views.  Everything was then withdrawn through the sheath and the sheath was flushed.  Patent hemostasis was achieved in the right femoral artery access site using a 6 [place] Angio-Seal closure device.  Patient tolerated the procedure well without any complications.  She left the Cath Lab in stable condition.    I supervised the administration of conscious sedation by nursing staff throughout the case.  First dose was given at 1150 and the end of my face-to-face encounter was at 1230, accounting for a total of 40 minutes of supervision.  During the case, continuous pulse oximetry, heart rate, blood pressure, and patient status were monitored.     Findings:    Hemodynamics:    Please see dedicated hemodynamic sheet for pressures    Left ventriculography:  Not performed on this study.  Please see previous angiography report.      Selective coronary angiography:     Left main: Left main is a large-caliber vessel that  bifurcates into the LAD and left circumflex coronary arteries, no angiographic evidence of stenosis, ALEXY-3 flow    LAD: The LAD is a large-caliber vessel with mild disease in the proximal segment, 70 to 80% stenosis in the midsegment,, the remainder the vessel has mild luminal irregularities, ALEXY-3 flow.  Status post successful PTCA and PCI x1 stent we had continuation ALEXY-3 flow and no residual stenosis remaining in the mid vessel.    Diagonals: First diagonal is a large-caliber vessel.  Status post PCI to the LAD we had plaque shift that resulted and 70% stenosis at the ostium.  Status post successful PTCA we had continuation ALEXY-3 flow and no residual stenosis remaining    Left circumflex: Circumflex is a large-caliber vessel with patent stents present in the mid and distal segments with no in-stent stenosis appreciated, ALEXY-3 flow    Obtuse marginals: First OM is small caliber, the second OM is moderate caliber with mild disease, the third OM is large caliber with a patent stent present in the proximal and mid portion of the vessel with approximately 80 to 90% stenosis at the ostium.  Status post successful high inflation NC PTCA we had continuation of ALEXY-3 flow and approximately 20 to 30% stenosis remaining at the ostium.        Estimated Blood Loss: Minimal    Specimens: None    Complications: None       Impression:  1.  Coronary artery disease as described above including significant lesions in the LAD and third obtuse marginal branch that both underwent successful intervention today with continuation of ALEXY-3 flow and minimal right residual stenosis remaining  2.  CHF  3.  History of COPD  4.  Nicotine abuse     Plan:   1.  Continue on dual antiplatelet therapy with aspirin and Plavix  2.  Continue on high intensity statin  3.  Referral for for cardiac rehab  4.  Close follow-up in the [facility] cardiology clinic    Osman Patrick DO  Interventional cardiology  [facility]  August  17, 2023

## 2023-08-17 NOTE — Clinical Note
First balloon inflation max pressure = 16 tere. First balloon inflation duration = 20 seconds. Second inflation of balloon - Max pressure = 20 tere. 2nd Inflation of balloon - Duration = 20 seconds.

## 2023-08-17 NOTE — H&P
"Patient seen and examined at bedside.  The history and physical have not changed as below.    Risk, benefits and alternatives were explained to the patient and she wished to proceed.    We will be performing a diagnostic left heart catheterization with possible intervention based on findings.      Patient ID: Alannah Barr is a 57 y.o. female.     Chief Complaint:  History of Present Illness     The patient presents today for a follow-up visit. Her daughter is present via [facility].     The patient suffered a non-ST elevation myocardial infarction, and on 07/29/2023, she underwent cardiac catheterization, multiple balloon angioplasties, and placement of 3 stents. Two stents were placed in the third obtuse marginal artery. One stent was placed in the left circumflex artery. Prior to stenting, the left circumflex artery demonstrated mild disease in the proximal segment and severely diseased mid and distal segments with stenoses ranging from 50 to 90 percent. Prior to stenting, the third obtuse marginal artery demonstrated 99 percent subtotal occlusion. Her left anterior descending artery demonstrated mild disease in the proximal segment, 70 to 80 percent stenosis in the midsegment, a possible 100 percent occlusion in the very distal portion of the vessel. The left anterior descending artery was not intervened upon, and the plan was to address this in the future.     For the first week following cardiac catheterization, she experienced \"twinges\" of chest pain and continues to experience intermittent \"little twinges\". Currently she denies experiencing chest pain similar to that she experienced with myocardial infarction. On the day of her cardiac catheterization, she developed unusually severe indigestion and nausea. She had been experiencing intermittent bilateral shoulder pain for 2 days prior. The patient notes that she was severely fatigued for 2 years prior to myocardial infarction, which she attributed to " "working 60 to 90 hours per week, although she felt fatigued on her days off as well. Her fianc‚ passed away 2 years ago, and she suspected grief contributed to her fatigue. Since cardiac catheterization, she feels generally improved though persistently fatigued. She has not experienced a migraine since cardiac catheterization. She denies receiving a stent card at discharge.      Chart review indicates the patient was initially admitted to [facility] with dyspnea, chest pain, cough, and wheezing for 2 weeks and near-constant anterior chest tightness for 2 days, and a chronic obstructive pulmonary disease was initially suspected. Currently she reports dyspnea with brisk ambulation, which she attributes to deconditioning, as she ambulated frequently throughout her workday prior to myocardial infarction and is less active currently. She is attempting to gradually increase her ambulation.     Today her blood pressure is 120/82 mmHg, and her heart rate is normal. She states that her blood pressure has fluctuated over the past 2 years.     The patient takes aspirin 81 mg daily, Plavix, Lipitor, Coreg, Lasix as needed, Entresto twice daily, and spironolactone.     The patient reports a past surgical history of 17 surgeries.           Review of Systems   Constitutional: Positive for malaise/fatigue. Negative for diaphoresis and fever.   HENT:  Negative for congestion.    Eyes:  Negative for vision loss in left eye and vision loss in right eye.   Cardiovascular:  Positive for dyspnea on exertion. Negative for chest pain, claudication, irregular heartbeat, leg swelling, orthopnea, palpitations and syncope.        Positive for \"twinges\" of chest pain.   Respiratory:  Negative for cough, shortness of breath and wheezing.    Hematologic/Lymphatic: Negative for adenopathy.   Skin:  Negative for rash.   Musculoskeletal:  Negative for joint pain and joint swelling.   Gastrointestinal:  Negative for abdominal pain, diarrhea, nausea " and vomiting.   Neurological:  Negative for excessive daytime sleepiness, dizziness, focal weakness, light-headedness, numbness and weakness.   Psychiatric/Behavioral:  Negative for depression. The patient does not have insomnia.             Current Outpatient Medications:     aspirin 81 MG EC tablet, Take 1 tablet by mouth Daily., Disp: 30 tablet, Rfl: 2    atorvastatin (LIPITOR) 40 MG tablet, Take 1 tablet by mouth Every Night for 30 days., Disp: 30 tablet, Rfl: 0    carvedilol (COREG) 6.25 MG tablet, Take 1 tablet by mouth 2 (Two) Times a Day With Meals for 30 days., Disp: 60 tablet, Rfl: 0    clopidogrel (PLAVIX) 75 MG tablet, Take 1 tablet by mouth Daily., Disp: 30 tablet, Rfl: 2    FLUoxetine (PROzac) 20 MG capsule, Take 2 capsules by mouth Daily., Disp: , Rfl:     furosemide (LASIX) 40 MG tablet, Take 1 tablet by mouth Daily As Needed., Disp: , Rfl:     gabapentin (NEURONTIN) 300 MG capsule, Take 1 capsule by mouth 3 (Three) Times a Day., Disp: , Rfl:     HYDROcodone-acetaminophen (NORCO)  MG per tablet, Take 1 tablet by mouth Every 6 (Six) Hours As Needed for Moderate Pain., Disp: , Rfl:     ondansetron (ZOFRAN) 4 MG tablet, Take 1 tablet by mouth Every 8 (Eight) Hours As Needed for Nausea or Vomiting., Disp: , Rfl:     pantoprazole (PROTONIX) 40 MG EC tablet, Take 1 tablet by mouth 2 (Two) Times a Day., Disp: , Rfl:     sacubitril-valsartan (ENTRESTO) 24-26 MG tablet, Take 1 tablet by mouth 2 (Two) Times a Day for 180 days., Disp: 60 tablet, Rfl: 5    spironolactone (ALDACTONE) 25 MG tablet, Take 1 tablet by mouth Daily., Disp: 30 tablet, Rfl: 0    topiramate (TOPAMAX) 50 MG tablet, Take 1 tablet by mouth 2 (Two) Times a Day., Disp: , Rfl:     zolpidem (AMBIEN) 10 MG tablet, Take 1 tablet by mouth At Night As Needed for Sleep., Disp: , Rfl:     ranolazine (Ranexa) 500 MG 12 hr tablet, Take 1 tablet by mouth 2 (Two) Times a Day., Disp: 60 tablet, Rfl: 5           Objective:      Objective           Vitals:     08/10/23 1430   BP: 120/82   Pulse: 74   SpO2: 98%      Vitals and nursing note reviewed.   Constitutional:       Appearance: Normal and healthy appearance. Well-developed and not in distress.   Eyes:      Extraocular Movements: Extraocular movements intact.      Pupils: Pupils are equal, round, and reactive to light.   HENT:      Head: Normocephalic and atraumatic.    Mouth/Throat:      Pharynx: Oropharynx is clear.   Neck:      Vascular: JVD normal.      Trachea: Trachea normal.   Pulmonary:      Effort: Pulmonary effort is normal.      Breath sounds: Normal breath sounds. No wheezing. No rhonchi. No rales.   Cardiovascular:      PMI at left midclavicular line. Normal rate. Regular rhythm. Normal S1. Normal S2.       Murmurs: There is a grade 2/6 systolic murmur.      No gallop.  No click. No rub.   Pulses:     Dorsalis pedis: 2+ bilaterally.     Posterior tibial: 2+ bilaterally.  Abdominal:      General: Bowel sounds are normal.      Palpations: Abdomen is soft.      Tenderness: There is no abdominal tenderness.   Musculoskeletal: Normal range of motion.      Cervical back: Normal range of motion and neck supple. Skin:     General: Skin is warm and dry.      Capillary Refill: Capillary refill takes less than 2 seconds.   Feet:      Right foot:      Skin integrity: Skin integrity normal.      Left foot:      Skin integrity: Skin integrity normal.   Neurological:      Mental Status: Alert and oriented to person, place and time.      Cranial Nerves: Cranial nerves are intact.      Sensory: Sensation is intact.      Motor: Motor function is intact.      Coordination: Coordination is intact.   Psychiatric:         Speech: Speech normal.         Behavior: Behavior is cooperative.         Lab Review:         Procedures        Assessment/Plan:      Problem List Items Addressed This Visit                  Cardiac and Vasculature     Chest pain due to myocardial ischemia     Coronary artery disease of native  artery of native heart with stable angina pectoris - Primary     Relevant Medications     ranolazine (Ranexa) 500 MG 12 hr tablet     Other Relevant Orders     Case Request Cath Lab: Percutaneous Coronary Intervention (Completed)          Pulmonary and Pneumonias     COPD (chronic obstructive pulmonary disease)          Tobacco     Tobacco dependence      1. History of myocardial infarction, status post stenting  2. Fatigue  3. Dyspnea on exertion     Plan  Outpatient percutaneous coronary intervention of her left anterior descending artery is scheduled for 08/16/2023. Ranexa twice daily is being started. She will continue her current medications as well.      Recommendations/plans:     Transcribed from ambient dictation for Osman Patrick DO by Genoveva Snell.  08/10/23   16:09 CDT     Patient or patient representative verbalized consent to the visit recording.  I have personally performed the services described in this document as transcribed by the above individual, and it is both accurate and complete.  Osman Patrick DO  8/15/2023  10:02 CDT

## 2023-08-17 NOTE — Clinical Note
First balloon inflation max pressure = 6 tere. First balloon inflation duration = 15 seconds. Second inflation of balloon - Max pressure = 10 tere. 2nd Inflation of balloon - Duration = 15 seconds.

## 2023-08-18 VITALS
HEART RATE: 59 BPM | DIASTOLIC BLOOD PRESSURE: 78 MMHG | BODY MASS INDEX: 29.84 KG/M2 | WEIGHT: 152 LBS | OXYGEN SATURATION: 99 % | RESPIRATION RATE: 18 BRPM | HEIGHT: 60 IN | TEMPERATURE: 97.6 F | SYSTOLIC BLOOD PRESSURE: 129 MMHG

## 2023-08-18 PROBLEM — I25.118 CORONARY ARTERY DISEASE WITH STABLE ANGINA PECTORIS: Status: ACTIVE | Noted: 2023-08-18

## 2023-08-18 LAB
ANION GAP SERPL CALCULATED.3IONS-SCNC: 8 MMOL/L (ref 5–15)
BUN SERPL-MCNC: 7 MG/DL (ref 6–20)
BUN/CREAT SERPL: 9.1 (ref 7–25)
CALCIUM SPEC-SCNC: 8.4 MG/DL (ref 8.6–10.5)
CHLORIDE SERPL-SCNC: 108 MMOL/L (ref 98–107)
CO2 SERPL-SCNC: 25 MMOL/L (ref 22–29)
CREAT SERPL-MCNC: 0.77 MG/DL (ref 0.57–1)
DEPRECATED RDW RBC AUTO: 49.5 FL (ref 37–54)
EGFRCR SERPLBLD CKD-EPI 2021: 91.2 ML/MIN/1.73
ERYTHROCYTE [DISTWIDTH] IN BLOOD BY AUTOMATED COUNT: 14.3 % (ref 12.3–15.4)
GLUCOSE SERPL-MCNC: 102 MG/DL (ref 65–99)
HCT VFR BLD AUTO: 37.9 % (ref 34–46.6)
HGB BLD-MCNC: 11.4 G/DL (ref 12–15.9)
MCH RBC QN AUTO: 28.6 PG (ref 26.6–33)
MCHC RBC AUTO-ENTMCNC: 30.1 G/DL (ref 31.5–35.7)
MCV RBC AUTO: 95.2 FL (ref 79–97)
PLATELET # BLD AUTO: 273 10*3/MM3 (ref 140–450)
PMV BLD AUTO: 10.6 FL (ref 6–12)
POTASSIUM SERPL-SCNC: 4.2 MMOL/L (ref 3.5–5.2)
RBC # BLD AUTO: 3.98 10*6/MM3 (ref 3.77–5.28)
SODIUM SERPL-SCNC: 141 MMOL/L (ref 136–145)
WBC NRBC COR # BLD: 8.35 10*3/MM3 (ref 3.4–10.8)

## 2023-08-18 PROCEDURE — 93005 ELECTROCARDIOGRAM TRACING: CPT | Performed by: EMERGENCY MEDICINE

## 2023-08-18 PROCEDURE — 93010 ELECTROCARDIOGRAM REPORT: CPT | Performed by: INTERNAL MEDICINE

## 2023-08-18 PROCEDURE — 80048 BASIC METABOLIC PNL TOTAL CA: CPT | Performed by: EMERGENCY MEDICINE

## 2023-08-18 PROCEDURE — 85027 COMPLETE CBC AUTOMATED: CPT | Performed by: EMERGENCY MEDICINE

## 2023-08-18 RX ORDER — ISOSORBIDE MONONITRATE 30 MG/1
30 TABLET, EXTENDED RELEASE ORAL DAILY
Qty: 30 TABLET | Refills: 11 | Status: SHIPPED | OUTPATIENT
Start: 2023-08-18

## 2023-08-18 RX ADMIN — HYDROCODONE BITARTRATE AND ACETAMINOPHEN 1 TABLET: 10; 325 TABLET ORAL at 13:20

## 2023-08-18 RX ADMIN — CARVEDILOL 6.25 MG: 6.25 TABLET, FILM COATED ORAL at 09:05

## 2023-08-18 RX ADMIN — FLUOXETINE HYDROCHLORIDE 40 MG: 20 CAPSULE ORAL at 09:05

## 2023-08-18 RX ADMIN — GABAPENTIN 300 MG: 300 CAPSULE ORAL at 09:11

## 2023-08-18 RX ADMIN — CLOPIDOGREL BISULFATE 75 MG: 75 TABLET, FILM COATED ORAL at 09:06

## 2023-08-18 RX ADMIN — PANTOPRAZOLE SODIUM 40 MG: 40 TABLET, DELAYED RELEASE ORAL at 09:05

## 2023-08-18 RX ADMIN — SACUBITRIL AND VALSARTAN 1 TABLET: 24; 26 TABLET, FILM COATED ORAL at 09:06

## 2023-08-18 RX ADMIN — HYDROCODONE BITARTRATE AND ACETAMINOPHEN 1 TABLET: 10; 325 TABLET ORAL at 04:21

## 2023-08-18 RX ADMIN — ASPIRIN 81 MG: 81 TABLET, COATED ORAL at 09:05

## 2023-08-18 RX ADMIN — RANOLAZINE 500 MG: 500 TABLET, FILM COATED, EXTENDED RELEASE ORAL at 09:06

## 2023-08-18 RX ADMIN — SPIRONOLACTONE 25 MG: 25 TABLET ORAL at 09:05

## 2023-08-18 RX ADMIN — TOPIRAMATE 50 MG: 25 TABLET, FILM COATED ORAL at 09:05

## 2023-08-18 RX ADMIN — SODIUM CHLORIDE 100 ML/HR: 9 INJECTION, SOLUTION INTRAVENOUS at 02:23

## 2023-08-18 RX ADMIN — Medication 10 ML: at 09:06

## 2023-08-18 NOTE — PLAN OF CARE
Goal Outcome Evaluation:             Problem: Adult Inpatient Plan of Care  Goal: Plan of Care Review  Outcome: Ongoing, Progressing  Flowsheets (Taken 8/18/2023 0445)  Outcome Evaluation: PRN pain meds given per MAR with some relief. Pt was found wondering on first floor, stating she had been to her car. Pt had cigaretts and a lighter with her. Pt was returned to [place] [id number] and educated on wondering policy, smoking policy, and lack of tele monitoring when off of 4B. SB/S 57-65. Pt is anxious to D/C. Will update MD as needed.

## 2023-08-18 NOTE — OUTREACH NOTE
[facility] Week 2 Survey      Flowsheet Row Responses   [facility] facility patient discharged from? [facility]   Does the patient have one of the following disease processes/diagnoses(primary or secondary)? Acute MI (STEMI,NSTEMI)   Revoke Readmitted            ISABEL SALGUERO - Registered Nurse

## 2023-08-18 NOTE — DISCHARGE SUMMARY
[facility] HEART GROUP DISCHARGE    Date of Discharge: August 18, 2023    Discharge Diagnosis: Coronary artery disease    Presenting Problem/History of Present Illness  Coronary artery disease of native artery of native heart with stable angina pectoris [I25.118]  CAD (coronary artery disease) [I25.10]  Coronary artery disease with stable angina pectoris [I25.118]        Hospital Course  Patient is a 58 y.o. female presented .  As an outpatient to undergo left heart catheterization.  Please see dedicated procedure note for specific details.  She underwent stent placement to her mid LAD.  She was monitored overnight without any complications noted.  She was discharged home the following morning in stable condition.  Please see medications as outlined below.  She will be on dual antiplatelet therapy and high intensity statin.  She will follow-up in the office within the next 30 days.    Procedures Performed  Procedure(s):  Percutaneous Coronary Intervention         Condition on Discharge: Stable    Physical Exam at Discharge    Vital Signs  Temp:  [98.2 øF (36.8 øC)] 98.2 øF (36.8 øC)  Heart Rate:  [56-72] 68  Resp:  [18] 18  BP: (113-122)/() 122/110    Physical Exam:  Vitals and nursing note reviewed.   Constitutional:       Appearance: Normal and healthy appearance. Well-developed and not in distress.   Eyes:      Extraocular Movements: Extraocular movements intact.      Pupils: Pupils are equal, round, and reactive to light.   HENT:      Head: Normocephalic and atraumatic.    Mouth/Throat:      Pharynx: Oropharynx is clear.   Neck:      Vascular: JVD normal.      Trachea: Trachea normal.   Pulmonary:      Effort: Pulmonary effort is normal.      Breath sounds: Normal breath sounds. No wheezing. No rhonchi. No rales.   Cardiovascular:      PMI at left midclavicular line. Normal rate. Regular rhythm. Normal S1. Normal S2.       Murmurs: There is a grade 2/6 systolic murmur.      No gallop.  No  click. No rub.   Pulses:     Dorsalis pedis: 2+ bilaterally.     Posterior tibial: 2+ bilaterally.  Abdominal:      General: Bowel sounds are normal.      Palpations: Abdomen is soft.      Tenderness: There is no abdominal tenderness.   Musculoskeletal: Normal range of motion.      Cervical back: Normal range of motion and neck supple. Skin:     General: Skin is warm and dry.      Capillary Refill: Capillary refill takes less than 2 seconds.   Feet:      Right foot:      Skin integrity: Skin integrity normal.      Left foot:      Skin integrity: Skin integrity normal.   Neurological:      Mental Status: Alert and oriented to person, place and time.      Cranial Nerves: Cranial nerves are intact.      Sensory: Sensation is intact.      Motor: Motor function is intact.      Coordination: Coordination is intact.   Psychiatric:         Speech: Speech normal.         Behavior: Behavior is cooperative.       Discharge Disposition  Home or Self Care    Discharge Medications     Discharge Medications        New Medications        Instructions Start Date   isosorbide mononitrate 30 MG 24 hr tablet  Commonly known as: IMDUR   30 mg, Oral, Daily             Continue These Medications        Instructions Start Date   aspirin 81 MG EC tablet   81 mg, Oral, Daily      clopidogrel 75 MG tablet  Commonly known as: PLAVIX   75 mg, Oral, Daily      FLUoxetine 20 MG capsule  Commonly known as: PROzac   40 mg, Oral, Daily      furosemide 40 MG tablet  Commonly known as: LASIX   40 mg, Oral, Daily PRN      gabapentin 600 MG tablet  Commonly known as: NEURONTIN   600 mg, Oral, 3 Times Daily      HYDROcodone-acetaminophen  MG per tablet  Commonly known as: NORCO   1 tablet, Oral, Every 8 Hours PRN      meloxicam 15 MG tablet  Commonly known as: MOBIC   15 mg, Oral, Daily      pantoprazole 40 MG EC tablet  Commonly known as: PROTONIX   40 mg, Oral, 2 Times Daily      ranolazine 500 MG 12 hr tablet  Commonly known as: Ranexa   500 mg,  Oral, 2 Times Daily      SUMAtriptan 50 MG tablet  Commonly known as: IMITREX   50 mg, Oral, Every 2 Hours PRN, Take one tablet at onset of headache. May repeat dose one time in 2 hours if headache not relieved.      tiZANidine 4 MG tablet  Commonly known as: ZANAFLEX   4 mg, Oral, Every 8 Hours PRN      topiramate 50 MG tablet  Commonly known as: TOPAMAX   50 mg, Oral, Daily      zolpidem 10 MG tablet  Commonly known as: AMBIEN   10 mg, Oral, Nightly PRN               Discharge Diet: Heart healthy    Activity at Discharge: As tolerated    Follow-up Appointments  Future Appointments   Date Time Provider Department Center   9/27/2023  8:30 AM Osman Patrick DO [facility] CD P[facility] PAD         Test Results Pending at Discharge       Osman Patrick DO  Interventional cardiology  [facility]  August 18, 2023  18:42 CDT

## 2023-08-18 NOTE — PLAN OF CARE
Goal Outcome Evaluation:                      Right groin site mildly bruised, but otherwise WNL. Patient awaiting daughter to get off work to come pick her up, will be around 1600 when she is able to pick her up. Care plan problems resolved.Patient will need work excuse to return back to work.

## 2023-08-20 LAB
QT INTERVAL: 434 MS
QTC INTERVAL: 447 MS

## 2023-08-21 ENCOUNTER — TELEPHONE (OUTPATIENT)
Dept: CARDIOLOGY | Facility: CLINIC | Age: 58
End: 2023-08-21

## 2023-08-21 NOTE — TELEPHONE ENCOUNTER
Caller: Alannah Barr    Relationship: Self    Best call back number: [phone]    What form or medical record are you requesting: [employer] FORM AND OTHER PAPERWORK    Who is requesting this form or medical record from you: PATIENT    How would you like to receive the form or medical records (pick-up, mail, fax):   If pick-up, provide patient with address and location details    Timeframe paperwork needed: WITHIN THIS WEEK    Additional notes: PATIENT WOULD LIKE TO HAVE HER DAUGHTER TO  COMPLETED UB04 FORM ALONG WITH OTHER PAPERWORK THIS WEEK. PLEASE CALL PATIENT REGARDING THE STATUS OF PAPERWORK.

## 2023-08-21 NOTE — TELEPHONE ENCOUNTER
CALLED PT TO LET HER KNOW THE PAPERWORK SHE DROPPED OFF FOR US TO FILL OUT WAS FOR THE EMPLOYEE, NO PHYSICIANS PART ON EITHER FORM.  PT ASK ME TO SHREDDED FOR HER SHE HAD PLENTY OF COPIES

## 2023-08-22 RX ORDER — ATORVASTATIN CALCIUM 40 MG/1
TABLET, FILM COATED ORAL
Qty: 30 TABLET | Refills: 0 | OUTPATIENT
Start: 2023-08-22

## 2023-08-22 RX ORDER — SACUBITRIL AND VALSARTAN 24; 26 MG/1; MG/1
TABLET, FILM COATED ORAL
Qty: 60 TABLET | Refills: 0 | OUTPATIENT
Start: 2023-08-22

## 2023-08-22 RX ORDER — CARVEDILOL 6.25 MG/1
TABLET ORAL
Qty: 60 TABLET | Refills: 0 | OUTPATIENT
Start: 2023-08-22

## 2023-08-22 RX ORDER — ATORVASTATIN CALCIUM 40 MG/1
40 TABLET, FILM COATED ORAL NIGHTLY
Qty: 30 TABLET | Refills: 2 | Status: SHIPPED | OUTPATIENT
Start: 2023-08-22

## 2023-08-22 RX ORDER — SPIRONOLACTONE 25 MG/1
TABLET ORAL
Qty: 30 TABLET | Refills: 0 | OUTPATIENT
Start: 2023-08-22

## 2023-08-22 RX ORDER — SPIRONOLACTONE 25 MG/1
25 TABLET ORAL DAILY
Qty: 30 TABLET | Refills: 2 | Status: SHIPPED | OUTPATIENT
Start: 2023-08-22

## 2023-08-22 RX ORDER — CARVEDILOL 6.25 MG/1
6.25 TABLET ORAL 2 TIMES DAILY WITH MEALS
Qty: 60 TABLET | Refills: 1 | Status: SHIPPED | OUTPATIENT
Start: 2023-08-22

## 2023-08-23 ENCOUNTER — APPOINTMENT (OUTPATIENT)
Dept: ULTRASOUND IMAGING | Facility: HOSPITAL | Age: 58
End: 2023-08-23
Payer: COMMERCIAL

## 2023-08-23 ENCOUNTER — APPOINTMENT (OUTPATIENT)
Dept: CT IMAGING | Facility: HOSPITAL | Age: 58
End: 2023-08-23
Payer: COMMERCIAL

## 2023-08-23 ENCOUNTER — HOSPITAL ENCOUNTER (EMERGENCY)
Facility: HOSPITAL | Age: 58
Discharge: HOME | End: 2023-08-23
Attending: STUDENT IN AN ORGANIZED HEALTH CARE EDUCATION/TRAINING PROGRAM
Payer: COMMERCIAL

## 2023-08-23 ENCOUNTER — APPOINTMENT (OUTPATIENT)
Dept: GENERAL RADIOLOGY | Facility: HOSPITAL | Age: 58
End: 2023-08-23
Payer: COMMERCIAL

## 2023-08-23 VITALS
OXYGEN SATURATION: 97 % | SYSTOLIC BLOOD PRESSURE: 108 MMHG | TEMPERATURE: 98.2 F | HEIGHT: 60 IN | DIASTOLIC BLOOD PRESSURE: 75 MMHG | RESPIRATION RATE: 18 BRPM | WEIGHT: 155 LBS | BODY MASS INDEX: 30.43 KG/M2 | HEART RATE: 72 BPM
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
## 2023-08-23 DIAGNOSIS — M54.50 ACUTE LOW BACK PAIN, UNSPECIFIED BACK PAIN LATERALITY, UNSPECIFIED WHETHER SCIATICA PRESENT: ICD-10-CM

## 2023-08-23 LAB
ANION GAP SERPL CALCULATED.3IONS-SCNC: 16 MMOL/L (ref 5–15)
BACTERIA UR QL AUTO: ABNORMAL /HPF
BASOPHILS # BLD AUTO: 0.03 10*3/MM3 (ref 0–0.2)
BASOPHILS NFR BLD AUTO: 0.2 % (ref 0–1.5)
BILIRUB UR QL STRIP: NEGATIVE
BUN SERPL-MCNC: 18 MG/DL (ref 6–20)
BUN/CREAT SERPL: 12.7 (ref 7–25)
CALCIUM SPEC-SCNC: 9.1 MG/DL (ref 8.6–10.5)
CHLORIDE SERPL-SCNC: 102 MMOL/L (ref 98–107)
CLARITY UR: CLEAR
CO2 SERPL-SCNC: 17 MMOL/L (ref 22–29)
COLOR UR: YELLOW
CREAT SERPL-MCNC: 1.42 MG/DL (ref 0.57–1)
DEPRECATED RDW RBC AUTO: 49.9 FL (ref 37–54)
EGFRCR SERPLBLD CKD-EPI 2021: 43.5 ML/MIN/1.73
EOSINOPHIL # BLD AUTO: 0.17 10*3/MM3 (ref 0–0.4)
EOSINOPHIL NFR BLD AUTO: 1.4 % (ref 0.3–6.2)
ERYTHROCYTE [DISTWIDTH] IN BLOOD BY AUTOMATED COUNT: 14.8 % (ref 12.3–15.4)
GLUCOSE SERPL-MCNC: 131 MG/DL (ref 65–99)
GLUCOSE UR STRIP-MCNC: NEGATIVE MG/DL
HCT VFR BLD AUTO: 37.9 % (ref 34–46.6)
HGB BLD-MCNC: 11.8 G/DL (ref 12–15.9)
HGB UR QL STRIP.AUTO: NEGATIVE
HYALINE CASTS UR QL AUTO: ABNORMAL /LPF
IMM GRANULOCYTES # BLD AUTO: 0.04 10*3/MM3 (ref 0–0.05)
IMM GRANULOCYTES NFR BLD AUTO: 0.3 % (ref 0–0.5)
KETONES UR QL STRIP: NEGATIVE
LEUKOCYTE ESTERASE UR QL STRIP.AUTO: ABNORMAL
LYMPHOCYTES # BLD AUTO: 3.38 10*3/MM3 (ref 0.7–3.1)
LYMPHOCYTES NFR BLD AUTO: 27.9 % (ref 19.6–45.3)
MCH RBC QN AUTO: 28.7 PG (ref 26.6–33)
MCHC RBC AUTO-ENTMCNC: 31.1 G/DL (ref 31.5–35.7)
MCV RBC AUTO: 92.2 FL (ref 79–97)
MONOCYTES # BLD AUTO: 1.08 10*3/MM3 (ref 0.1–0.9)
MONOCYTES NFR BLD AUTO: 8.9 % (ref 5–12)
NEUTROPHILS NFR BLD AUTO: 61.3 % (ref 42.7–76)
NEUTROPHILS NFR BLD AUTO: 7.41 10*3/MM3 (ref 1.7–7)
NITRITE UR QL STRIP: NEGATIVE
NRBC BLD AUTO-RTO: 0 /100 WBC (ref 0–0.2)
PH UR STRIP.AUTO: 5.5 [PH] (ref 5–8)
PLATELET # BLD AUTO: 274 10*3/MM3 (ref 140–450)
PMV BLD AUTO: 10.9 FL (ref 6–12)
POTASSIUM SERPL-SCNC: 3.6 MMOL/L (ref 3.5–5.2)
PROT UR QL STRIP: NEGATIVE
RBC # BLD AUTO: 4.11 10*6/MM3 (ref 3.77–5.28)
RBC # UR STRIP: ABNORMAL /HPF
REF LAB TEST METHOD: ABNORMAL
SODIUM SERPL-SCNC: 135 MMOL/L (ref 136–145)
SP GR UR STRIP: 1.01 (ref 1–1.03)
SQUAMOUS #/AREA URNS HPF: ABNORMAL /HPF
UROBILINOGEN UR QL STRIP: ABNORMAL
WBC # UR STRIP: ABNORMAL /HPF
WBC NRBC COR # BLD: 12.11 10*3/MM3 (ref 3.4–10.8)

## 2023-08-23 PROCEDURE — 70450 CT HEAD/BRAIN W/O DYE: CPT

## 2023-08-23 PROCEDURE — 36415 COLL VENOUS BLD VENIPUNCTURE: CPT

## 2023-08-23 PROCEDURE — 93926 LOWER EXTREMITY STUDY: CPT | Performed by: SURGERY

## 2023-08-23 PROCEDURE — 99284 EMERGENCY DEPT VISIT MOD MDM: CPT

## 2023-08-23 PROCEDURE — 80048 BASIC METABOLIC PNL TOTAL CA: CPT | Performed by: STUDENT IN AN ORGANIZED HEALTH CARE EDUCATION/TRAINING PROGRAM

## 2023-08-23 PROCEDURE — 93926 LOWER EXTREMITY STUDY: CPT

## 2023-08-23 PROCEDURE — 85025 COMPLETE CBC W/AUTO DIFF WBC: CPT | Performed by: STUDENT IN AN ORGANIZED HEALTH CARE EDUCATION/TRAINING PROGRAM

## 2023-08-23 PROCEDURE — 73562 X-RAY EXAM OF KNEE 3: CPT

## 2023-08-23 PROCEDURE — 81001 URINALYSIS AUTO W/SCOPE: CPT | Performed by: STUDENT IN AN ORGANIZED HEALTH CARE EDUCATION/TRAINING PROGRAM

## 2023-08-23 PROCEDURE — 72131 CT LUMBAR SPINE W/O DYE: CPT

## 2023-08-23 RX ORDER — ACETAMINOPHEN 500 MG
1000 TABLET ORAL ONCE
Status: COMPLETED | OUTPATIENT
Start: 2023-08-23 | End: 2023-08-23

## 2023-08-23 RX ORDER — CYCLOBENZAPRINE HCL 10 MG
10 TABLET ORAL ONCE
Status: COMPLETED | OUTPATIENT
Start: 2023-08-23 | End: 2023-08-23

## 2023-08-23 RX ADMIN — SODIUM CHLORIDE, POTASSIUM CHLORIDE, SODIUM LACTATE AND CALCIUM CHLORIDE 1000 ML: 600; 310; 30; 20 INJECTION, SOLUTION INTRAVENOUS at 16:32

## 2023-08-23 RX ADMIN — ACETAMINOPHEN 1000 MG: 500 TABLET, FILM COATED ORAL at 14:13

## 2023-08-23 RX ADMIN — CYCLOBENZAPRINE 10 MG: 10 TABLET, FILM COATED ORAL at 14:11

## 2023-08-23 NOTE — DISCHARGE INSTRUCTIONS
It was very nice to meet you, Alannah. Thank you for allowing us to take care of you today at [facility].    Your evaluation today did not show any emergent findings or have any emergent indications for admission to the hospital.     Please understand that an ER evaluation is just the start of your evaluation. We do the best we can, but we are often unable to fully figure out what is causing your symptoms from one evaluation. Thus, our goal is to determine whether you need to be evaluated in the hospital or if it is safe for you to go home and see other doctors such as a primary care physician or a specialist on an outpatient basis.     Like we discussed, it is very important that you follow up with your primary care doctor (call them to set up an appointment) within the next few days or as soon as possible so that you can be re-evaluated for improvement in your symptoms or for any other questions.  Please follow-up with your primary care provider for repeat blood work to further evaluate kidney function.    A copy of your results should be included in your paperwork.     Please return to the emergency room within 12-48 hours if you experience fever, chills, chest pain or shortness of breath, pain with inspiration/expiration, pain that travels to your arms, neck or back, nausea, vomiting, severe headache, tearing pain in your chest, dizziness, feel as though you are about to pass out, have any worsening symptoms, or any other concerns.

## 2023-08-23 NOTE — ED PROVIDER NOTES
Subjective   History of Present Illness  Patient states that she woke up this morning after going to bed around 1 AM.  States that when she woke up she took a step in her right knee buckled and she fell down.  States that she then began having some lower back pain.  States that she never had any right upper extremity weakness or tingling or any facial droop or any facial tingling.  States that she is currently not having any chest pain.  States that she is not having any saddle paresthesias or any urinary retention or urinary incontinence or any constipation or any lower extremity weakness.  States that her biggest complaint is tingling in her right leg that she feels like is related to a strong itch that she gets relief from when she scratches it.    Review of Systems   All other systems reviewed and are negative.    Past Medical History:   Diagnosis Date    CHF (congestive heart failure)     Chronic pain syndrome     COPD (chronic obstructive pulmonary disease)     Tobacco dependence        Allergies   Allergen Reactions    Levaquin [Levofloxacin] Shortness Of Breath    Morphine Shortness Of Breath    Codeine Unknown - High Severity    Doxycycline Unknown - High Severity    Metronidazole Unknown - High Severity    Naproxen Unknown - High Severity    Propoxyphene Unknown - High Severity    Tetanus Toxoids Unknown - High Severity    Tetracyclines & Related Unknown - High Severity       Past Surgical History:   Procedure Laterality Date    CARDIAC CATHETERIZATION N/A 07/29/2023    Procedure: Left Heart Cath;  Surgeon: Osman Patrick DO;  Location:  [facility];  Service: Cardiovascular;  Laterality: N/A;    CARDIAC CATHETERIZATION N/A 8/17/2023    Procedure: Percutaneous Coronary Intervention;  Surgeon: Osman Patrick DO;  Location:  [facility];  Service: Cardiovascular;  Laterality: N/A;    CHOLECYSTECTOMY      CORONARY STENT PLACEMENT  2023    X 2     HYSTERECTOMY      KNEE ARTHROSCOPY W/ MEDIAL COLLATERAL LIGAMENT (MCL) REPAIR Right     x 2    TONSILLECTOMY         Family History   Problem Relation Age of Onset    COPD Mother     Diabetes Father     Heart failure Father        Social History     Socioeconomic History    Marital status: Single   Tobacco Use    Smoking status: Every Day     Packs/day: 1.00     Years: 51.00     Pack years: 51.00     Types: Cigarettes    Smokeless tobacco: Never   Vaping Use    Vaping Use: Never used   Substance and Sexual Activity    Alcohol use: Not Currently    Drug use: Not Currently    Sexual activity: Defer           Objective   Physical Exam  Vitals and nursing note reviewed.   Constitutional:       General: She is not in acute distress.     Appearance: Normal appearance. She is not toxic-appearing or diaphoretic.   HENT:      Head: Normocephalic and atraumatic.      Right Ear: External ear normal.      Left Ear: External ear normal.      Nose: Nose normal.      Mouth/Throat:      Mouth: Mucous membranes are moist.   Eyes:      General:         Right eye: No discharge.         Left eye: No discharge.      Extraocular Movements: Extraocular movements intact.      Conjunctiva/sclera: Conjunctivae normal.   Cardiovascular:      Rate and Rhythm: Normal rate.      Pulses: Normal pulses.   Pulmonary:      Effort: Pulmonary effort is normal. No respiratory distress.      Breath sounds: No rhonchi.   Abdominal:      General: Abdomen is flat.      Tenderness: There is no abdominal tenderness. There is no guarding or rebound.   Musculoskeletal:         General: No deformity or signs of injury.   Skin:     General: Skin is warm.      Capillary Refill: Capillary refill takes less than 2 seconds.      Coloration: Skin is not jaundiced.      Findings: Bruising (and hematoma over right groin) and lesion (numerous scratches and excoriations over upper extremities and lower extremities) present.   Neurological:      Mental Status: She is  alert and oriented to person, place, and time. Mental status is at baseline.      Sensory: Sensory deficit (Slight decrease in light touch sensation to right anterior thigh and right lateral thigh and right posterior knee.. No weakness present. No facial droop or RUE or LUE weakness.) present.      Motor: No weakness.   Psychiatric:         Mood and Affect: Mood normal.         Behavior: Behavior normal.         Thought Content: Thought content normal.         Judgment: Judgment normal.     Labs Reviewed   BASIC METABOLIC PANEL - Abnormal; Notable for the following components:       Result Value    Glucose 131 (*)     Creatinine 1.42 (*)     Sodium 135 (*)     CO2 17.0 (*)     Anion Gap 16.0 (*)     eGFR 43.5 (*)     All other components within normal limits    Narrative:     GFR Normal >60  Chronic Kidney Disease <60  Kidney Failure <15     URINALYSIS W/ MICROSCOPIC IF INDICATED (NO CULTURE) - Abnormal; Notable for the following components:    Leuk Esterase, UA Trace (*)     All other components within normal limits   CBC WITH AUTO DIFFERENTIAL - Abnormal; Notable for the following components:    WBC 12.11 (*)     Hemoglobin 11.8 (*)     MCHC 31.1 (*)     Neutrophils, Absolute 7.41 (*)     Lymphocytes, Absolute 3.38 (*)     Monocytes, Absolute 1.08 (*)     All other components within normal limits   URINALYSIS, MICROSCOPIC ONLY - Abnormal; Notable for the following components:    RBC, UA 0-2 (*)     WBC, UA 3-5 (*)     Bacteria, UA 1+ (*)     Squamous Epithelial Cells, UA 3-6 (*)     All other components within normal limits   CBC AND DIFFERENTIAL    Narrative:     The following orders were created for panel order CBC & Differential.  Procedure                               Abnormality         Status                     ---------                               -----------         ------                     CBC Auto Differential[[id number]]        Abnormal            Final result                 Please view results  for these tests on the individual orders.      US Arterial Doppler Lower Extremity Right   Final Result   Patent right lower extremity arterial system without   evidence of significant stenosis. There is no evidence of pseudoaneurysm   formation or hematoma.       This report was finalized on 08/24/2023 12:26 by Dr. Jose Miguel Miles MD.      XR Knee 3 View Right   Final Result   1. No acute osseous injury. Borderline medial joint space narrowing.   This report was finalized on 08/23/2023 15:47 by Dr. Dayana Nielsen MD.      CT Head Without Contrast   Final Result   1. No acute intracranial findings.    This report was finalized on 08/23/2023 14:54 by Dr Shirley Cho MD.      CT Lumbar Spine Without Contrast   Final Result   1. No acute fracture or malalignment.   2. Mild degenerative changes as above.   3. Calcified aortoiliac atherosclerosis.   This report was finalized on 08/23/2023 15:02 by Dr Shirley Cho MD.           Procedures           ED Course  ED Course as of 08/24/23 1338   Wed Aug 23, 2023   1712 Pending CT scan and US to evaluate for any pseudoaneurysm, patient was signed out to YENNI Saldaña. [NP]      ED Course User Index  [NP] Lizeth Victoria MD                                           Medical Decision Making  Alannah Barr is a very pleasant 58 y.o. female who presents to the ED for right leg numbness and pain after a fall.     Patient was non-toxic and not-ill appearing on arrival.     Vital signs stable on arrival.     Patient's presentation raises suspicion for differentials including, but not limited to, meralgia paresthetica, fracture, disc herniation, ICH, pseudoaneurysm.     External (non-ED) record review: none    Given this, Alannah was placed on the monitor. Laboratory studies and imaging studies were ordered including cbc, bmp, CT head, CT lumbar spine, US RLE.     Alannah was given fluids as her Cr was elevated.    On re-evaluation, patient remained  hemodynamically stable and appeared to be comfortable and in no acute distress.    Given findings described above, patient's presentation is most likely related to likely nerve compression from hematoma and swelling at recent cath site .     At this point, after reviewing the workup, I have a low suspicion for cauda equina, stroke, or fracture.      Care of this patient was taken over from attending physician, Dr. Victoria pending vascular studies and fluid administration.    Medications administered during ED encounter include the following,  acetaminophen (TYLENOL) tablet 1,000 mg (1,000 mg Oral Given 8/23/23 1413)  cyclobenzaprine (FLEXERIL) tablet 10 mg (10 mg Oral Given 8/23/23 1411)  lactated ringers bolus 1,000 mL (0 mL Intravenous Stopped 8/23/23 1921)  On re-evaluation, patient remained hemodynamically stable.     Following administration of 1 L of fluid I had an in-depth discussion with the patient and family were present at bedside regarding discharge home.  I discussed that ED work-up is unremarkable regarding imaging results.  I did discuss that it appears as though patient's kidney function specifically her creatinine was elevated and I highly advised patient to increase her fluid intake and I would like patient to follow-up with her primary care provider this week for repeat labs to ensure patient's kidney function is not worsening.  I as well as attending physician, Dr. Victoria educated patient on red flags that she will need to be watching out for regarding back pain.  I answered all the questions regarding the emergency department evaluation, diagnosis, and treatment plan in plain and simple language that was understandable. I said that there is always some diagnostic uncertainty in the ER and went over the fact that the symptoms may change or new symptoms may reveal themselves after being discharged. Because of this, I said that it is very important that [facility] follows up, by calling as soon as  possible to set up an appointment, with the primary care doctor within the next few days or as soon as reasonably possible so that the symptoms can be re-evaluated for improvement or for any other questions. I also gave Alannah common sense return precautions and encouraged a quick return to the emergency department within 24 - 48hrs if there are any new, worsening, or concerning symptoms. The patient verbalized understanding of the discharge instructions and agreed with them. Alannah was discharged in stable condition.     Problems Addressed:  Acute low back pain, unspecified back pain laterality, unspecified whether sciatica present: complicated acute illness or injury  Fall, initial encounter: complicated acute illness or injury    Amount and/or Complexity of Data Reviewed  Labs: ordered.  Radiology: ordered.    Risk  OTC drugs.  Prescription drug management.        Final diagnoses:   Fall, initial encounter   Acute low back pain, unspecified back pain laterality, unspecified whether sciatica present       ED Disposition  ED Disposition       ED Disposition   Discharge    Condition   Stable    Comment   --               Coy Guillermo MD  [address] [place] [id number] [phone]    Schedule an appointment as soon as possible for a visit       [facility] EMERGENCY DEPARTMENT  [address] [place] [id number] [phone]    If symptoms worsen         Medication List      No changes were made to your prescriptions during this visit.            Brendan Sutherland, APRN  08/24/23 [id number]

## 2023-09-04 ENCOUNTER — HOSPITAL ENCOUNTER (EMERGENCY)
Facility: HOSPITAL | Age: 58
Discharge: HOME | End: 2023-09-04
Attending: FAMILY MEDICINE | Admitting: FAMILY MEDICINE
Payer: COMMERCIAL

## 2023-09-04 ENCOUNTER — APPOINTMENT (OUTPATIENT)
Dept: GENERAL RADIOLOGY | Facility: HOSPITAL | Age: 58
End: 2023-09-04
Payer: COMMERCIAL

## 2023-09-04 VITALS
HEART RATE: 72 BPM | TEMPERATURE: 98.7 F | OXYGEN SATURATION: 98 % | DIASTOLIC BLOOD PRESSURE: 74 MMHG | RESPIRATION RATE: 18 BRPM | SYSTOLIC BLOOD PRESSURE: 121 MMHG

## 2023-09-04 DIAGNOSIS — E83.42 HYPOMAGNESEMIA: ICD-10-CM

## 2023-09-04 DIAGNOSIS — R07.89 ATYPICAL CHEST PAIN: Primary | ICD-10-CM

## 2023-09-04 DIAGNOSIS — E87.6 HYPOKALEMIA: ICD-10-CM

## 2023-09-04 LAB
ALBUMIN SERPL-MCNC: 3.8 G/DL (ref 3.5–5.2)
ALBUMIN/GLOB SERPL: 1.4 G/DL
ALP SERPL-CCNC: 102 U/L (ref 39–117)
ALT SERPL W P-5'-P-CCNC: 9 U/L (ref 1–33)
ANION GAP SERPL CALCULATED.3IONS-SCNC: 12 MMOL/L (ref 5–15)
APTT PPP: 30.4 SECONDS (ref 24.1–35)
AST SERPL-CCNC: 12 U/L (ref 1–32)
BASOPHILS # BLD AUTO: 0.01 10*3/MM3 (ref 0–0.2)
BASOPHILS NFR BLD AUTO: 0.1 % (ref 0–1.5)
BILIRUB SERPL-MCNC: <0.2 MG/DL (ref 0–1.2)
BUN SERPL-MCNC: 9 MG/DL (ref 6–20)
BUN/CREAT SERPL: 11 (ref 7–25)
CALCIUM SPEC-SCNC: 8.7 MG/DL (ref 8.6–10.5)
CHLORIDE SERPL-SCNC: 105 MMOL/L (ref 98–107)
CO2 SERPL-SCNC: 23 MMOL/L (ref 22–29)
CREAT SERPL-MCNC: 0.82 MG/DL (ref 0.57–1)
DEPRECATED RDW RBC AUTO: 50.4 FL (ref 37–54)
EGFRCR SERPLBLD CKD-EPI 2021: 84.1 ML/MIN/1.73
EOSINOPHIL # BLD AUTO: 1.12 10*3/MM3 (ref 0–0.4)
EOSINOPHIL NFR BLD AUTO: 11.1 % (ref 0.3–6.2)
ERYTHROCYTE [DISTWIDTH] IN BLOOD BY AUTOMATED COUNT: 14.6 % (ref 12.3–15.4)
GEN 5 2HR TROPONIN T REFLEX: 23 NG/L
GLOBULIN UR ELPH-MCNC: 2.8 GM/DL
GLUCOSE SERPL-MCNC: 126 MG/DL (ref 65–99)
HCT VFR BLD AUTO: 33.4 % (ref 34–46.6)
HGB BLD-MCNC: 10.3 G/DL (ref 12–15.9)
HOLD SPECIMEN: NORMAL
HOLD SPECIMEN: NORMAL
IMM GRANULOCYTES # BLD AUTO: 0.03 10*3/MM3 (ref 0–0.05)
IMM GRANULOCYTES NFR BLD AUTO: 0.3 % (ref 0–0.5)
INR PPP: 0.99 (ref 0.91–1.09)
LYMPHOCYTES # BLD AUTO: 2.16 10*3/MM3 (ref 0.7–3.1)
LYMPHOCYTES NFR BLD AUTO: 21.5 % (ref 19.6–45.3)
MAGNESIUM SERPL-MCNC: 1.5 MG/DL (ref 1.6–2.6)
MCH RBC QN AUTO: 28.8 PG (ref 26.6–33)
MCHC RBC AUTO-ENTMCNC: 30.8 G/DL (ref 31.5–35.7)
MCV RBC AUTO: 93.3 FL (ref 79–97)
MONOCYTES # BLD AUTO: 0.85 10*3/MM3 (ref 0.1–0.9)
MONOCYTES NFR BLD AUTO: 8.4 % (ref 5–12)
NEUTROPHILS NFR BLD AUTO: 5.89 10*3/MM3 (ref 1.7–7)
NEUTROPHILS NFR BLD AUTO: 58.6 % (ref 42.7–76)
NRBC BLD AUTO-RTO: 0 /100 WBC (ref 0–0.2)
NT-PROBNP SERPL-MCNC: 1908 PG/ML (ref 0–900)
PLATELET # BLD AUTO: 253 10*3/MM3 (ref 140–450)
PMV BLD AUTO: 10.9 FL (ref 6–12)
POTASSIUM SERPL-SCNC: 2.9 MMOL/L (ref 3.5–5.2)
POTASSIUM SERPL-SCNC: 3.3 MMOL/L (ref 3.5–5.2)
PROT SERPL-MCNC: 6.6 G/DL (ref 6–8.5)
PROTHROMBIN TIME: 13.2 SECONDS (ref 11.8–14.8)
RBC # BLD AUTO: 3.58 10*6/MM3 (ref 3.77–5.28)
SODIUM SERPL-SCNC: 140 MMOL/L (ref 136–145)
TROPONIN T DELTA: 1 NG/L
TROPONIN T SERPL HS-MCNC: 22 NG/L
WBC NRBC COR # BLD: 10.06 10*3/MM3 (ref 3.4–10.8)
WHOLE BLOOD HOLD COAG: NORMAL
WHOLE BLOOD HOLD SPECIMEN: NORMAL

## 2023-09-04 PROCEDURE — 96368 THER/DIAG CONCURRENT INF: CPT

## 2023-09-04 PROCEDURE — 99284 EMERGENCY DEPT VISIT MOD MDM: CPT

## 2023-09-04 PROCEDURE — 84132 ASSAY OF SERUM POTASSIUM: CPT | Performed by: FAMILY MEDICINE

## 2023-09-04 PROCEDURE — 84484 ASSAY OF TROPONIN QUANT: CPT | Performed by: FAMILY MEDICINE

## 2023-09-04 PROCEDURE — 25010000002 SODIUM CHLORIDE 0.9 % WITH KCL 20 MEQ 20-0.9 MEQ/L-% SOLUTION: Performed by: FAMILY MEDICINE

## 2023-09-04 PROCEDURE — 25010000002 MAGNESIUM SULFATE 2 GM/50ML SOLUTION: Performed by: FAMILY MEDICINE

## 2023-09-04 PROCEDURE — 71045 X-RAY EXAM CHEST 1 VIEW: CPT

## 2023-09-04 PROCEDURE — 85730 THROMBOPLASTIN TIME PARTIAL: CPT | Performed by: FAMILY MEDICINE

## 2023-09-04 PROCEDURE — 93005 ELECTROCARDIOGRAM TRACING: CPT | Performed by: FAMILY MEDICINE

## 2023-09-04 PROCEDURE — 96366 THER/PROPH/DIAG IV INF ADDON: CPT

## 2023-09-04 PROCEDURE — 85025 COMPLETE CBC W/AUTO DIFF WBC: CPT | Performed by: FAMILY MEDICINE

## 2023-09-04 PROCEDURE — 85610 PROTHROMBIN TIME: CPT | Performed by: FAMILY MEDICINE

## 2023-09-04 PROCEDURE — 93010 ELECTROCARDIOGRAM REPORT: CPT | Performed by: INTERNAL MEDICINE

## 2023-09-04 PROCEDURE — 94640 AIRWAY INHALATION TREATMENT: CPT

## 2023-09-04 PROCEDURE — 83880 ASSAY OF NATRIURETIC PEPTIDE: CPT | Performed by: FAMILY MEDICINE

## 2023-09-04 PROCEDURE — 93005 ELECTROCARDIOGRAM TRACING: CPT

## 2023-09-04 PROCEDURE — 96365 THER/PROPH/DIAG IV INF INIT: CPT

## 2023-09-04 PROCEDURE — 83735 ASSAY OF MAGNESIUM: CPT | Performed by: FAMILY MEDICINE

## 2023-09-04 PROCEDURE — 80053 COMPREHEN METABOLIC PANEL: CPT | Performed by: FAMILY MEDICINE

## 2023-09-04 PROCEDURE — 96361 HYDRATE IV INFUSION ADD-ON: CPT

## 2023-09-04 PROCEDURE — 36415 COLL VENOUS BLD VENIPUNCTURE: CPT

## 2023-09-04 RX ORDER — POTASSIUM CHLORIDE 1500 MG/1
20 TABLET, EXTENDED RELEASE ORAL DAILY
Qty: 5 TABLET | Refills: 0 | Status: SHIPPED | OUTPATIENT
Start: 2023-09-04 | End: 2023-09-09

## 2023-09-04 RX ORDER — POTASSIUM CHLORIDE 20 MEQ/1
40 TABLET, EXTENDED RELEASE ORAL ONCE
Status: COMPLETED | OUTPATIENT
Start: 2023-09-04 | End: 2023-09-04

## 2023-09-04 RX ORDER — IPRATROPIUM BROMIDE AND ALBUTEROL SULFATE 2.5; .5 MG/3ML; MG/3ML
3 SOLUTION RESPIRATORY (INHALATION) ONCE
Status: COMPLETED | OUTPATIENT
Start: 2023-09-04 | End: 2023-09-04

## 2023-09-04 RX ORDER — SODIUM CHLORIDE 0.9 % (FLUSH) 0.9 %
10 SYRINGE (ML) INJECTION AS NEEDED
Status: DISCONTINUED | OUTPATIENT
Start: 2023-09-04 | End: 2023-09-04 | Stop reason: HOSPADM

## 2023-09-04 RX ORDER — SODIUM CHLORIDE AND POTASSIUM CHLORIDE 150; 900 MG/100ML; MG/100ML
500 INJECTION, SOLUTION INTRAVENOUS CONTINUOUS
Status: DISCONTINUED | OUTPATIENT
Start: 2023-09-04 | End: 2023-09-04 | Stop reason: HOSPADM

## 2023-09-04 RX ORDER — ASPIRIN 81 MG/1
243 TABLET, CHEWABLE ORAL ONCE
Status: COMPLETED | OUTPATIENT
Start: 2023-09-04 | End: 2023-09-04

## 2023-09-04 RX ORDER — MAGNESIUM SULFATE HEPTAHYDRATE 40 MG/ML
2 INJECTION, SOLUTION INTRAVENOUS ONCE
Status: COMPLETED | OUTPATIENT
Start: 2023-09-04 | End: 2023-09-04

## 2023-09-04 RX ADMIN — IPRATROPIUM BROMIDE AND ALBUTEROL SULFATE 3 ML: .5; 3 SOLUTION RESPIRATORY (INHALATION) at 15:18

## 2023-09-04 RX ADMIN — ASPIRIN 243 MG: 81 TABLET, CHEWABLE ORAL at 15:39

## 2023-09-04 RX ADMIN — POTASSIUM CHLORIDE 40 MEQ: 1500 TABLET, EXTENDED RELEASE ORAL at 15:37

## 2023-09-04 RX ADMIN — MAGNESIUM SULFATE HEPTAHYDRATE 2 G: 2 INJECTION, SOLUTION INTRAVENOUS at 15:39

## 2023-09-04 RX ADMIN — POTASSIUM CHLORIDE AND SODIUM CHLORIDE 500 ML/HR: 900; 150 INJECTION, SOLUTION INTRAVENOUS at 15:38

## 2023-09-04 NOTE — ED PROVIDER NOTES
HPI:     Patient is a 58-year-old white female with known history of coronary artery disease who has a cardiologist Dr. Patrick here at [facility] who presents with a complaint of him some off-and-on chest pain for the past 3 days.  Patient has had 3 stents.  Patient had 2 stents placed July 29 by Dr. Patrick and then had a third stent placed on August 17 by Dr. Patrick all of these are in this year 2023.  Patient denies any trauma.  Patient states she is taking her Plavix as she is supposed to and her baby aspirin every day.  Patient states that she started having these off-and-on chest pains as previously stated past couple of days.  She states that she gets hard to take a deep breath and is worse when she lays backwards.  No fever chills or night sweats positive nausea but no vomiting or diarrhea.      REVIEW OF SYSTEMS  CONSTITUTIONAL:  No complaints of fever, chills,or weakness  EYES:  No complaints of discharge   ENT: No complaints of sore throat or ear pain  CARDIOVASCULAR: Positive for anterior chest wall substernal chest discomfort nonradiating RESPIRATORY: Positive for feeling of shortness of breath worse with ambulation and laying flat.  GI:  No complaints of abdominal pain, nausea, vomiting, or diarrhea  MUSCULOSKELETAL:  No complaints of back pain  SKIN:  No complaints of rash  NEUROLOGIC:  No complaints of headache, focal weakness, or sensory changes  ENDOCRINE:  No complaints of polyuria or polydipsia  LYMPHATIC:  No complaints of swollen glands  GENITOURINARY: No complaints of urinary frequency or hematuria        PAST MEDICAL HISTORY  Past Medical History:   Diagnosis Date    CHF (congestive heart failure)     Chronic pain syndrome     COPD (chronic obstructive pulmonary disease)     Tobacco dependence        FAMILY HISTORY  Family History   Problem Relation Age of Onset    COPD Mother     Diabetes Father     Heart failure Father        SOCIAL HISTORY  Social History     Socioeconomic  History    Marital status: Single   Tobacco Use    Smoking status: Every Day     Packs/day: 1.00     Years: 51.00     Pack years: 51.00     Types: Cigarettes    Smokeless tobacco: Never   Vaping Use    Vaping Use: Never used   Substance and Sexual Activity    Alcohol use: Not Currently    Drug use: Not Currently    Sexual activity: Defer       IMMUNIZATION HISTORY  Deferred to primary care physician.    SURGICAL HISTORY  Past Surgical History:   Procedure Laterality Date    CARDIAC CATHETERIZATION N/A 07/29/2023    Procedure: Left Heart Cath;  Surgeon: Osman Patrick DO;  Location:  [facility];  Service: Cardiovascular;  Laterality: N/A;    CARDIAC CATHETERIZATION N/A 8/17/2023    Procedure: Percutaneous Coronary Intervention;  Surgeon: Osman Patrick DO;  Location:  [facility];  Service: Cardiovascular;  Laterality: N/A;    CHOLECYSTECTOMY      CORONARY STENT PLACEMENT  2023    X 2    HYSTERECTOMY      KNEE ARTHROSCOPY W/ MEDIAL COLLATERAL LIGAMENT (MCL) REPAIR Right     x 2    TONSILLECTOMY         CURRENT MEDICATIONS    Current Facility-Administered Medications:     aspirin chewable tablet 243 mg, 243 mg, Oral, Once, Tlely Pruett Jr., MD    ipratropium-albuterol (DUO-NEB) nebulizer solution 3 mL, 3 mL, Nebulization, Once, Telly Pruett Jr., MD    magnesium sulfate 2g/50 mL (PREMIX) infusion, 2 g, Intravenous, Once, Telly Pruett Jr., MD    potassium chloride (K-DUR,KLOR-CON) CR tablet 40 mEq, 40 mEq, Oral, Once, Telly Pruett Jr., MD    Insert Peripheral IV, , , Once **AND** sodium chloride 0.9 % flush 10 mL, 10 mL, Intravenous, PRN, Telly Pruett Jr., MD    sodium chloride 0.9 % with KCl 20 mEq/L infusion, 500 mL/hr, Intravenous, Continuous, Telly Pruett Jr., MD    Current Outpatient Medications:     aspirin 81 MG EC tablet, Take 1 tablet by mouth Daily., Disp: 30 tablet, Rfl: 2    atorvastatin (LIPITOR) 40 MG  tablet, Take 1 tablet by mouth Every Night., Disp: 30 tablet, Rfl: 2    carvedilol (COREG) 6.25 MG tablet, Take 1 tablet by mouth 2 (Two) Times a Day With Meals., Disp: 60 tablet, Rfl: 1    clopidogrel (PLAVIX) 75 MG tablet, Take 1 tablet by mouth Daily., Disp: 30 tablet, Rfl: 2    FLUoxetine (PROzac) 20 MG capsule, Take 2 capsules by mouth Daily., Disp: , Rfl:     furosemide (LASIX) 40 MG tablet, Take 1 tablet by mouth Daily As Needed., Disp: , Rfl:     gabapentin (NEURONTIN) 600 MG tablet, Take 1 tablet by mouth 3 (Three) Times a Day., Disp: , Rfl:     HYDROcodone-acetaminophen (NORCO)  MG per tablet, Take 1 tablet by mouth Every 8 (Eight) Hours As Needed for Moderate Pain., Disp: , Rfl:     isosorbide mononitrate (IMDUR) 30 MG 24 hr tablet, Take 1 tablet by mouth Daily., Disp: 30 tablet, Rfl: 11    meloxicam (MOBIC) 15 MG tablet, Take 1 tablet by mouth Daily., Disp: , Rfl:     pantoprazole (PROTONIX) 40 MG EC tablet, Take 1 tablet by mouth 2 (Two) Times a Day., Disp: , Rfl:     ranolazine (Ranexa) 500 MG 12 hr tablet, Take 1 tablet by mouth 2 (Two) Times a Day., Disp: 60 tablet, Rfl: 5    sacubitril-valsartan (ENTRESTO) 24-26 MG tablet, Take 1 tablet by mouth 2 (Two) Times a Day for 180 days., Disp: 60 tablet, Rfl: 5    spironolactone (ALDACTONE) 25 MG tablet, Take 1 tablet by mouth Daily., Disp: 30 tablet, Rfl: 2    SUMAtriptan (IMITREX) 50 MG tablet, Take 1 tablet by mouth Every 2 (Two) Hours As Needed for Migraine. Take one tablet at onset of headache. May repeat dose one time in 2 hours if headache not relieved., Disp: , Rfl:     tiZANidine (ZANAFLEX) 4 MG tablet, Take 1 tablet by mouth Every 8 (Eight) Hours As Needed for Muscle Spasms., Disp: , Rfl:     topiramate (TOPAMAX) 50 MG tablet, Take 1 tablet by mouth Daily., Disp: , Rfl:     zolpidem (AMBIEN) 10 MG tablet, Take 1 tablet by mouth At Night As Needed for Sleep., Disp: , Rfl:     ALLERGIES  Allergies   Allergen Reactions    Levaquin  [Levofloxacin] Shortness Of Breath    Morphine Shortness Of Breath    Codeine Unknown - High Severity    Doxycycline Unknown - High Severity    Metronidazole Unknown - High Severity    Naproxen Unknown - High Severity    Propoxyphene Unknown - High Severity    Tetanus Toxoids Unknown - High Severity    Tetracyclines & Related Unknown - High Severity           Cardiac exam    VITAL SIGNS:  /76   Pulse 79   Temp 98 °F (36.7 °C)   Resp 18   SpO2 98%     Constitutional: Patient is alert and in no distress.  Patient with mild anterior chest wall discomfort.    ENT: There is a normal pharynx with no acute erythema or exudate and oral mucosa is moist.  Nose is clear with no drainage.  Tympanic membranes intact and nonerythemic    Respiratory: Patient is clear to auscultation bilaterally with no wheezing or rhonchi.  Chest wall is mildly tender to palpation which reproduces much of the patient's discomfort..  There are no external lesions on the chest.  There is no crepitance    Cardiovascular: S1-S2 with a diastolic murmur 1 out of 5 but no murmurs rubs or gallops.    Abdomen: Soft, nontender, and  bowel sounds are normal in all 4 quadrants.  There is no rebound or guarding noted.  There is no abdominal distention or hepatosplenomegaly.    Genitourinary: Patient is voiding appropriately.    Integument: No acute lesions noted and color appears to be normal.    Rafia Coma Scale: Total score 15    Neurological: Patient is alert and oriented x4 and no acute findings noted.  Speech is fluent and cognition is normal.  No evidence of acute CVA.  Cranial nerves II through XII intact.  Patient with normal motor function as well as reflexes and sensation        RADIOLOGY/PROCEDURES      XR Chest 1 View   Final Result   1. No acute disease.               This report was finalized on 09/04/2023 14:52 by Dr. Narendra Morgan MD.             FUTURE APPOINTMENTS     Future Appointments   Date Time Provider Department Center    9/27/2023  8:30 AM Osman Patrick, DO [facility]              HEART SCORE     Patient history  1      Moderately suspicious (1 point)    2   ECG     Nonspecific repolarisation disturbance (1 point)    3   Patient age       Between 45 and 65 (1 point)    4   Risk factors (Hypercholesterolemia, Hypertension, diabetes, smoking, obesity)     More than 3 risk factors or atherosclerosis history (2 points)    5   Troponin       1 to 3 times higher than normal (1 point)      6     TOTAL RISK NUMBER: 6    The three risk categories are described below:  Heart score MACE risk Recommendation  0 - 3 Low (1.7%) Discharge can be an option.  4 - 6 Intermediate (20.3%) Clinical observation and further investigations.  7 - 10 High (72.2%) Immediate invasive treatment        COURSE & MEDICAL DECISION MAKING       Patient's partial differential diagnosis can include: aTypical chest pain, anxiety, panic attacks stable angina, unstable angina, hyperkalemia, arrhythmias, electrolyte abnormality and others      Discussed with the patient and the patient's laboratory and radiological test.  Including her troponin which was mildly elevated but level did not significantly increase in number.  Patient's symptoms has all resolved.  Her potassium has been improved and replaced from 2.9-3.3.  Her magnesium is also been replaced by given 2 g for low magnesium 1.5.  Patient feels comfortable going home and following up outpatient.      Patient's level of risk: Moderate        CRITICAL CARE    CRITICAL CARE: No    CRITICAL CARE TIME: None      Recent Results (from the past 24 hour(s))   ECG 12 Lead Chest Pain    Collection Time: 09/04/23  1:47 PM   Result Value Ref Range    QT Interval 424 ms    QTC Interval 467 ms   Comprehensive Metabolic Panel    Collection Time: 09/04/23  2:15 PM    Specimen: Blood   Result Value Ref Range    Glucose 126 (H) 65 - 99 mg/dL    BUN 9 6 - 20 mg/dL    Creatinine 0.82 0.57 - 1.00 mg/dL    Sodium 140  136 - 145 mmol/L    Potassium 2.9 (L) 3.5 - 5.2 mmol/L    Chloride 105 98 - 107 mmol/L    CO2 23.0 22.0 - 29.0 mmol/L    Calcium 8.7 8.6 - 10.5 mg/dL    Total Protein 6.6 6.0 - 8.5 g/dL    Albumin 3.8 3.5 - 5.2 g/dL    ALT (SGPT) 9 1 - 33 U/L    AST (SGOT) 12 1 - 32 U/L    Alkaline Phosphatase 102 39 - 117 U/L    Total Bilirubin <0.2 0.0 - 1.2 mg/dL    Globulin 2.8 gm/dL    A/G Ratio 1.4 g/dL    BUN/Creatinine Ratio 11.0 7.0 - 25.0    Anion Gap 12.0 5.0 - 15.0 mmol/L    eGFR 84.1 >60.0 mL/min/1.73   aPTT    Collection Time: 09/04/23  2:15 PM    Specimen: Blood   Result Value Ref Range    PTT 30.4 24.1 - 35.0 seconds   Protime-INR    Collection Time: 09/04/23  2:15 PM    Specimen: Blood   Result Value Ref Range    Protime 13.2 11.8 - 14.8 Seconds    INR 0.99 0.91 - 1.09   High Sensitivity Troponin T    Collection Time: 09/04/23  2:15 PM    Specimen: Blood   Result Value Ref Range    HS Troponin T 22 (H) <10 ng/L   CBC Auto Differential    Collection Time: 09/04/23  2:15 PM    Specimen: Blood   Result Value Ref Range    WBC 10.06 3.40 - 10.80 10*3/mm3    RBC 3.58 (L) 3.77 - 5.28 10*6/mm3    Hemoglobin 10.3 (L) 12.0 - 15.9 g/dL    Hematocrit 33.4 (L) 34.0 - 46.6 %    MCV 93.3 79.0 - 97.0 fL    MCH 28.8 26.6 - 33.0 pg    MCHC 30.8 (L) 31.5 - 35.7 g/dL    RDW 14.6 12.3 - 15.4 %    RDW-SD 50.4 37.0 - 54.0 fl    MPV 10.9 6.0 - 12.0 fL    Platelets 253 140 - 450 10*3/mm3    Neutrophil % 58.6 42.7 - 76.0 %    Lymphocyte % 21.5 19.6 - 45.3 %    Monocyte % 8.4 5.0 - 12.0 %    Eosinophil % 11.1 (H) 0.3 - 6.2 %    Basophil % 0.1 0.0 - 1.5 %    Immature Grans % 0.3 0.0 - 0.5 %    Neutrophils, Absolute 5.89 1.70 - 7.00 10*3/mm3    Lymphocytes, Absolute 2.16 0.70 - 3.10 10*3/mm3    Monocytes, Absolute 0.85 0.10 - 0.90 10*3/mm3    Eosinophils, Absolute 1.12 (H) 0.00 - 0.40 10*3/mm3    Basophils, Absolute 0.01 0.00 - 0.20 10*3/mm3    Immature Grans, Absolute 0.03 0.00 - 0.05 10*3/mm3    nRBC 0.0 0.0 - 0.2 /100 WBC   Magnesium     Collection Time: 09/04/23  2:15 PM    Specimen: Blood   Result Value Ref Range    Magnesium 1.5 (L) 1.6 - 2.6 mg/dL            The patient's last clinical visit to PCP was reviewed by me:       Old charts were reviewed per [facility] EMR.  Pertinent details are summarized above.  All laboratory, radiologic, and EKG studies that were performed in the Emergency Department were a necessary part of the evaluation needed to exclude unstable or  emergent medical conditions.     Patient was hemodynamically and neurologically stable in the ED.   Pertinent studies were reviewed as above.     The patient received:  Medications   sodium chloride 0.9 % flush 10 mL (has no administration in time range)   ipratropium-albuterol (DUO-NEB) nebulizer solution 3 mL (has no administration in time range)   aspirin chewable tablet 243 mg (has no administration in time range)   potassium chloride (K-DUR,KLOR-CON) CR tablet 40 mEq (has no administration in time range)   sodium chloride 0.9 % with KCl 20 mEq/L infusion (has no administration in time range)   magnesium sulfate 2g/50 mL (PREMIX) infusion (has no administration in time range)            ED Disposition       None              Dragon disclaimer:  Part of this note may be an electronic transcription/translation of spoken language to printed text using the Dragon Dictation System. Due to this some dictation errors could occur in the chart.    I have reviewed the patient’s prescription history via a prescription monitoring program.  This information is consistent with my knowledge of the patient’s controlled substance use history.    Patient evaluated during Coronavirus Pandemic. Isolation practices followed according to [facility] policy.     FINAL IMPRESSION   Diagnosis Plan   1. Atypical chest pain        2. Hypokalemia        3. Hypomagnesemia              MD Flynn Phelps Jr, Thomas Mark Jr., MD  09/04/23 2087

## 2023-09-04 NOTE — DISCHARGE INSTRUCTIONS
You are going home please follow-up with your cardiologist sometime this week.  Take your potassium pills as prescribed however if you would prefer to eat foods that are high in potassium and handout has been provided for you with that information.  Would recommend that your PCP redraw your laboratory test on Friday to recheck your potassium, and magnesium levels.

## 2023-09-05 LAB
QT INTERVAL: 424 MS
QTC INTERVAL: 467 MS

## 2023-10-31 ENCOUNTER — TELEPHONE (OUTPATIENT)
Dept: CARDIOLOGY | Facility: CLINIC | Age: 58
End: 2023-10-31

## 2023-10-31 NOTE — TELEPHONE ENCOUNTER
Caller: Alannah Barr    Relationship to patient: Self    Best call back number: [phone] (home)     Chief complaint: HAD NO INSURANCE, CAN NOW COME TO APPT    Type of visit: FOLLOW UP    Requested date: ASAP     Additional notes:PATIENT HAD LOST HER JOB, SO SHE HAD NO INSURANCE TO COME IN ON 9.27.23. PATIENT NOW HAS MEDICAID AND WOULD LIKE TO COME IN ASAP TO BE SEEN AND MAKE SURE SHE'S DOING OKAY. PLEASE CALL PATIENT REGARDING APPOINTMENT(PATIENT DOESN'T HAVE THE INSURANCE CARD AT THE MOMENT BUT SHOULD BE RECEIVING IT IN THE NEXT COUPLE DAYS)

## 2023-11-20 RX ORDER — CARVEDILOL 6.25 MG/1
6.25 TABLET ORAL 2 TIMES DAILY WITH MEALS
Qty: 60 TABLET | Refills: 6 | Status: SHIPPED | OUTPATIENT
Start: 2023-11-20

## 2023-11-28 ENCOUNTER — TELEPHONE (OUTPATIENT)
Dept: CARDIOLOGY | Facility: CLINIC | Age: 58
End: 2023-11-28
Payer: COMMERCIAL

## 2023-11-28 NOTE — TELEPHONE ENCOUNTER
PT DROPPED OFF PAPERWORK FOR FMLA, WE HAVE NOT SEEN PT SINCE AUG 2023.  TRIED TO CALL PT TO FIND OUT WHAT SHE IS NEEDING BUT COULD NOT LM AND THERE WAS NA.  SCANNED PAPERWORK TO MEDIA

## 2023-12-05 RX ORDER — ATORVASTATIN CALCIUM 40 MG/1
40 TABLET, FILM COATED ORAL NIGHTLY
Qty: 30 TABLET | Refills: 0 | Status: SHIPPED | OUTPATIENT
Start: 2023-12-05

## 2023-12-05 RX ORDER — SPIRONOLACTONE 25 MG/1
25 TABLET ORAL DAILY
Qty: 30 TABLET | Refills: 0 | Status: SHIPPED | OUTPATIENT
Start: 2023-12-05

## 2023-12-14 ENCOUNTER — TELEPHONE (OUTPATIENT)
Dept: CARDIOLOGY | Facility: CLINIC | Age: 58
End: 2023-12-14
Payer: COMMERCIAL

## 2023-12-22 ENCOUNTER — TRANSCRIBE ORDERS (OUTPATIENT)
Dept: ADMINISTRATIVE | Facility: HOSPITAL | Age: 58
End: 2023-12-22
Payer: COMMERCIAL

## 2023-12-22 DIAGNOSIS — N63.10 MASS OF RIGHT BREAST, UNSPECIFIED QUADRANT: Primary | ICD-10-CM

## 2023-12-25 LAB
NCCN CRITERIA FLAG: NORMAL
TYRER CUZICK SCORE: 6

## 2024-01-04 RX ORDER — SPIRONOLACTONE 25 MG/1
25 TABLET ORAL DAILY
Qty: 30 TABLET | Refills: 0 | Status: SHIPPED | OUTPATIENT
Start: 2024-01-04

## 2024-01-04 RX ORDER — ATORVASTATIN CALCIUM 40 MG/1
40 TABLET, FILM COATED ORAL NIGHTLY
Qty: 30 TABLET | Refills: 0 | Status: SHIPPED | OUTPATIENT
Start: 2024-01-04

## 2024-01-15 ENCOUNTER — TELEPHONE (OUTPATIENT)
Dept: CARDIOLOGY | Facility: CLINIC | Age: 59
End: 2024-01-15

## 2024-01-24 ENCOUNTER — HOSPITAL ENCOUNTER (OUTPATIENT)
Dept: MAMMOGRAPHY | Facility: HOSPITAL | Age: 59
Discharge: HOME | End: 2024-01-24
Payer: COMMERCIAL

## 2024-01-24 ENCOUNTER — HOSPITAL ENCOUNTER (OUTPATIENT)
Dept: ULTRASOUND IMAGING | Facility: HOSPITAL | Age: 59
Discharge: HOME | End: 2024-01-24
Payer: COMMERCIAL

## 2024-01-24 DIAGNOSIS — N63.10 MASS OF RIGHT BREAST, UNSPECIFIED QUADRANT: ICD-10-CM

## 2024-01-24 PROCEDURE — 77066 DX MAMMO INCL CAD BI: CPT

## 2024-01-24 PROCEDURE — G0279 TOMOSYNTHESIS, MAMMO: HCPCS

## 2024-02-06 RX ORDER — ATORVASTATIN CALCIUM 40 MG/1
40 TABLET, FILM COATED ORAL NIGHTLY
Qty: 30 TABLET | Refills: 0 | OUTPATIENT
Start: 2024-02-06

## 2024-02-06 RX ORDER — SPIRONOLACTONE 25 MG/1
25 TABLET ORAL DAILY
Qty: 30 TABLET | Refills: 0 | OUTPATIENT
Start: 2024-02-06

## 2024-02-15 RX ORDER — SPIRONOLACTONE 25 MG/1
25 TABLET ORAL DAILY
Qty: 30 TABLET | Refills: 0 | OUTPATIENT
Start: 2024-02-15

## 2024-02-15 RX ORDER — ATORVASTATIN CALCIUM 40 MG/1
40 TABLET, FILM COATED ORAL NIGHTLY
Qty: 30 TABLET | Refills: 0 | OUTPATIENT
Start: 2024-02-15

## 2024-03-06 RX ORDER — SACUBITRIL AND VALSARTAN 24; 26 MG/1; MG/1
1 TABLET, FILM COATED ORAL 2 TIMES DAILY
Refills: 0 | OUTPATIENT
Start: 2024-03-06

## 2024-03-06 RX ORDER — RANOLAZINE 500 MG/1
500 TABLET, EXTENDED RELEASE ORAL 2 TIMES DAILY
Refills: 0 | OUTPATIENT
Start: 2024-03-06

## 2024-03-20 RX ORDER — RANOLAZINE 500 MG/1
500 TABLET, EXTENDED RELEASE ORAL 2 TIMES DAILY
Refills: 0 | OUTPATIENT
Start: 2024-03-20

## 2024-03-20 RX ORDER — SACUBITRIL AND VALSARTAN 24; 26 MG/1; MG/1
1 TABLET, FILM COATED ORAL 2 TIMES DAILY
Refills: 0 | OUTPATIENT
Start: 2024-03-20

## 2024-04-25 ENCOUNTER — HOSPITAL ENCOUNTER (OUTPATIENT)
Facility: HOSPITAL | Age: 59
Discharge: HOME | End: 2024-04-28
Attending: FAMILY MEDICINE | Admitting: INTERNAL MEDICINE
Payer: COMMERCIAL

## 2024-04-25 ENCOUNTER — APPOINTMENT (OUTPATIENT)
Dept: CT IMAGING | Facility: HOSPITAL | Age: 59
End: 2024-04-25
Payer: COMMERCIAL

## 2024-04-25 DIAGNOSIS — I25.118 CORONARY ARTERY DISEASE OF NATIVE ARTERY OF NATIVE HEART WITH STABLE ANGINA PECTORIS: ICD-10-CM

## 2024-04-25 DIAGNOSIS — I20.89 STABLE ANGINA PECTORIS: Primary | ICD-10-CM

## 2024-04-25 PROBLEM — I50.32 CHRONIC DIASTOLIC CHF (CONGESTIVE HEART FAILURE): Status: ACTIVE | Noted: 2024-04-25

## 2024-04-25 PROBLEM — F41.9 ANXIETY DISORDER: Status: ACTIVE | Noted: 2024-04-25

## 2024-04-25 PROBLEM — R07.9 CHEST PAIN: Status: ACTIVE | Noted: 2024-04-25

## 2024-04-25 PROBLEM — R79.89 ELEVATED D-DIMER: Status: ACTIVE | Noted: 2024-04-25

## 2024-04-25 LAB
AMPHET+METHAMPHET UR QL: NEGATIVE
AMPHETAMINES UR QL: NEGATIVE
ANION GAP SERPL CALCULATED.3IONS-SCNC: 11 MMOL/L (ref 5–15)
BARBITURATES UR QL SCN: NEGATIVE
BASOPHILS # BLD AUTO: 0.05 10*3/MM3 (ref 0–0.2)
BASOPHILS NFR BLD AUTO: 0.6 % (ref 0–1.5)
BENZODIAZ UR QL SCN: NEGATIVE
BUN SERPL-MCNC: 8 MG/DL (ref 6–20)
BUN/CREAT SERPL: 12.1 (ref 7–25)
BUPRENORPHINE SERPL-MCNC: NEGATIVE NG/ML
CALCIUM SPEC-SCNC: 9 MG/DL (ref 8.6–10.5)
CANNABINOIDS SERPL QL: NEGATIVE
CHLORIDE SERPL-SCNC: 106 MMOL/L (ref 98–107)
CHOLEST SERPL-MCNC: 160 MG/DL (ref 0–200)
CO2 SERPL-SCNC: 25 MMOL/L (ref 22–29)
COCAINE UR QL: NEGATIVE
CREAT SERPL-MCNC: 0.66 MG/DL (ref 0.57–1)
DEPRECATED RDW RBC AUTO: 48.8 FL (ref 37–54)
EGFRCR SERPLBLD CKD-EPI 2021: 103.1 ML/MIN/1.73
EOSINOPHIL # BLD AUTO: 0.47 10*3/MM3 (ref 0–0.4)
EOSINOPHIL NFR BLD AUTO: 5.7 % (ref 0.3–6.2)
ERYTHROCYTE [DISTWIDTH] IN BLOOD BY AUTOMATED COUNT: 15.1 % (ref 12.3–15.4)
FENTANYL UR-MCNC: NEGATIVE NG/ML
GLUCOSE SERPL-MCNC: 103 MG/DL (ref 65–99)
HBA1C MFR BLD: 5.7 % (ref 4.8–5.6)
HCT VFR BLD AUTO: 35.3 % (ref 34–46.6)
HDLC SERPL-MCNC: 52 MG/DL (ref 40–60)
HGB BLD-MCNC: 11.1 G/DL (ref 12–15.9)
IMM GRANULOCYTES # BLD AUTO: 0.01 10*3/MM3 (ref 0–0.05)
IMM GRANULOCYTES NFR BLD AUTO: 0.1 % (ref 0–0.5)
LDLC SERPL CALC-MCNC: 86 MG/DL (ref 0–100)
LDLC/HDLC SERPL: 1.6 {RATIO}
LYMPHOCYTES # BLD AUTO: 3.9 10*3/MM3 (ref 0.7–3.1)
LYMPHOCYTES NFR BLD AUTO: 47.5 % (ref 19.6–45.3)
MCH RBC QN AUTO: 27.7 PG (ref 26.6–33)
MCHC RBC AUTO-ENTMCNC: 31.4 G/DL (ref 31.5–35.7)
MCV RBC AUTO: 88 FL (ref 79–97)
METHADONE UR QL SCN: NEGATIVE
MONOCYTES # BLD AUTO: 0.92 10*3/MM3 (ref 0.1–0.9)
MONOCYTES NFR BLD AUTO: 11.2 % (ref 5–12)
NEUTROPHILS NFR BLD AUTO: 2.86 10*3/MM3 (ref 1.7–7)
NEUTROPHILS NFR BLD AUTO: 34.9 % (ref 42.7–76)
NRBC BLD AUTO-RTO: 0 /100 WBC (ref 0–0.2)
OPIATES UR QL: POSITIVE
OXYCODONE UR QL SCN: NEGATIVE
PCP UR QL SCN: NEGATIVE
PLATELET # BLD AUTO: 288 10*3/MM3 (ref 140–450)
PMV BLD AUTO: 10.3 FL (ref 6–12)
POTASSIUM SERPL-SCNC: 3.2 MMOL/L (ref 3.5–5.2)
PROCALCITONIN SERPL-MCNC: 0.04 NG/ML (ref 0–0.25)
RBC # BLD AUTO: 4.01 10*6/MM3 (ref 3.77–5.28)
SODIUM SERPL-SCNC: 142 MMOL/L (ref 136–145)
TRICYCLICS UR QL SCN: NEGATIVE
TRIGL SERPL-MCNC: 124 MG/DL (ref 0–150)
TROPONIN T SERPL HS-MCNC: 12 NG/L
VLDLC SERPL-MCNC: 22 MG/DL (ref 5–40)
WBC NRBC COR # BLD AUTO: 8.21 10*3/MM3 (ref 3.4–10.8)

## 2024-04-25 PROCEDURE — 93005 ELECTROCARDIOGRAM TRACING: CPT | Performed by: NURSE PRACTITIONER

## 2024-04-25 PROCEDURE — 84145 PROCALCITONIN (PCT): CPT | Performed by: INTERNAL MEDICINE

## 2024-04-25 PROCEDURE — 85025 COMPLETE CBC W/AUTO DIFF WBC: CPT | Performed by: INTERNAL MEDICINE

## 2024-04-25 PROCEDURE — 80048 BASIC METABOLIC PNL TOTAL CA: CPT | Performed by: INTERNAL MEDICINE

## 2024-04-25 PROCEDURE — 25510000001 IOPAMIDOL PER 1 ML: Performed by: INTERNAL MEDICINE

## 2024-04-25 PROCEDURE — 71275 CT ANGIOGRAPHY CHEST: CPT

## 2024-04-25 PROCEDURE — 80061 LIPID PANEL: CPT | Performed by: NURSE PRACTITIONER

## 2024-04-25 PROCEDURE — 84484 ASSAY OF TROPONIN QUANT: CPT | Performed by: INTERNAL MEDICINE

## 2024-04-25 PROCEDURE — G0378 HOSPITAL OBSERVATION PER HR: HCPCS

## 2024-04-25 PROCEDURE — 83036 HEMOGLOBIN GLYCOSYLATED A1C: CPT | Performed by: NURSE PRACTITIONER

## 2024-04-25 PROCEDURE — 80307 DRUG TEST PRSMV CHEM ANLYZR: CPT | Performed by: INTERNAL MEDICINE

## 2024-04-25 PROCEDURE — 96372 THER/PROPH/DIAG INJ SC/IM: CPT

## 2024-04-25 PROCEDURE — G0379 DIRECT REFER HOSPITAL OBSERV: HCPCS

## 2024-04-25 PROCEDURE — 25010000002 ENOXAPARIN PER 10 MG: Performed by: NURSE PRACTITIONER

## 2024-04-25 RX ORDER — SODIUM CHLORIDE 9 MG/ML
40 INJECTION, SOLUTION INTRAVENOUS AS NEEDED
Status: DISCONTINUED | OUTPATIENT
Start: 2024-04-25 | End: 2024-04-28 | Stop reason: HOSPADM

## 2024-04-25 RX ORDER — RANOLAZINE 500 MG/1
500 TABLET, EXTENDED RELEASE ORAL 2 TIMES DAILY
Status: DISCONTINUED | OUTPATIENT
Start: 2024-04-25 | End: 2024-04-28 | Stop reason: HOSPADM

## 2024-04-25 RX ORDER — CARVEDILOL 6.25 MG/1
6.25 TABLET ORAL 2 TIMES DAILY WITH MEALS
Status: DISCONTINUED | OUTPATIENT
Start: 2024-04-25 | End: 2024-04-28 | Stop reason: HOSPADM

## 2024-04-25 RX ORDER — POLYETHYLENE GLYCOL 3350 17 G/17G
17 POWDER, FOR SOLUTION ORAL DAILY PRN
Status: DISCONTINUED | OUTPATIENT
Start: 2024-04-25 | End: 2024-04-28 | Stop reason: HOSPADM

## 2024-04-25 RX ORDER — ENOXAPARIN SODIUM 100 MG/ML
40 INJECTION SUBCUTANEOUS EVERY 24 HOURS
Status: DISCONTINUED | OUTPATIENT
Start: 2024-04-25 | End: 2024-04-28 | Stop reason: HOSPADM

## 2024-04-25 RX ORDER — POTASSIUM CHLORIDE 750 MG/1
40 CAPSULE, EXTENDED RELEASE ORAL ONCE
Status: COMPLETED | OUTPATIENT
Start: 2024-04-25 | End: 2024-04-25

## 2024-04-25 RX ORDER — NICOTINE 21 MG/24HR
1 PATCH, TRANSDERMAL 24 HOURS TRANSDERMAL
Status: DISCONTINUED | OUTPATIENT
Start: 2024-04-25 | End: 2024-04-28 | Stop reason: HOSPADM

## 2024-04-25 RX ORDER — BISACODYL 5 MG/1
5 TABLET, DELAYED RELEASE ORAL DAILY PRN
Status: DISCONTINUED | OUTPATIENT
Start: 2024-04-25 | End: 2024-04-28 | Stop reason: HOSPADM

## 2024-04-25 RX ORDER — ATORVASTATIN CALCIUM 40 MG/1
40 TABLET, FILM COATED ORAL NIGHTLY
Status: DISCONTINUED | OUTPATIENT
Start: 2024-04-25 | End: 2024-04-27

## 2024-04-25 RX ORDER — FUROSEMIDE 40 MG/1
40 TABLET ORAL DAILY
Status: DISCONTINUED | OUTPATIENT
Start: 2024-04-26 | End: 2024-04-28 | Stop reason: HOSPADM

## 2024-04-25 RX ORDER — ALBUTEROL SULFATE 2.5 MG/3ML
2.5 SOLUTION RESPIRATORY (INHALATION) EVERY 6 HOURS PRN
Status: DISCONTINUED | OUTPATIENT
Start: 2024-04-25 | End: 2024-04-28 | Stop reason: HOSPADM

## 2024-04-25 RX ORDER — SUMATRIPTAN 50 MG/1
50 TABLET, FILM COATED ORAL
Status: DISCONTINUED | OUTPATIENT
Start: 2024-04-25 | End: 2024-04-25

## 2024-04-25 RX ORDER — HYDROXYZINE HYDROCHLORIDE 25 MG/1
25 TABLET, FILM COATED ORAL 3 TIMES DAILY PRN
Status: DISCONTINUED | OUTPATIENT
Start: 2024-04-25 | End: 2024-04-26

## 2024-04-25 RX ORDER — ZOLPIDEM TARTRATE 5 MG/1
10 TABLET ORAL NIGHTLY PRN
Status: DISCONTINUED | OUTPATIENT
Start: 2024-04-25 | End: 2024-04-26

## 2024-04-25 RX ORDER — TOPIRAMATE 25 MG/1
50 TABLET ORAL DAILY
Status: DISCONTINUED | OUTPATIENT
Start: 2024-04-26 | End: 2024-04-28 | Stop reason: HOSPADM

## 2024-04-25 RX ORDER — ALBUTEROL SULFATE 90 UG/1
2 AEROSOL, METERED RESPIRATORY (INHALATION) EVERY 4 HOURS PRN
COMMUNITY

## 2024-04-25 RX ORDER — GABAPENTIN 300 MG/1
600 CAPSULE ORAL EVERY 8 HOURS SCHEDULED
Status: DISCONTINUED | OUTPATIENT
Start: 2024-04-25 | End: 2024-04-26

## 2024-04-25 RX ORDER — CLOPIDOGREL BISULFATE 75 MG/1
75 TABLET ORAL DAILY
Status: DISCONTINUED | OUTPATIENT
Start: 2024-04-26 | End: 2024-04-28 | Stop reason: HOSPADM

## 2024-04-25 RX ORDER — FLUOXETINE HYDROCHLORIDE 20 MG/1
40 CAPSULE ORAL DAILY
Status: DISCONTINUED | OUTPATIENT
Start: 2024-04-26 | End: 2024-04-26

## 2024-04-25 RX ORDER — HYDROCODONE BITARTRATE AND ACETAMINOPHEN 10; 325 MG/1; MG/1
1 TABLET ORAL EVERY 8 HOURS PRN
Status: DISCONTINUED | OUTPATIENT
Start: 2024-04-25 | End: 2024-04-26

## 2024-04-25 RX ORDER — BISACODYL 10 MG
10 SUPPOSITORY, RECTAL RECTAL DAILY PRN
Status: DISCONTINUED | OUTPATIENT
Start: 2024-04-25 | End: 2024-04-28 | Stop reason: HOSPADM

## 2024-04-25 RX ORDER — SODIUM CHLORIDE 0.9 % (FLUSH) 0.9 %
10 SYRINGE (ML) INJECTION EVERY 12 HOURS SCHEDULED
Status: DISCONTINUED | OUTPATIENT
Start: 2024-04-25 | End: 2024-04-28 | Stop reason: HOSPADM

## 2024-04-25 RX ORDER — SODIUM CHLORIDE 0.9 % (FLUSH) 0.9 %
10 SYRINGE (ML) INJECTION AS NEEDED
Status: DISCONTINUED | OUTPATIENT
Start: 2024-04-25 | End: 2024-04-28 | Stop reason: HOSPADM

## 2024-04-25 RX ORDER — ISOSORBIDE MONONITRATE 30 MG/1
30 TABLET, EXTENDED RELEASE ORAL DAILY
Status: DISCONTINUED | OUTPATIENT
Start: 2024-04-26 | End: 2024-04-28 | Stop reason: HOSPADM

## 2024-04-25 RX ORDER — PANTOPRAZOLE SODIUM 40 MG/1
40 TABLET, DELAYED RELEASE ORAL 2 TIMES DAILY
Status: DISCONTINUED | OUTPATIENT
Start: 2024-04-25 | End: 2024-04-28 | Stop reason: HOSPADM

## 2024-04-25 RX ORDER — POTASSIUM CHLORIDE 750 MG/1
40 CAPSULE, EXTENDED RELEASE ORAL
Status: DISCONTINUED | OUTPATIENT
Start: 2024-04-25 | End: 2024-04-25

## 2024-04-25 RX ORDER — AMOXICILLIN 250 MG
2 CAPSULE ORAL 2 TIMES DAILY
Status: DISCONTINUED | OUTPATIENT
Start: 2024-04-25 | End: 2024-04-28 | Stop reason: HOSPADM

## 2024-04-25 RX ORDER — SPIRONOLACTONE 25 MG/1
25 TABLET ORAL DAILY
Status: DISCONTINUED | OUTPATIENT
Start: 2024-04-26 | End: 2024-04-25

## 2024-04-25 RX ORDER — ASPIRIN 81 MG/1
81 TABLET ORAL DAILY
Status: DISCONTINUED | OUTPATIENT
Start: 2024-04-26 | End: 2024-04-28 | Stop reason: HOSPADM

## 2024-04-25 RX ORDER — NITROGLYCERIN 0.4 MG/1
0.4 TABLET SUBLINGUAL
Status: DISCONTINUED | OUTPATIENT
Start: 2024-04-25 | End: 2024-04-27 | Stop reason: SDUPTHER

## 2024-04-25 RX ORDER — SACUBITRIL AND VALSARTAN 24; 26 MG/1; MG/1
1 TABLET, FILM COATED ORAL 2 TIMES DAILY
Status: ON HOLD | COMMUNITY
End: 2024-04-28

## 2024-04-25 RX ORDER — TIZANIDINE 4 MG/1
4 TABLET ORAL EVERY 8 HOURS PRN
Status: DISCONTINUED | OUTPATIENT
Start: 2024-04-25 | End: 2024-04-26

## 2024-04-25 RX ADMIN — ZOLPIDEM TARTRATE 10 MG: 5 TABLET ORAL at 22:12

## 2024-04-25 RX ADMIN — GABAPENTIN 600 MG: 300 CAPSULE ORAL at 22:12

## 2024-04-25 RX ADMIN — SACUBITRIL AND VALSARTAN 1 TABLET: 24; 26 TABLET, FILM COATED ORAL at 22:12

## 2024-04-25 RX ADMIN — NICOTINE 1 PATCH: 21 PATCH, EXTENDED RELEASE TRANSDERMAL at 22:11

## 2024-04-25 RX ADMIN — DOCUSATE SODIUM AND SENNOSIDES 2 TABLET: 8.6; 5 TABLET, FILM COATED ORAL at 22:12

## 2024-04-25 RX ADMIN — Medication 10 ML: at 22:13

## 2024-04-25 RX ADMIN — POTASSIUM CHLORIDE 40 MEQ: 750 CAPSULE, EXTENDED RELEASE ORAL at 22:11

## 2024-04-25 RX ADMIN — HYDROCODONE BITARTRATE AND ACETAMINOPHEN 1 TABLET: 10; 325 TABLET ORAL at 22:12

## 2024-04-25 RX ADMIN — PANTOPRAZOLE SODIUM 40 MG: 40 TABLET, DELAYED RELEASE ORAL at 22:12

## 2024-04-25 RX ADMIN — ATORVASTATIN CALCIUM 40 MG: 40 TABLET, FILM COATED ORAL at 22:12

## 2024-04-25 RX ADMIN — RANOLAZINE 500 MG: 500 TABLET, FILM COATED, EXTENDED RELEASE ORAL at 22:12

## 2024-04-25 RX ADMIN — ENOXAPARIN SODIUM 40 MG: 100 INJECTION SUBCUTANEOUS at 23:14

## 2024-04-25 RX ADMIN — TIZANIDINE 4 MG: 4 TABLET ORAL at 22:13

## 2024-04-25 RX ADMIN — IOPAMIDOL 76 ML: 755 INJECTION, SOLUTION INTRAVENOUS at 22:45

## 2024-04-25 RX ADMIN — CARVEDILOL 6.25 MG: 6.25 TABLET, FILM COATED ORAL at 22:12

## 2024-04-25 NOTE — Clinical Note
First balloon inflation max pressure = 12 tere. First balloon inflation duration = 12 seconds. Second inflation of balloon - Max pressure = 12 tere. 2nd Inflation of balloon - Duration = 12 seconds.

## 2024-04-25 NOTE — Clinical Note
First balloon inflation max pressure = 12 tere. First balloon inflation duration = 30 seconds. Second inflation of balloon - Max pressure = 12 tere. 2nd Inflation of balloon - Duration = 20 seconds. 2nd inflation was done at 10:38 CDT. Third inflation of balloon - Max pressure = 12 tere. 3rd Inflation of balloon - Duration = 20 seconds. 3rd inflation was done at 10:38 CDT.

## 2024-04-25 NOTE — Clinical Note
A 6 fr sheath was successfully inserted with ultrasound guidance into the right femoral artery. Sheath insertion not delayed.

## 2024-04-25 NOTE — LETTER
April 28, 2024     Patient: Alannah Barr   YOB: 1965   Date of Visit: 4/25/2024       To Whom It May Concern:    It is my medical opinion that Alannah Barr {Work release (duty restriction):83835}.           Sincerely,        No name on file    CC:   No Recipients

## 2024-04-26 ENCOUNTER — APPOINTMENT (OUTPATIENT)
Dept: CARDIOLOGY | Facility: HOSPITAL | Age: 59
End: 2024-04-26
Payer: COMMERCIAL

## 2024-04-26 LAB
ANION GAP SERPL CALCULATED.3IONS-SCNC: 10 MMOL/L (ref 5–15)
BH CV REST NUCLEAR ISOTOPE DOSE: 11 MCI
BH CV STRESS BP STAGE 1: NORMAL
BH CV STRESS COMMENTS STAGE 1: NORMAL
BH CV STRESS DOSE REGADENOSON STAGE 1: 0.4
BH CV STRESS DURATION MIN STAGE 1: 0
BH CV STRESS DURATION SEC STAGE 1: 10
BH CV STRESS HR STAGE 1: 75
BH CV STRESS NUCLEAR ISOTOPE DOSE: 30.8 MCI
BH CV STRESS PROTOCOL 1: NORMAL
BH CV STRESS RECOVERY BP: NORMAL MMHG
BH CV STRESS RECOVERY HR: 75 BPM
BH CV STRESS STAGE 1: 1
BUN SERPL-MCNC: 8 MG/DL (ref 6–20)
BUN/CREAT SERPL: 11 (ref 7–25)
CALCIUM SPEC-SCNC: 9.1 MG/DL (ref 8.6–10.5)
CHLORIDE SERPL-SCNC: 105 MMOL/L (ref 98–107)
CO2 SERPL-SCNC: 27 MMOL/L (ref 22–29)
CREAT SERPL-MCNC: 0.73 MG/DL (ref 0.57–1)
DEPRECATED RDW RBC AUTO: 50 FL (ref 37–54)
EGFRCR SERPLBLD CKD-EPI 2021: 96.7 ML/MIN/1.73
ERYTHROCYTE [DISTWIDTH] IN BLOOD BY AUTOMATED COUNT: 15.2 % (ref 12.3–15.4)
GEN 5 2HR TROPONIN T REFLEX: 13 NG/L
GLUCOSE BLDC GLUCOMTR-MCNC: 138 MG/DL (ref 70–130)
GLUCOSE SERPL-MCNC: 179 MG/DL (ref 65–99)
HCT VFR BLD AUTO: 37.5 % (ref 34–46.6)
HGB BLD-MCNC: 11.6 G/DL (ref 12–15.9)
LV EF NUC BP: 46 %
MAGNESIUM SERPL-MCNC: 1.8 MG/DL (ref 1.6–2.6)
MAXIMAL PREDICTED HEART RATE: 164 BPM
MCH RBC QN AUTO: 27.8 PG (ref 26.6–33)
MCHC RBC AUTO-ENTMCNC: 30.9 G/DL (ref 31.5–35.7)
MCV RBC AUTO: 89.7 FL (ref 79–97)
PERCENT MAX PREDICTED HR: 50 %
PLATELET # BLD AUTO: 312 10*3/MM3 (ref 140–450)
PMV BLD AUTO: 10.5 FL (ref 6–12)
POTASSIUM SERPL-SCNC: 3.9 MMOL/L (ref 3.5–5.2)
RBC # BLD AUTO: 4.18 10*6/MM3 (ref 3.77–5.28)
SODIUM SERPL-SCNC: 142 MMOL/L (ref 136–145)
STRESS BASELINE BP: NORMAL MMHG
STRESS BASELINE HR: 66 BPM
STRESS PERCENT HR: 59 %
STRESS POST EXERCISE DUR SEC: 10 SEC
STRESS POST PEAK BP: NORMAL MMHG
STRESS POST PEAK HR: 82 BPM
STRESS TARGET HR: 139 BPM
TROPONIN T DELTA: 1 NG/L
WBC NRBC COR # BLD AUTO: 8.53 10*3/MM3 (ref 3.4–10.8)

## 2024-04-26 PROCEDURE — 25010000002 ENOXAPARIN PER 10 MG: Performed by: NURSE PRACTITIONER

## 2024-04-26 PROCEDURE — 82948 REAGENT STRIP/BLOOD GLUCOSE: CPT

## 2024-04-26 PROCEDURE — 25810000003 SODIUM CHLORIDE 0.9 % SOLUTION: Performed by: INTERNAL MEDICINE

## 2024-04-26 PROCEDURE — 96375 TX/PRO/DX INJ NEW DRUG ADDON: CPT

## 2024-04-26 PROCEDURE — 0 TECHNETIUM TETROFOSMIN KIT: Performed by: INTERNAL MEDICINE

## 2024-04-26 PROCEDURE — G0378 HOSPITAL OBSERVATION PER HR: HCPCS

## 2024-04-26 PROCEDURE — A9502 TC99M TETROFOSMIN: HCPCS | Performed by: INTERNAL MEDICINE

## 2024-04-26 PROCEDURE — 80048 BASIC METABOLIC PNL TOTAL CA: CPT | Performed by: INTERNAL MEDICINE

## 2024-04-26 PROCEDURE — 25010000002 DIPHENHYDRAMINE PER 50 MG: Performed by: INTERNAL MEDICINE

## 2024-04-26 PROCEDURE — 94799 UNLISTED PULMONARY SVC/PX: CPT

## 2024-04-26 PROCEDURE — 85027 COMPLETE CBC AUTOMATED: CPT | Performed by: NURSE PRACTITIONER

## 2024-04-26 PROCEDURE — 93005 ELECTROCARDIOGRAM TRACING: CPT | Performed by: INTERNAL MEDICINE

## 2024-04-26 PROCEDURE — 94640 AIRWAY INHALATION TREATMENT: CPT

## 2024-04-26 PROCEDURE — 96372 THER/PROPH/DIAG INJ SC/IM: CPT

## 2024-04-26 PROCEDURE — 84484 ASSAY OF TROPONIN QUANT: CPT | Performed by: INTERNAL MEDICINE

## 2024-04-26 PROCEDURE — 78452 HT MUSCLE IMAGE SPECT MULT: CPT

## 2024-04-26 PROCEDURE — 25010000002 AMINOPHYLLINE PER 250 MG: Performed by: INTERNAL MEDICINE

## 2024-04-26 PROCEDURE — 93018 CV STRESS TEST I&R ONLY: CPT | Performed by: INTERNAL MEDICINE

## 2024-04-26 PROCEDURE — 83735 ASSAY OF MAGNESIUM: CPT | Performed by: INTERNAL MEDICINE

## 2024-04-26 PROCEDURE — 78452 HT MUSCLE IMAGE SPECT MULT: CPT | Performed by: INTERNAL MEDICINE

## 2024-04-26 PROCEDURE — 93017 CV STRESS TEST TRACING ONLY: CPT

## 2024-04-26 PROCEDURE — 25010000002 HALOPERIDOL LACTATE PER 5 MG: Performed by: INTERNAL MEDICINE

## 2024-04-26 PROCEDURE — 96374 THER/PROPH/DIAG INJ IV PUSH: CPT

## 2024-04-26 PROCEDURE — 25010000002 REGADENOSON 0.4 MG/5ML SOLUTION: Performed by: INTERNAL MEDICINE

## 2024-04-26 PROCEDURE — 93005 ELECTROCARDIOGRAM TRACING: CPT | Performed by: NURSE PRACTITIONER

## 2024-04-26 RX ORDER — SODIUM CHLORIDE 9 MG/ML
100 INJECTION, SOLUTION INTRAVENOUS CONTINUOUS
Status: DISPENSED | OUTPATIENT
Start: 2024-04-26 | End: 2024-04-26

## 2024-04-26 RX ORDER — HALOPERIDOL 5 MG/ML
5 INJECTION INTRAMUSCULAR EVERY 6 HOURS PRN
Status: DISCONTINUED | OUTPATIENT
Start: 2024-04-26 | End: 2024-04-26

## 2024-04-26 RX ORDER — DIPHENHYDRAMINE HYDROCHLORIDE 50 MG/ML
25 INJECTION INTRAMUSCULAR; INTRAVENOUS EVERY 6 HOURS PRN
Status: COMPLETED | OUTPATIENT
Start: 2024-04-26 | End: 2024-04-26

## 2024-04-26 RX ORDER — ATORVASTATIN CALCIUM 40 MG/1
40 TABLET, FILM COATED ORAL NIGHTLY
COMMUNITY
End: 2024-04-28 | Stop reason: HOSPADM

## 2024-04-26 RX ORDER — ERGOCALCIFEROL 1.25 MG/1
50000 CAPSULE ORAL WEEKLY
COMMUNITY

## 2024-04-26 RX ORDER — NALOXONE HYDROCHLORIDE 0.4 MG/ML
INJECTION, SOLUTION INTRAMUSCULAR; INTRAVENOUS; SUBCUTANEOUS
Status: DISPENSED
Start: 2024-04-26 | End: 2024-04-27

## 2024-04-26 RX ORDER — AMINOPHYLLINE 25 MG/ML
50 INJECTION, SOLUTION INTRAVENOUS ONCE
Status: COMPLETED | OUTPATIENT
Start: 2024-04-26 | End: 2024-04-26

## 2024-04-26 RX ORDER — SPIRONOLACTONE 25 MG/1
25 TABLET ORAL DAILY
Status: ON HOLD | COMMUNITY
End: 2024-04-28

## 2024-04-26 RX ORDER — CARVEDILOL 6.25 MG/1
6.25 TABLET ORAL 2 TIMES DAILY WITH MEALS
Status: ON HOLD | COMMUNITY
End: 2024-04-28

## 2024-04-26 RX ORDER — HYDROCODONE BITARTRATE AND ACETAMINOPHEN 10; 325 MG/1; MG/1
1 TABLET ORAL EVERY 6 HOURS PRN
Status: DISCONTINUED | OUTPATIENT
Start: 2024-04-26 | End: 2024-04-26

## 2024-04-26 RX ORDER — REGADENOSON 0.08 MG/ML
0.4 INJECTION, SOLUTION INTRAVENOUS ONCE
Status: COMPLETED | OUTPATIENT
Start: 2024-04-26 | End: 2024-04-26

## 2024-04-26 RX ADMIN — TETROFOSMIN 1 DOSE: 1.38 INJECTION, POWDER, LYOPHILIZED, FOR SOLUTION INTRAVENOUS at 15:43

## 2024-04-26 RX ADMIN — HYDROCODONE BITARTRATE AND ACETAMINOPHEN 1 TABLET: 10; 325 TABLET ORAL at 06:16

## 2024-04-26 RX ADMIN — Medication 10 ML: at 21:33

## 2024-04-26 RX ADMIN — TOPIRAMATE 50 MG: 25 TABLET, FILM COATED ORAL at 10:38

## 2024-04-26 RX ADMIN — TETROFOSMIN 1 DOSE: 1.38 INJECTION, POWDER, LYOPHILIZED, FOR SOLUTION INTRAVENOUS at 10:12

## 2024-04-26 RX ADMIN — ALBUTEROL SULFATE 2.5 MG: 2.5 SOLUTION RESPIRATORY (INHALATION) at 23:18

## 2024-04-26 RX ADMIN — PANTOPRAZOLE SODIUM 40 MG: 40 TABLET, DELAYED RELEASE ORAL at 10:38

## 2024-04-26 RX ADMIN — FUROSEMIDE 40 MG: 40 TABLET ORAL at 10:37

## 2024-04-26 RX ADMIN — HYDROCODONE BITARTRATE AND ACETAMINOPHEN 1 TABLET: 10; 325 TABLET ORAL at 13:19

## 2024-04-26 RX ADMIN — NITROGLYCERIN 0.4 MG: 0.4 TABLET SUBLINGUAL at 12:35

## 2024-04-26 RX ADMIN — DOCUSATE SODIUM AND SENNOSIDES 2 TABLET: 8.6; 5 TABLET, FILM COATED ORAL at 21:33

## 2024-04-26 RX ADMIN — GABAPENTIN 600 MG: 300 CAPSULE ORAL at 05:53

## 2024-04-26 RX ADMIN — CLOPIDOGREL BISULFATE 75 MG: 75 TABLET, FILM COATED ORAL at 10:38

## 2024-04-26 RX ADMIN — HYDROXYZINE HYDROCHLORIDE 25 MG: 25 TABLET ORAL at 13:10

## 2024-04-26 RX ADMIN — SACUBITRIL AND VALSARTAN 1 TABLET: 24; 26 TABLET, FILM COATED ORAL at 10:38

## 2024-04-26 RX ADMIN — REGADENOSON 0.4 MG: 0.08 INJECTION, SOLUTION INTRAVENOUS at 11:59

## 2024-04-26 RX ADMIN — PANTOPRAZOLE SODIUM 40 MG: 40 TABLET, DELAYED RELEASE ORAL at 21:33

## 2024-04-26 RX ADMIN — RANOLAZINE 500 MG: 500 TABLET, FILM COATED, EXTENDED RELEASE ORAL at 10:38

## 2024-04-26 RX ADMIN — ASPIRIN 81 MG: 81 TABLET, COATED ORAL at 10:38

## 2024-04-26 RX ADMIN — ENOXAPARIN SODIUM 40 MG: 100 INJECTION SUBCUTANEOUS at 21:33

## 2024-04-26 RX ADMIN — SODIUM CHLORIDE 100 ML/HR: 9 INJECTION, SOLUTION INTRAVENOUS at 20:30

## 2024-04-26 RX ADMIN — RANOLAZINE 500 MG: 500 TABLET, FILM COATED, EXTENDED RELEASE ORAL at 21:33

## 2024-04-26 RX ADMIN — AMINOPHYLLINE 150 MG: 25 INJECTION, SOLUTION INTRAVENOUS at 12:09

## 2024-04-26 RX ADMIN — GABAPENTIN 600 MG: 300 CAPSULE ORAL at 13:19

## 2024-04-26 RX ADMIN — Medication 10 ML: at 10:40

## 2024-04-26 RX ADMIN — CARVEDILOL 6.25 MG: 6.25 TABLET, FILM COATED ORAL at 10:38

## 2024-04-26 RX ADMIN — DIPHENHYDRAMINE HYDROCHLORIDE 25 MG: 50 INJECTION, SOLUTION INTRAMUSCULAR; INTRAVENOUS at 22:22

## 2024-04-26 RX ADMIN — HALOPERIDOL LACTATE 5 MG: 5 INJECTION, SOLUTION INTRAMUSCULAR at 17:44

## 2024-04-26 RX ADMIN — ISOSORBIDE MONONITRATE 30 MG: 30 TABLET, EXTENDED RELEASE ORAL at 10:38

## 2024-04-26 RX ADMIN — CARVEDILOL 6.25 MG: 6.25 TABLET, FILM COATED ORAL at 17:12

## 2024-04-26 RX ADMIN — SODIUM CHLORIDE 500 ML: 9 INJECTION, SOLUTION INTRAVENOUS at 21:34

## 2024-04-26 RX ADMIN — ATORVASTATIN CALCIUM 40 MG: 40 TABLET, FILM COATED ORAL at 21:33

## 2024-04-26 RX ADMIN — SACUBITRIL AND VALSARTAN 1 TABLET: 24; 26 TABLET, FILM COATED ORAL at 21:33

## 2024-04-26 NOTE — H&P
"    [facility] Medicine Services  HISTORY AND PHYSICAL    Date of Admission: 4/25/2024  Primary Care Physician: Coy Guillermo MD    Subjective   Primary Historian: Patient    Chief Complaint: Chest pain and shortness of breath    History of Present Illness  Vicki Barr is a 58-year-old female with a history of congestive heart failure, most recent echocardiogram performed Evan/20 9/2023 revealed an ejection fraction of 51-55.  Chronic pain syndrome, COPD, tobacco dependency.  Patient recently had two angiographies in 2023, initial 1 performed in July due to NSTEMI and additional Scheduled angiography in August 2023, see below for cath catheterization and interventions.  Patient presented to the ED 9/2023 with atypical chest pain, negative workup in ED and discharged home.  Patient has been transferred today from [facility] with complaints of chest pain, negative troponins, BNP of 1406, D-dimer of 1200.    Upon admission to [facility] cardiac telemetry patient has no complaints of chest pain at this time, hypertensive at 174/82, afebrile, 97% on room air.  Patient states that she has had intermittent chest pain since her initial non-STEMI in July 2023 however in the last 24 hours she has had increasing pressure, states that it is \"elephant sitting on her chest\", left arm pain, with nausea, and increased shortness of breath.  Additionally states that she has had increasing shortness of breath over the last 1 to 2 weeks.  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  Initially did not seek treatment as she thought that she may be getting pneumonia.  She states she additionally started having a nonproductive cough in the morning, with nonproductive sputum.  Patient has no " complaints of febrile episodes, palpitations, vomiting, or diarrhea.  Patient is very tearful during exam and is having obvious issues with increased stressed due to recent loss of job due to her illness, and then starting a new job which she is afraid she will use due to her condition.  Patient has multiple scabs all over her arms and legs, which she states is due to scratching from anxiety.  She additionally does not want condition discussed with her daughter, as she would to keep her condition to herself at this time.  When discussing medications, patient states that when she lost her insurance she did not take any of her cardiac medications nor her Plavix for approximately 2 months.  Has been on confocal medication regimen since the end of November.  Patient has decreased her smoking to 1/2 PPD, from 1 PPD  She initially began cardiac rehab but had to discontinue due to insurance.  However she is can try to keep up her exercise regimen at home.  Patient is weight aware that at this time her cardiac workup is nonacute, and if no additional chest pain or EKG changes, patient will have a Lexiscan stress in the a.m. Additional interventions to be evaluated post new lab work, CTA of the chest, and EKG.  Time patient will be admitted for continued evaluation and treatment.    Review of Systems   Otherwise complete ROS reviewed and negative except as mentioned in the HPI.    Past Medical History:   Past Medical History:   Diagnosis Date    CHF (congestive heart failure)     Chronic pain syndrome     COPD (chronic obstructive pulmonary disease)     Tobacco dependence      Past Surgical History:  Past Surgical History:   Procedure Laterality Date    BREAST BIOPSY Right     CARDIAC CATHETERIZATION N/A 07/29/2023    Procedure: Left Heart Cath;  Surgeon: Osman Patrick DO;  Location:  [facility] CATH INVASIVE LOCATION;  Service: Cardiovascular;  Laterality: N/A;    CARDIAC CATHETERIZATION N/A 08/17/2023    Procedure:  Percutaneous Coronary Intervention;  Surgeon: Osman Patrick DO;  Location:  [facility] CATH INVASIVE LOCATION;  Service: Cardiovascular;  Laterality: N/A;    CHOLECYSTECTOMY      CORONARY STENT PLACEMENT  2023    X 2    HYSTERECTOMY      KNEE ARTHROSCOPY W/ MEDIAL COLLATERAL LIGAMENT (MCL) REPAIR Right     x 2    TONSILLECTOMY       Social History:  reports that she has been smoking cigarettes. She has a 51 pack-year smoking history. She has never used smokeless tobacco. She reports that she does not currently use alcohol. She reports that she does not currently use drugs.    Family History: family history includes COPD in her mother; Diabetes in her father; Heart failure in her father.       Allergies:  Allergies   Allergen Reactions    Levaquin [Levofloxacin] Shortness Of Breath    Morphine Shortness Of Breath    Codeine Unknown - High Severity    Doxycycline Unknown - High Severity    Metronidazole Unknown - High Severity    Naproxen Unknown - High Severity    Propoxyphene Unknown - High Severity    Tetanus Toxoids Unknown - High Severity    Tetracyclines & Related Unknown - High Severity       Medications:  Prior to Admission medications    Medication Sig Start Date End Date Taking? Authorizing Provider   aspirin 81 MG EC tablet Take 1 tablet by mouth Daily. 7/30/23   Win Rajput DO   atorvastatin (LIPITOR) 40 MG tablet TAKE 1 TABLET BY MOUTH ONCE NIGHTLY 1/4/24   Osman Patrick DO   carvedilol (COREG) 6.25 MG tablet TAKE 1 TABLET BY MOUTH 2 TIMES A DAY WITH MEALS 11/20/23   Osman Patrick DO   clopidogrel (PLAVIX) 75 MG tablet Take 1 tablet by mouth Daily. 7/31/23   Win Rajput DO   FLUoxetine (PROzac) 20 MG capsule Take 2 capsules by mouth Daily.    ProviderZoraida MD   furosemide (LASIX) 40 MG tablet Take 1 tablet by mouth Daily As Needed.    Zoraida Avalos MD   gabapentin (NEURONTIN) 600 MG tablet Take 1 tablet by mouth 3 (Three) Times a Day.     Zoraida Avalos MD   HYDROcodone-acetaminophen (NORCO)  MG per tablet Take 1 tablet by mouth Every 8 (Eight) Hours As Needed for Moderate Pain.    Zoraida Avalos MD   isosorbide mononitrate (IMDUR) 30 MG 24 hr tablet Take 1 tablet by mouth Daily. 8/18/23   Osman Patrick DO   meloxicam (MOBIC) 15 MG tablet Take 1 tablet by mouth Daily.    Zoraida Avalos MD   pantoprazole (PROTONIX) 40 MG EC tablet Take 1 tablet by mouth 2 (Two) Times a Day.    Zoraida Avalos MD   ranolazine (Ranexa) 500 MG 12 hr tablet Take 1 tablet by mouth 2 (Two) Times a Day. 8/10/23   Osman Patrick DO   spironolactone (ALDACTONE) 25 MG tablet TAKE 1 TABLET BY MOUTH ONCE DAILY 1/4/24   Osman Patrick DO   SUMAtriptan (IMITREX) 50 MG tablet Take 1 tablet by mouth Every 2 (Two) Hours As Needed for Migraine. Take one tablet at onset of headache. May repeat dose one time in 2 hours if headache not relieved.    Zoraida Avalos MD   tiZANidine (ZANAFLEX) 4 MG tablet Take 1 tablet by mouth Every 8 (Eight) Hours As Needed for Muscle Spasms.    Zoraida Avalos MD   topiramate (TOPAMAX) 50 MG tablet Take 1 tablet by mouth Daily.    Zoraida Avalos MD   zolpidem (AMBIEN) 10 MG tablet Take 1 tablet by mouth At Night As Needed for Sleep.    Zoraida Avalos MD     I have utilized all available immediate resources to obtain, update, or review the patient's current medications (including all prescriptions, over-the-counter products, herbals, cannabis/cannabidiol products, and vitamin/mineral/dietary (nutritional) supplements).  Cardiac angiography 7/29/2023  Impression:  1. Coronary artery disease as described above including significant lesions in the left circumflex, large third obtuse marginal branch, and mid LAD. Patient underwent intervention to her left circumflex and obtuse marginal branches today. The LAD was left for the future and will need to be staged  "PCI.  2. History of congestive heart failure  3. History of COPD  4. nicotine abuse    Cardiac angiography 8/17/2023  Left main: Left main is a large-caliber vessel that bifurcates into the LAD and left circumflex coronary arteries, no angiographic evidence of stenosis, ALEXY-3 flow    LAD: The LAD is a large-caliber vessel with mild disease in the proximal segment, 70 to 80% stenosis in the midsegment,, the remainder the vessel has mild luminal irregularities, ALEXY-3 flow. Status post successful PTCA and PCI x1 stent we had continuation ALEXY-3 flow and no residual stenosis remaining in the mid vessel.    Diagonals: First diagonal is a large-caliber vessel. Status post PCI to the LAD we had plaque shift that resulted and 70% stenosis at the ostium. Status post successful PTCA we had continuation ALEXY-3 flow and no residual stenosis remaining    Left circumflex: Circumflex is a large-caliber vessel with patent stents present in the mid and distal segments with no in-stent stenosis appreciated, ALEXY-3 flow    Obtuse marginals: First OM is small caliber, the second OM is moderate caliber with mild disease, the third OM is large caliber with a patent stent present in the proximal and mid portion of the vessel with approximately 80 to 90% stenosis at the ostium. Status post successful high inflation NC PTCA we had continuation of ALEXY-3 flow and approximately 20 to 30% stenosis remaining at the ostium.     Objective     Vital Signs: /82 (BP Location: Right arm, Patient Position: Sitting)   Pulse 74   Temp 98.3 °F (36.8 °C) (Oral)   Resp 17   Ht 152.4 cm (60\")   Wt 69.1 kg (152 lb 6.4 oz)   SpO2 97%   BMI 29.76 kg/m²   Physical Exam  Constitutional:       Appearance: She is ill-appearing.   HENT:      Head: Normocephalic.      Nose: Nose normal. No congestion or rhinorrhea.   Eyes:      Pupils: Pupils are equal, round, and reactive to light.   Cardiovascular:      Rate and Rhythm: Normal rate and regular " rhythm.      Pulses: Normal pulses.   Musculoskeletal:      Cervical back: Normal range of motion.      Right lower leg: No edema.      Left lower leg: No edema.   Skin:     General: Skin is warm.      Capillary Refill: Capillary refill takes less than 2 seconds.      Coloration: Skin is pale.   Neurological:      General: No focal deficit present.      Mental Status: She is alert and oriented to person, place, and time.   Psychiatric:         Attention and Perception: She is inattentive.         Mood and Affect: Mood is anxious. Affect is tearful.         Speech: Speech normal.         Behavior: Behavior normal.         Thought Content: Thought content normal. Thought content does not include suicidal ideation.         Cognition and Memory: Cognition normal.      Comments: Emotional and Tangentile        Results Reviewed:  Lab Results (last 24 hours)       Procedure Component Value Units Date/Time    Procalcitonin [[id number]]  (Normal) Collected: 04/25/24 2059    Specimen: Blood Updated: 04/25/24 2131     Procalcitonin 0.04 ng/mL     Lipid Panel [[id number]] Collected: 04/25/24 2059    Specimen: Blood Updated: 04/25/24 2131    Hemoglobin A1c [[id number]] Collected: 04/25/24 2059    Specimen: Blood Updated: 04/25/24 2130    High Sensitivity Troponin T [[id number]]  (Normal) Collected: 04/25/24 2059    Specimen: Blood Updated: 04/25/24 2126     HS Troponin T 12 ng/L     Basic Metabolic Panel [[id number]]  (Abnormal) Collected: 04/25/24 2059    Specimen: Blood Updated: 04/25/24 2126     Glucose 103 mg/dL      BUN 8 mg/dL      Creatinine 0.66 mg/dL      Sodium 142 mmol/L      Potassium 3.2 mmol/L      Chloride 106 mmol/L      CO2 25.0 mmol/L      Calcium 9.0 mg/dL      BUN/Creatinine Ratio 12.1     Anion Gap 11.0 mmol/L      eGFR 103.1 mL/min/1.73     CBC & Differential [[id number]]  (Abnormal) Collected: 04/25/24 2059    Specimen: Blood Updated: 04/25/24 2108    CBC Auto Differential [[id number]]  (Abnormal) Collected:  04/25/24 2059    Specimen: Blood Updated: 04/25/24 2108     WBC 8.21 10*3/mm3      RBC 4.01 10*6/mm3      Hemoglobin 11.1 g/dL      Hematocrit 35.3 %      MCV 88.0 fL      MCH 27.7 pg      MCHC 31.4 g/dL      RDW 15.1 %      RDW-SD 48.8 fl      MPV 10.3 fL      Platelets 288 10*3/mm3      Neutrophil % 34.9 %      Lymphocyte % 47.5 %      Monocyte % 11.2 %      Eosinophil % 5.7 %      Basophil % 0.6 %      Immature Grans % 0.1 %      Neutrophils, Absolute 2.86 10*3/mm3      Lymphocytes, Absolute 3.90 10*3/mm3      Monocytes, Absolute 0.92 10*3/mm3      Eosinophils, Absolute 0.47 10*3/mm3      Basophils, Absolute 0.05 10*3/mm3      Immature Grans, Absolute 0.01 10*3/mm3      nRBC 0.0 /100 WBC           Imaging Results (Last 24 Hours)       ** No results found for the last 24 hours. **              Assessment / Plan   Assessment:   Active Hospital Problems    Diagnosis     **Chest pain     Chronic diastolic CHF (congestive heart failure)     Elevated d-dimer     Anxiety disorder     Coronary artery disease of native artery of native heart with stable angina pectoris     Chronic pain syndrome     COPD (chronic obstructive pulmonary disease)     Tobacco dependence        Treatment Plan  The patient will be admitted to Dr. Rajput's service here at [facility].   Chest pain/known coronary artery disease-waiting additional workup, however patient had negative troponin at outlying facility with an acute EKG.  Cardiac telemetry, stat EKG, stat troponin, Lexiscan in a.m. if no additional chest pain, EKG changes or troponin elevation overnight.  Hypertension-has been unable to take her medications today as she has been in the emergency room since this a.m.  Restart home medications, every 4 vital signs, reevaluate in the a.m.  Anxiety-patient is extremely histrionic, very stressed due to current situation.  Atarax will be initiated Scheduled for anxiety and chronic itching.  Elevated T-ckekc-C-dimer was 1200 at  [facility], awaiting results of CTA of the chest.  Patient placed on prophylactic Lovenox.  Chronic diastolic heart failure-BNP at [facility] was 1406, Lasix 40 mg IV x 1 dose.  Reevaluation of symptoms in a.m.   COPD-incentive spirometry, supplemental oxygen, ABGs as needed.  VTE prophylaxis with SCDs.  Reviewed and resumed home medications as appropriate.  Labs in AM.      Medical Decision Making  Number and Complexity of problems: 6  Differential Diagnosis: Pulmonary embolism    Conditions and Status        Condition is unchanged.     [facility] Data  External documents reviewed: [facility] reviewed  Cardiac tracing (EKG, telemetry) interpretation: Reviewed  Radiology interpretation: Reviewed  Labs reviewed: Reviewed  Any tests that were considered but not ordered: Echocardiogram     Decision rules/scores evaluated (example [id number], Wells, etc): Wells score of 3.0     Discussed with: Dr. Rajput and patient     Care Planning  Shared decision making: Dr. Rajput and patient  Code status and discussions: Full code per patient    Disposition  Social Determinants of Health that impact treatment or disposition:  consult to evaluate possible need for stents with Medicaid if patient is unable to continue in her employment.  Estimated length of stay is 1 to 2 days.     I confirmed that the patient's advanced care plan is present, code status is documented, and a surrogate decision maker is listed in the patient's medical record.     The patient's surrogate decision maker is her daughter Meche.     The patient was seen and examined by me on 4/25/2024 at 7:40 PM.    Electronically signed by YENNI Salinas, 04/25/24, 22:02 CDT.

## 2024-04-26 NOTE — NURSING NOTE
Gave patient am meds this morning.  Reminded pt that she is npo p mn for tests.  Pt refused to drink water with meds, took meds with tea.  States that she does not like water.

## 2024-04-26 NOTE — PROGRESS NOTES
[facility] Medicine Services  INPATIENT PROGRESS NOTE    Patient Name: Alannah Barr  Date of Admission: 4/25/2024  Today's Date: 04/26/24  Length of Stay: 0  Primary Care Physician: Coy Guillermo MD    Subjective   Chief Complaint: Chest pain    HPI   Patient denies chest pain at the time of my evaluation.  She is upset and irritable as she has been n.p.o. most of the day for her stress test which is pending.  She also complains of intermittent leg swelling and request Doppler ultrasound of the legs to rule out DVT.    Review of Systems   All pertinent negatives and positives are as above. All other systems have been reviewed and are negative unless otherwise stated.     Objective    Temp:  [97.4 °F (36.3 °C)-98.8 °F (37.1 °C)] 97.6 °F (36.4 °C)  Heart Rate:  [71-82] 71  Resp:  [16-20] 20  BP: (113-174)/(63-84) 113/78  Physical Exam  Constitutional:       General: She is not in acute distress.     Appearance: She is well-developed. She is not diaphoretic.   HENT:      Head: Normocephalic.   Eyes:      General: No scleral icterus.     Conjunctiva/sclera: Conjunctivae normal.      Pupils: Pupils are equal, round, and reactive to light.   Neck:      Thyroid: No thyromegaly.      Vascular: No JVD.      Trachea: No tracheal deviation.   Cardiovascular:      Rate and Rhythm: Normal rate and regular rhythm.      Heart sounds: Normal heart sounds. No murmur heard.     No friction rub. No gallop.   Pulmonary:      Effort: Pulmonary effort is normal. No respiratory distress.      Breath sounds: Normal breath sounds. No stridor. No wheezing or rales.   Chest:      Chest wall: No tenderness.   Abdominal:      General: Bowel sounds are normal. There is no distension.      Palpations: Abdomen is soft. There is no mass.      Tenderness: There is no abdominal tenderness. There is no guarding or rebound.      Hernia: No hernia is present.   Musculoskeletal:         General:  "No tenderness or deformity. Normal range of motion.      Cervical back: Normal range of motion and neck supple.      Right lower leg: No edema.      Left lower leg: No edema.   Lymphadenopathy:      Cervical: No cervical adenopathy.   Skin:     General: Skin is warm and dry.      Coloration: Skin is not pale.      Findings: No erythema or rash.   Neurological:      General: No focal deficit present.      Mental Status: She is alert and oriented to person, place, and time.      Cranial Nerves: No cranial nerve deficit.      Sensory: No sensory deficit.      Motor: No abnormal muscle tone.      Coordination: Coordination normal.   Psychiatric:         Behavior: Behavior normal.      Comments: Irritable mood.       Results Review:  I have reviewed the labs, radiology results, and diagnostic studies.    Laboratory Data:   Results from last 7 days   Lab Units 04/26/24  0009 04/25/24 2059   WBC 10*3/mm3 8.53 8.21   HEMOGLOBIN g/dL 11.6* 11.1*   HEMATOCRIT % 37.5 35.3   PLATELETS 10*3/mm3 312 288        Results from last 7 days   Lab Units 04/26/24  0009 04/25/24 2059   SODIUM mmol/L 142 142   POTASSIUM mmol/L 3.9 3.2*   CHLORIDE mmol/L 105 106   CO2 mmol/L 27.0 25.0   BUN mg/dL 8 8   CREATININE mg/dL 0.73 0.66   CALCIUM mg/dL 9.1 9.0   GLUCOSE mg/dL 179* 103*       Culture Data:   No results found for: \"BLOODCX\", \"URINECX\", \"WOUNDCX\", \"MRSACX\", \"RESPCX\", \"STOOLCX\"    Radiology Data:   Imaging Results (Last 24 Hours)       Procedure Component Value Units Date/Time    CT Angiogram Chest [[id number]] Collected: 04/26/24 0919     Updated: 04/26/24 0950    Narrative:      CT ANGIOGRAM CHEST- 4/25/2024 9:40 PM     HISTORY: chest pain transfer from outside facility w/ + d-dimer, r/o PE      COMPARISON: 7/28/2023     TOTAL DOSE LENGTH PRODUCT: 252.49 mGy.cm. Automated exposure control was  also utilized to decrease patient radiation dose.     TECHNIQUE: Axial images of the chest are performed following IV  contrast. 2D, 3D, MIPS " reconstructed images are reviewed.     FINDINGS: There are no pulmonary emboli. No thoracic aortic aneurysm or  dissection. Mild noncalcified mural plaque of the descending thoracic  aorta remains stable compared to 7/28/2023 favoring mild atherosclerotic  plaque. There is mild cardiomegaly with mild right and moderate left  coronary artery calcification. No pericardial or pleural effusion. There  is mild right hilar adenopathy on today's exam measuring up to 1.5 cm in  width. No pathologic mediastinal or axillary lymphadenopathy visualized.     Images the upper abdomen demonstrate no adrenal nodules. Cholecystectomy  clips. Accessory splenules adjacent to the tail of the pancreas.  Decompressed stomach.     There is no lobar consolidation. There are scattered somewhat  ill-defined tiny centrilobular nodules, and often seen with respiratory  bronchiolitis interstitial lung disease in the tobacco using population.  Findings are present within the upper and lower lobes, therefore a  nonspecific interstitial pneumonitis is also considered. 4 mm left lower  lobe nodule seen on series 9 image 107. 4 mm right upper lobe pulmonary  nodule image 48. 3 mm anterior left upper lobe nodule image 69. No  pneumothorax. No endobronchial lesion.     No focal aggressive regional bony lesions.       Impression:         1. No pulmonary emboli.     2. Similar noncalcified mild atherosclerotic plaque suspected of the  thoracic aorta with no aneurysm or dissection. Moderate left and mild  right coronary calcification.     3. There is mild right hilar adenopathy, likely reactive. No mediastinal  or pathologic axillary lymphadenopathy.     4. Scattered primarily centrilobular ill-defined tiny diffuse pulmonary  nodules with involvement of the upper and lower lobes. Respiratory  bronchiolitis interstitial lung disease is considered as well as  nonspecific interstitial pneumonitis. Nonemergent pulmonary consultation  may be helpful with a  follow-up CT chest in 12 months due to the  presence of small 4 mm pulmonary nodules.     Comments: A preliminary report is issued to the ER by the [facility]  radiology service. I agree with the absence of pulmonary emboli and the  lack of focal consolidation, pleural effusion, or pneumothorax. The  discussion of the scattered tiny ill-defined centrilobular pulmonary  nodules not included on this preliminary report.     This report was signed and finalized on 4/26/2024 9:47 AM by Dr. Dayana Nielsen MD.             I have reviewed the patient's current medications.     Assessment/Plan   Assessment  Active Hospital Problems    Diagnosis     **Chest pain     Chronic diastolic CHF (congestive heart failure)     Elevated d-dimer     Anxiety disorder     Coronary artery disease of native artery of native heart with stable angina pectoris     Chronic pain syndrome     COPD (chronic obstructive pulmonary disease)     Tobacco dependence      Treatment Plan  Patient appears very anxious and is irritable.  She was found to be agitated later today and wandering the halls.  Will start IV Haldol as needed for agitation.    Stress test was a high-risk study.  Will have cardiology consultation for further recommendations.  Continue aspirin, Plavix, Coreg, atorvastatin, Imdur, Entresto    Patient requested Doppler ultrasound of the legs to rule out DVT.    DVT prophylaxis with subcutaneous Lovenox    CODE STATUS is full code    Medical Decision Making  Number and Complexity of problems: 6  Differential Diagnosis: None    Conditions and Status        Condition is unchanged.     [facility] Data  External documents reviewed: None  Cardiac tracing (EKG, telemetry) interpretation: EKG reviewed by me.  Normal sinus rhythm  Radiology interpretation: CTA of the chest reviewed by me.  No pulmonary embolism.  Labs reviewed: CBC, BMP reviewed by me  Any tests that were considered but not ordered: None     Decision rules/scores evaluated (example  [id number], Wells, etc): None     Discussed with: Patient     Care Planning  Shared decision making: Patient  Code status and discussions: CODE STATUS is full code    Disposition  Social Determinants of Health that impact treatment or disposition: None  I expect the patient to be discharged to home in 1 to 2 days  Electronically signed by Miguelito Sneed MD, 04/26/24, 17:47 CDT.

## 2024-04-27 ENCOUNTER — APPOINTMENT (OUTPATIENT)
Dept: CARDIOLOGY | Facility: HOSPITAL | Age: 59
End: 2024-04-27
Payer: COMMERCIAL

## 2024-04-27 ENCOUNTER — APPOINTMENT (OUTPATIENT)
Dept: ULTRASOUND IMAGING | Facility: HOSPITAL | Age: 59
End: 2024-04-27
Payer: COMMERCIAL

## 2024-04-27 LAB
ASCENDING AORTA: 3.5 CM
BH CV ECHO MEAS - AO MAX PG: 5.5 MMHG
BH CV ECHO MEAS - AO MEAN PG: 3 MMHG
BH CV ECHO MEAS - AO ROOT DIAM: 3.1 CM
BH CV ECHO MEAS - AO V2 MAX: 116.8 CM/SEC
BH CV ECHO MEAS - AO V2 VTI: 20.5 CM
BH CV ECHO MEAS - AVA(I,D): 2.7 CM2
BH CV ECHO MEAS - EDV(CUBED): 105.4 ML
BH CV ECHO MEAS - EDV(MOD-SP2): 30.2 ML
BH CV ECHO MEAS - EDV(MOD-SP4): 49 ML
BH CV ECHO MEAS - EF(MOD-BP): 48 %
BH CV ECHO MEAS - EF(MOD-SP2): 54.5 %
BH CV ECHO MEAS - EF(MOD-SP4): 47.3 %
BH CV ECHO MEAS - ESV(CUBED): 55.7 ML
BH CV ECHO MEAS - ESV(MOD-SP2): 13.7 ML
BH CV ECHO MEAS - ESV(MOD-SP4): 25.9 ML
BH CV ECHO MEAS - FS: 19.1 %
BH CV ECHO MEAS - IVS/LVPW: 0.99 CM
BH CV ECHO MEAS - IVSD: 0.97 CM
BH CV ECHO MEAS - LA DIMENSION: 3.2 CM
BH CV ECHO MEAS - LAT PEAK E' VEL: 8 CM/SEC
BH CV ECHO MEAS - LV MASS(C)D: 159.6 GRAMS
BH CV ECHO MEAS - LV MAX PG: 3.2 MMHG
BH CV ECHO MEAS - LV MEAN PG: 1.66 MMHG
BH CV ECHO MEAS - LV V1 MAX: 89.4 CM/SEC
BH CV ECHO MEAS - LV V1 VTI: 16.2 CM
BH CV ECHO MEAS - LVIDD: 4.7 CM
BH CV ECHO MEAS - LVIDS: 3.8 CM
BH CV ECHO MEAS - LVOT AREA: 3.5 CM2
BH CV ECHO MEAS - LVOT DIAM: 2.1 CM
BH CV ECHO MEAS - LVPWD: 0.98 CM
BH CV ECHO MEAS - MED PEAK E' VEL: 6 CM/SEC
BH CV ECHO MEAS - MV A MAX VEL: 92.1 CM/SEC
BH CV ECHO MEAS - MV DEC SLOPE: 210.3 CM/SEC2
BH CV ECHO MEAS - MV DEC TIME: 0.27 SEC
BH CV ECHO MEAS - MV E MAX VEL: 56.7 CM/SEC
BH CV ECHO MEAS - MV E/A: 0.62
BH CV ECHO MEAS - MV MAX PG: 4.3 MMHG
BH CV ECHO MEAS - MV MEAN PG: 1.1 MMHG
BH CV ECHO MEAS - MV P1/2T: 78 MSEC
BH CV ECHO MEAS - MV V2 VTI: 22 CM
BH CV ECHO MEAS - MVA(P1/2T): 2.8 CM2
BH CV ECHO MEAS - MVA(VTI): 2.5 CM2
BH CV ECHO MEAS - PA V2 MAX: 112.6 CM/SEC
BH CV ECHO MEAS - PULM A REVS DUR: 0.09 SEC
BH CV ECHO MEAS - PULM A REVS VEL: 25.3 CM/SEC
BH CV ECHO MEAS - RVDD: 2.7 CM
BH CV ECHO MEAS - SV(LVOT): 56.1 ML
BH CV ECHO MEAS - SV(MOD-SP2): 16.5 ML
BH CV ECHO MEAS - SV(MOD-SP4): 23.2 ML
BH CV ECHO MEAS - TAPSE (>1.6): 1.9 CM
BH CV ECHO MEASUREMENTS AVERAGE E/E' RATIO: 8.1
BH CV XLRA - RV BASE: 2.5 CM
BH CV XLRA - RV LENGTH: 6.7 CM
BH CV XLRA - RV MID: 2.4 CM
BH CV XLRA - TDI S': 13 CM/SEC
LEFT ATRIUM VOLUME INDEX: 18.6 ML/M2
LEFT ATRIUM VOLUME: 29 ML

## 2024-04-27 PROCEDURE — C1894 INTRO/SHEATH, NON-LASER: HCPCS | Performed by: INTERNAL MEDICINE

## 2024-04-27 PROCEDURE — 92921: CPT | Performed by: INTERNAL MEDICINE

## 2024-04-27 PROCEDURE — C1725 CATH, TRANSLUMIN NON-LASER: HCPCS | Performed by: INTERNAL MEDICINE

## 2024-04-27 PROCEDURE — 92920 PRQ TRLUML C ANGIOP 1ART&/BR: CPT | Performed by: INTERNAL MEDICINE

## 2024-04-27 PROCEDURE — 99153 MOD SED SAME PHYS/QHP EA: CPT | Performed by: INTERNAL MEDICINE

## 2024-04-27 PROCEDURE — 99152 MOD SED SAME PHYS/QHP 5/>YRS: CPT | Performed by: INTERNAL MEDICINE

## 2024-04-27 PROCEDURE — G0378 HOSPITAL OBSERVATION PER HR: HCPCS

## 2024-04-27 PROCEDURE — 25010000002 ENOXAPARIN PER 10 MG: Performed by: INTERNAL MEDICINE

## 2024-04-27 PROCEDURE — 25810000003 SODIUM CHLORIDE 0.9 % SOLUTION: Performed by: INTERNAL MEDICINE

## 2024-04-27 PROCEDURE — 93970 EXTREMITY STUDY: CPT

## 2024-04-27 PROCEDURE — 25510000001 IOPAMIDOL PER 1 ML: Performed by: INTERNAL MEDICINE

## 2024-04-27 PROCEDURE — 93454 CORONARY ARTERY ANGIO S&I: CPT

## 2024-04-27 PROCEDURE — 93306 TTE W/DOPPLER COMPLETE: CPT

## 2024-04-27 PROCEDURE — C1769 GUIDE WIRE: HCPCS | Performed by: INTERNAL MEDICINE

## 2024-04-27 PROCEDURE — 25010000002 MIDAZOLAM PER 1 MG: Performed by: INTERNAL MEDICINE

## 2024-04-27 PROCEDURE — 25010000002 DIPHENHYDRAMINE PER 50 MG: Performed by: INTERNAL MEDICINE

## 2024-04-27 PROCEDURE — 25010000002 HEPARIN (PORCINE) 1000-0.9 UT/500ML-% SOLUTION: Performed by: INTERNAL MEDICINE

## 2024-04-27 PROCEDURE — 25010000002 HEPARIN (PORCINE) 2000-0.9 UNIT/L-% SOLUTION: Performed by: INTERNAL MEDICINE

## 2024-04-27 PROCEDURE — 25010000002 FENTANYL CITRATE (PF) 50 MCG/ML SOLUTION: Performed by: INTERNAL MEDICINE

## 2024-04-27 PROCEDURE — 93306 TTE W/DOPPLER COMPLETE: CPT | Performed by: INTERNAL MEDICINE

## 2024-04-27 PROCEDURE — C1760 CLOSURE DEV, VASC: HCPCS | Performed by: INTERNAL MEDICINE

## 2024-04-27 PROCEDURE — C1887 CATHETER, GUIDING: HCPCS | Performed by: INTERNAL MEDICINE

## 2024-04-27 RX ORDER — MAGNESIUM HYDROXIDE 1200 MG/15ML
LIQUID ORAL
Status: DISCONTINUED | OUTPATIENT
Start: 2024-04-27 | End: 2024-04-27 | Stop reason: HOSPADM

## 2024-04-27 RX ORDER — GABAPENTIN 300 MG/1
600 CAPSULE ORAL EVERY 8 HOURS SCHEDULED
Status: DISCONTINUED | OUTPATIENT
Start: 2024-04-27 | End: 2024-04-28 | Stop reason: HOSPADM

## 2024-04-27 RX ORDER — ZOLPIDEM TARTRATE 5 MG/1
10 TABLET ORAL NIGHTLY PRN
Status: DISCONTINUED | OUTPATIENT
Start: 2024-04-27 | End: 2024-04-28 | Stop reason: HOSPADM

## 2024-04-27 RX ORDER — HYDROCODONE BITARTRATE AND ACETAMINOPHEN 10; 325 MG/1; MG/1
1 TABLET ORAL EVERY 6 HOURS PRN
Status: DISCONTINUED | OUTPATIENT
Start: 2024-04-27 | End: 2024-04-28 | Stop reason: HOSPADM

## 2024-04-27 RX ORDER — ACETAMINOPHEN 325 MG/1
650 TABLET ORAL EVERY 4 HOURS PRN
Status: DISCONTINUED | OUTPATIENT
Start: 2024-04-27 | End: 2024-04-28 | Stop reason: HOSPADM

## 2024-04-27 RX ORDER — ATORVASTATIN CALCIUM 40 MG/1
80 TABLET, FILM COATED ORAL NIGHTLY
Status: DISCONTINUED | OUTPATIENT
Start: 2024-04-27 | End: 2024-04-28 | Stop reason: HOSPADM

## 2024-04-27 RX ORDER — LIDOCAINE HYDROCHLORIDE 20 MG/ML
INJECTION, SOLUTION INFILTRATION; PERINEURAL
Status: DISCONTINUED | OUTPATIENT
Start: 2024-04-27 | End: 2024-04-27 | Stop reason: HOSPADM

## 2024-04-27 RX ORDER — HEPARIN SODIUM 200 [USP'U]/100ML
INJECTION, SOLUTION INTRAVENOUS
Status: DISCONTINUED | OUTPATIENT
Start: 2024-04-27 | End: 2024-04-27 | Stop reason: HOSPADM

## 2024-04-27 RX ORDER — CLOPIDOGREL BISULFATE 75 MG/1
TABLET ORAL
Status: DISCONTINUED | OUTPATIENT
Start: 2024-04-27 | End: 2024-04-27 | Stop reason: HOSPADM

## 2024-04-27 RX ORDER — DIPHENHYDRAMINE HYDROCHLORIDE 50 MG/ML
INJECTION INTRAMUSCULAR; INTRAVENOUS
Status: DISCONTINUED | OUTPATIENT
Start: 2024-04-27 | End: 2024-04-27 | Stop reason: HOSPADM

## 2024-04-27 RX ORDER — MIDAZOLAM HYDROCHLORIDE 1 MG/ML
INJECTION INTRAMUSCULAR; INTRAVENOUS
Status: DISCONTINUED | OUTPATIENT
Start: 2024-04-27 | End: 2024-04-27 | Stop reason: HOSPADM

## 2024-04-27 RX ORDER — NITROGLYCERIN 0.4 MG/1
0.4 TABLET SUBLINGUAL
Status: DISCONTINUED | OUTPATIENT
Start: 2024-04-27 | End: 2024-04-28 | Stop reason: HOSPADM

## 2024-04-27 RX ORDER — SPIRONOLACTONE 25 MG/1
25 TABLET ORAL DAILY
Status: DISCONTINUED | OUTPATIENT
Start: 2024-04-27 | End: 2024-04-28 | Stop reason: HOSPADM

## 2024-04-27 RX ORDER — FENTANYL CITRATE 50 UG/ML
INJECTION, SOLUTION INTRAMUSCULAR; INTRAVENOUS
Status: DISCONTINUED | OUTPATIENT
Start: 2024-04-27 | End: 2024-04-27 | Stop reason: HOSPADM

## 2024-04-27 RX ORDER — SODIUM CHLORIDE 9 MG/ML
100 INJECTION, SOLUTION INTRAVENOUS CONTINUOUS
Status: DISPENSED | OUTPATIENT
Start: 2024-04-27 | End: 2024-04-27

## 2024-04-27 RX ADMIN — ASPIRIN 81 MG: 81 TABLET, COATED ORAL at 08:17

## 2024-04-27 RX ADMIN — SACUBITRIL AND VALSARTAN 1 TABLET: 24; 26 TABLET, FILM COATED ORAL at 08:17

## 2024-04-27 RX ADMIN — SACUBITRIL AND VALSARTAN 1 TABLET: 24; 26 TABLET, FILM COATED ORAL at 20:59

## 2024-04-27 RX ADMIN — ZOLPIDEM TARTRATE 10 MG: 5 TABLET ORAL at 20:59

## 2024-04-27 RX ADMIN — FUROSEMIDE 40 MG: 40 TABLET ORAL at 08:17

## 2024-04-27 RX ADMIN — Medication 10 ML: at 21:35

## 2024-04-27 RX ADMIN — DOCUSATE SODIUM AND SENNOSIDES 2 TABLET: 8.6; 5 TABLET, FILM COATED ORAL at 20:59

## 2024-04-27 RX ADMIN — CARVEDILOL 6.25 MG: 6.25 TABLET, FILM COATED ORAL at 08:17

## 2024-04-27 RX ADMIN — GABAPENTIN 600 MG: 300 CAPSULE ORAL at 13:38

## 2024-04-27 RX ADMIN — ATORVASTATIN CALCIUM 80 MG: 40 TABLET, FILM COATED ORAL at 20:59

## 2024-04-27 RX ADMIN — Medication 10 ML: at 08:20

## 2024-04-27 RX ADMIN — CLOPIDOGREL BISULFATE 75 MG: 75 TABLET, FILM COATED ORAL at 08:17

## 2024-04-27 RX ADMIN — TOPIRAMATE 50 MG: 25 TABLET, FILM COATED ORAL at 08:17

## 2024-04-27 RX ADMIN — RANOLAZINE 500 MG: 500 TABLET, FILM COATED, EXTENDED RELEASE ORAL at 20:59

## 2024-04-27 RX ADMIN — SODIUM CHLORIDE 100 ML/HR: 9 INJECTION, SOLUTION INTRAVENOUS at 12:00

## 2024-04-27 RX ADMIN — PANTOPRAZOLE SODIUM 40 MG: 40 TABLET, DELAYED RELEASE ORAL at 20:59

## 2024-04-27 RX ADMIN — HYDROCODONE BITARTRATE AND ACETAMINOPHEN 1 TABLET: 10; 325 TABLET ORAL at 19:46

## 2024-04-27 RX ADMIN — SPIRONOLACTONE 25 MG: 25 TABLET ORAL at 13:07

## 2024-04-27 RX ADMIN — RANOLAZINE 500 MG: 500 TABLET, FILM COATED, EXTENDED RELEASE ORAL at 08:17

## 2024-04-27 RX ADMIN — ENOXAPARIN SODIUM 40 MG: 100 INJECTION SUBCUTANEOUS at 20:58

## 2024-04-27 RX ADMIN — ISOSORBIDE MONONITRATE 30 MG: 30 TABLET, EXTENDED RELEASE ORAL at 08:17

## 2024-04-27 RX ADMIN — GABAPENTIN 600 MG: 300 CAPSULE ORAL at 21:00

## 2024-04-27 NOTE — NURSING NOTE
Notified rx of found pill under pt bed.  Wasted med as instructed by rx with charge nurse aditi amaya.

## 2024-04-27 NOTE — PLAN OF CARE
"Goal Outcome Evaluation:  Plan of Care Reviewed With: patient        Progress: no change  Outcome Evaluation: AOX4 on 2L O2 per NC non-compliant w/care this shift made attempts to leave the floor X2 stated multiple times that she wanted to rip her heart monitor off and leave cursed at staff threw phone across room when dietary staff attempted to take her meal order ripped menu up and threw it onto the floor when this nurse attempted to assist w/ordering meal stated \"just ask the little brittish boy in the corner what he would like to eat I'm not eating any of that\" removed hat from toilet threw it in the floor threw toilet paper onto floor up in [place] unsteady assisted back to bed bed alarm set CN notified unsteady sitting on side of bed assisted pt to lie in bed sitter asigned to sit in rm w/pt threw food in floor after meal arrived daughter notified per CN shift change RRT called r/t low B/P pt uresponsive to voice/touch responed following sternal rubs per oncoming nurse home meds in rm pharm notified security notified fluids admin during RRT per order B/P improved                               "

## 2024-04-27 NOTE — PLAN OF CARE
Problem: Adult Inpatient Plan of Care  Goal: Plan of Care Review  Outcome: Ongoing, Progressing  Goal: Patient-Specific Goal (Individualized)  Outcome: Ongoing, Progressing  Goal: Absence of Hospital-Acquired Illness or Injury  Outcome: Ongoing, Progressing  Intervention: Identify and Manage Fall Risk  Recent Flowsheet Documentation  Taken 4/27/2024 1707 by Bowen Mcfarlane RN  Safety Promotion/Fall Prevention: safety round/check completed  Taken 4/27/2024 1605 by Bowen Mcfarlane RN  Safety Promotion/Fall Prevention: safety round/check completed  Taken 4/27/2024 1511 by Bowen Mcfarlane RN  Safety Promotion/Fall Prevention: safety round/check completed  Taken 4/27/2024 1401 by Bowen Mcfarlane RN  Safety Promotion/Fall Prevention: safety round/check completed  Taken 4/27/2024 1302 by Bowen Mcfarlane RN  Safety Promotion/Fall Prevention: safety round/check completed  Taken 4/27/2024 1201 by Bowen Mcfarlane RN  Safety Promotion/Fall Prevention: safety round/check completed  Taken 4/27/2024 1102 by Bowen Mcfarlane RN  Safety Promotion/Fall Prevention: patient off unit  Taken 4/27/2024 1010 by Bowen Mcfarlane RN  Safety Promotion/Fall Prevention: patient off unit  Taken 4/27/2024 0909 by Bowen Mcfarlane RN  Safety Promotion/Fall Prevention: safety round/check completed  Taken 4/27/2024 0820 by Bowen Mcfarlane RN  Safety Promotion/Fall Prevention: safety round/check completed  Taken 4/27/2024 0722 by Bowen Mcfarlane RN  Safety Promotion/Fall Prevention: safety round/check completed  Intervention: Prevent Skin Injury  Recent Flowsheet Documentation  Taken 4/27/2024 1707 by Bowen Mcfarlane RN  Body Position: position changed independently  Taken 4/27/2024 1605 by Bowen Mcfarlane RN  Body Position: position changed independently  Taken 4/27/2024 1511 by Bowen Mcfarlane RN  Body Position: position changed independently  Taken 4/27/2024 1401 by Bowen Mcfarlane RN  Body Position:   30 degrees   position changed independently  Taken 4/27/2024 1302 by Denys  DINESH Wiseman  Body Position:   30 degrees   position changed independently  Taken 4/27/2024 1201 by Bowen Mcfarlane RN  Body Position: supine  Taken 4/27/2024 0909 by Bowen Mcfarlane RN  Body Position: position changed independently  Taken 4/27/2024 0820 by Bowen Mcfarlane RN  Body Position: position changed independently  Taken 4/27/2024 0722 by Bowen Mcfarlane RN  Body Position: position changed independently  Intervention: Prevent and Manage VTE (Venous Thromboembolism) Risk  Recent Flowsheet Documentation  Taken 4/27/2024 0722 by Bowen Mcfarlane RN  Activity Management: ambulated to bathroom  VTE Prevention/Management: (Lovenox) other (see comments)  Goal: Optimal Comfort and Wellbeing  Outcome: Ongoing, Progressing  Intervention: Provide Person-Centered Care  Recent Flowsheet Documentation  Taken 4/27/2024 0722 by Bowen Mcfarlane RN  Trust Relationship/Rapport: care explained  Goal: Readiness for Transition of Care  Outcome: Ongoing, Progressing     Problem: Chest Pain  Goal: Resolution of Chest Pain Symptoms  Outcome: Ongoing, Progressing     Problem: Fall Injury Risk  Goal: Absence of Fall and Fall-Related Injury  Outcome: Ongoing, Progressing  Intervention: Identify and Manage Contributors  Recent Flowsheet Documentation  Taken 4/27/2024 0722 by Bowen Mcfarlane RN  Medication Review/Management: medications reviewed  Intervention: Promote Injury-Free Environment  Recent Flowsheet Documentation  Taken 4/27/2024 1707 by Bowen Mcfarlane RN  Safety Promotion/Fall Prevention: safety round/check completed  Taken 4/27/2024 1605 by Bowen Mcfarlane RN  Safety Promotion/Fall Prevention: safety round/check completed  Taken 4/27/2024 1511 by Bowen Mcfarlane RN  Safety Promotion/Fall Prevention: safety round/check completed  Taken 4/27/2024 1401 by Bowen Mcfarlane RN  Safety Promotion/Fall Prevention: safety round/check completed  Taken 4/27/2024 1302 by Bowen Mcfarlane RN  Safety Promotion/Fall Prevention: safety round/check completed  Taken  4/27/2024 1201 by Bowen Mcfarlane RN  Safety Promotion/Fall Prevention: safety round/check completed  Taken 4/27/2024 1102 by Bowen Mcfarlane RN  Safety Promotion/Fall Prevention: patient off unit  Taken 4/27/2024 1010 by Bowen Mcfarlane RN  Safety Promotion/Fall Prevention: patient off unit  Taken 4/27/2024 0909 by Bowen Mcfarlane RN  Safety Promotion/Fall Prevention: safety round/check completed  Taken 4/27/2024 0820 by Bowen Mcfarlane RN  Safety Promotion/Fall Prevention: safety round/check completed  Taken 4/27/2024 0722 by Bowen Mcfarlane RN  Safety Promotion/Fall Prevention: safety round/check completed     Problem: Restraint, Nonviolent  Goal: Absence of Harm or Injury  Outcome: Ongoing, Progressing  Intervention: Protect Dignity, Rights, and Personal Wellbeing  Recent Flowsheet Documentation  Taken 4/27/2024 0722 by Bowen Mcfarlane RN  Trust Relationship/Rapport: care explained  Intervention: Protect Skin and Joint Integrity  Recent Flowsheet Documentation  Taken 4/27/2024 1707 by Bowen Mcfarlane RN  Body Position: position changed independently  Taken 4/27/2024 1605 by Bowen Mcfarlane RN  Body Position: position changed independently  Taken 4/27/2024 1511 by Bowen Mcfarlane RN  Body Position: position changed independently  Taken 4/27/2024 1401 by Bowen Mcfarlane RN  Body Position:   30 degrees   position changed independently  Taken 4/27/2024 1302 by Bowen Mcfarlane RN  Body Position:   30 degrees   position changed independently  Taken 4/27/2024 1201 by Bowen Mcfarlane RN  Body Position: supine  Taken 4/27/2024 0909 by Bowen Mcfarlane RN  Body Position: position changed independently  Taken 4/27/2024 0820 by Bowen Mcfarlane RN  Body Position: position changed independently  Taken 4/27/2024 0722 by Bowen Mcfarlane RN  Body Position: position changed independently   Goal Outcome Evaluation:

## 2024-04-27 NOTE — NURSING NOTE
Pt very combative, attempting to hit technician and bite.  Kicked nurse in head while attempting to repostion pt.   Restraint order received.  Restraints applied.  Sitter at bedside.

## 2024-04-27 NOTE — NURSING NOTE
Found white oblong pill under pt's bed.  Unknown source.  Pt had home medications (hydrocodone, ambien) in personal purse at bedside.  Meds sent to rx for lockup and purse given to pts daughter for safety.

## 2024-04-27 NOTE — CONSULTS
"  [facility] CONSULT NOTE      Reason for Consultation: abnormal stress testing    Patient Care Team:  Coy Guillermo MD as PCP - General (Family Medicine)  Osman Patrick DO as Consulting Physician (Cardiology)    Chief complaint chest pain    Subjective .     History of present illness:  This patient is a 58 year old white female with a history of CAD, s/p PCI in the past.  Her most recent heart cath was in August 2024 wherein she received PTCA and PCI to LAD and PTCA to first diagonal and OM.  She presents to ER at outlying facility for complaints of chest pain and was ultimately transferred to [facility].  On interview, she tells me she has been experiencing intermittent exertional chest pain that radiates to her LUE.  She describes it as \"an elephant sitting on her chest.\"  It is relieved with rest.  She has associated shortness of breath and nausea.  These symptoms are somewhat similar to previous angina.  She has not taken any SL NTG.  She also tells me she has worsening dyspnea with basic house chores such as making her bed.  She admits to orthopnea and leg swelling.  She has taken Lasix on occasion which provided some relief.    Review of Systems  A 10-point review of systems is obtained and negative except for otherwise mentioned above.    History  Past Medical History:   Diagnosis Date    CHF (congestive heart failure)     Chronic pain syndrome     COPD (chronic obstructive pulmonary disease)     Tobacco dependence    ,   Past Surgical History:   Procedure Laterality Date    BREAST BIOPSY Right     CARDIAC CATHETERIZATION N/A 07/29/2023    Procedure: Left Heart Cath;  Surgeon: Osman Patrick DO;  Location:  [facility];  Service: Cardiovascular;  Laterality: N/A;    CARDIAC CATHETERIZATION N/A 08/17/2023    Procedure: Percutaneous Coronary Intervention;  Surgeon: Osman Patrick DO;  Location:  [facility];  " Service: Cardiovascular;  Laterality: N/A;    CHOLECYSTECTOMY      CORONARY STENT PLACEMENT  2023    X 2    HYSTERECTOMY      KNEE ARTHROSCOPY W/ MEDIAL COLLATERAL LIGAMENT (MCL) REPAIR Right     x 2    TONSILLECTOMY     ,   Family History   Problem Relation Age of Onset    COPD Mother     Diabetes Father     Heart failure Father     BRCA 1/2 Neg Hx    ,   Social History     Tobacco Use    Smoking status: Every Day     Current packs/day: 1.00     Average packs/day: 1 pack/day for 51.0 years (51.0 ttl pk-yrs)     Types: Cigarettes    Smokeless tobacco: Never   Vaping Use    Vaping status: Never Used   Substance Use Topics    Alcohol use: Not Currently    Drug use: Not Currently   ,   Medications Prior to Admission   Medication Sig Dispense Refill Last Dose    albuterol sulfate  (90 Base) MCG/ACT inhaler Inhale 2 puffs Every 4 (Four) Hours As Needed for Wheezing.   Past Month    aspirin 81 MG EC tablet Take 1 tablet by mouth Daily. 30 tablet 2 4/25/2024    atorvastatin (LIPITOR) 40 MG tablet Take 1 tablet by mouth Every Night.   4/24/2024    carvedilol (COREG) 6.25 MG tablet Take 1 tablet by mouth 2 (Two) Times a Day With Meals.   4/25/2024    clopidogrel (PLAVIX) 75 MG tablet Take 1 tablet by mouth Daily. 30 tablet 2 4/25/2024    FLUoxetine (PROzac) 20 MG capsule Take 2 capsules by mouth Daily.   4/25/2024    gabapentin (NEURONTIN) 600 MG tablet Take 1 tablet by mouth 3 (Three) Times a Day.   4/25/2024    HYDROcodone-acetaminophen (NORCO)  MG per tablet Take 1 tablet by mouth 4 (Four) Times a Day.   4/25/2024    isosorbide mononitrate (IMDUR) 30 MG 24 hr tablet Take 1 tablet by mouth Daily. 30 tablet 11 4/25/2024    pantoprazole (PROTONIX) 40 MG EC tablet Take 1 tablet by mouth 2 (Two) Times a Day.   4/25/2024    ranolazine (Ranexa) 500 MG 12 hr tablet Take 1 tablet by mouth 2 (Two) Times a Day. 60 tablet 5 4/25/2024    sacubitril-valsartan (Entresto) 24-26 MG tablet Take 1 tablet by mouth 2 (Two) Times  "a Day.   4/25/2024    spironolactone (ALDACTONE) 25 MG tablet Take 1 tablet by mouth Daily.   4/25/2024    tiZANidine (ZANAFLEX) 4 MG tablet Take 1 tablet by mouth 2 (Two) Times a Day As Needed for Muscle Spasms.   4/25/2024    topiramate (TOPAMAX) 50 MG tablet Take 1 tablet by mouth Daily.   4/25/2024    vitamin D (ERGOCALCIFEROL) 1.25 MG (71810 UT) capsule capsule Take 1 capsule by mouth 1 (One) Time Per Week.   Past Month    zolpidem (AMBIEN) 10 MG tablet Take 1 tablet by mouth At Night As Needed for Sleep.   4/24/2024    and Allergies:  Levaquin [levofloxacin], Morphine, Codeine, Doxycycline, Metronidazole, Naproxen, Propoxyphene, Tetanus toxoids, and Tetracyclines & related    Objective     Vital Sign Min/Max for last 24 hours  Temp  Min: 97.6 °F (36.4 °C)  Max: 98.2 °F (36.8 °C)   BP  Min: 55/27  Max: 157/88   Pulse  Min: 55  Max: 88   Resp  Min: 16  Max: 20   SpO2  Min: 91 %  Max: 98 %   No data recorded   Weight  Min: 69.1 kg (152 lb 5.4 oz)  Max: 69.1 kg (152 lb 5.4 oz)     Flowsheet Rows      Flowsheet Row First Filed Value   Admission Height 152.4 cm (60\") Documented at 04/25/2024 2005   Admission Weight 69.1 kg (152 lb 6.4 oz) Documented at 04/25/2024 2005             Physical Exam:     General Appearance:    Alert, cooperative, in no acute distress   Head:    Normocephalic, without obvious abnormality, atraumatic   Eyes:            Lids and lashes normal, conjunctivae and sclerae normal, no   icterus, PERRLA, EOMI   Throat:   Oral mucosa pink and moist   Neck:   No adenopathy, supple, trachea midline, no thyromegaly, no   carotid bruit, no JVD   Lungs:     Clear to auscultation bilaterally,respirations regular, even     and unlabored    Heart:    Regular rhythm and normal rate, normal S1 and S2, no            murmur, no gallop, no rub, no click   Chest Wall:    No abnormalities observed   Abdomen:     Normal bowel sounds present in all four quadrants, no       masses, no organomegaly, soft non-tender, " "non-distended    Extremities:   No edema, no cyanosis, no clubbing   Pulses:   Pulses palpable and equal bilaterally   Skin:   No bleeding, bruising or rash   Psychiatric:   Displays appropriate mood and affect           Results Review:    I reviewed the patient's new clinical results.    Results from last 7 days   Lab Units 04/26/24  0009   WBC 10*3/mm3 8.53   HEMOGLOBIN g/dL 11.6*   HEMATOCRIT % 37.5   PLATELETS 10*3/mm3 312     Results from last 7 days   Lab Units 04/26/24  0009   SODIUM mmol/L 142   POTASSIUM mmol/L 3.9   CHLORIDE mmol/L 105   CO2 mmol/L 27.0   BUN mg/dL 8   CREATININE mg/dL 0.73   GLUCOSE mg/dL 179*   CALCIUM mg/dL 9.1     Results from last 7 days   Lab Units 04/26/24  0009   SODIUM mmol/L 142   POTASSIUM mmol/L 3.9   CHLORIDE mmol/L 105   CO2 mmol/L 27.0   BUN mg/dL 8   CREATININE mg/dL 0.73   CALCIUM mg/dL 9.1   GLUCOSE mg/dL 179*     Results from last 7 days   Lab Units 04/26/24 0009 04/25/24 2059   HSTROP T ng/L 13 12         Results from last 7 days   Lab Units 04/25/24 2059   CHOLESTEROL mg/dL 160   TRIGLYCERIDES mg/dL 124   HDL CHOL mg/dL 52   LDL CHOL mg/dL 86     No results found for: \"BNP\"  No components found for: \"PPI\"        Assessment & Plan     Chest pain:  Nuclear stress testing is abnormal.  Symptoms are concerning for angina.  Proceed with heart catheterization with Dr. Ann today.  Patient verbalizes understanding and is agreeable.      Dyspnea, orthopnea, edema:  Patient appears to be clinically euvolemic at this time.  Obtain echocardiogram.    CAD, s/p PCI:  Continue DAPT, statin, Imdur, Ranexa, Coreg, Entresto.    Chronic diastolic CHF:  Patient is euvolemic.  Continue Coreg, Entresto, spironolactone, Lasix.    Hypertension:  With episodes of hypotensive.  Monitor BP.  Continue current therapies at present doses.    Hyperlipidemia:  Increase Lipitor.  LDL goal<70.    Tobacco abuse:  Recommend cessation.    Thank you kindly for the consult.  Please never hesitate to " call with further questions or concerns.  Further recommendations to follow from Dr. Ann.            I discussed the patient's findings and my recommendations with patient, nursing staff, and consulting provider    Nusrat Mccollum PA-C  04/27/24  08:11 CDT

## 2024-04-27 NOTE — NURSING NOTE
Pt found to be lethargic and difficult to rouse. RRT called and team to bedside.  Bp=56/32  Responds to brisk sternal rub.  Slowly awakened becoming very combative and uncooperative, attempting to kick md at bedside.  Narcan held, bolus given.

## 2024-04-28 ENCOUNTER — READMISSION MANAGEMENT (OUTPATIENT)
Dept: CALL CENTER | Facility: HOSPITAL | Age: 59
End: 2024-04-28
Payer: COMMERCIAL

## 2024-04-28 VITALS
HEART RATE: 68 BPM | HEIGHT: 60 IN | DIASTOLIC BLOOD PRESSURE: 64 MMHG | OXYGEN SATURATION: 98 % | TEMPERATURE: 97.7 F | RESPIRATION RATE: 17 BRPM | SYSTOLIC BLOOD PRESSURE: 87 MMHG | BODY MASS INDEX: 29.84 KG/M2 | WEIGHT: 152 LBS

## 2024-04-28 PROBLEM — I50.32 CHRONIC HEART FAILURE WITH PRESERVED EJECTION FRACTION (HFPEF): Status: ACTIVE | Noted: 2024-04-28

## 2024-04-28 LAB
ANION GAP SERPL CALCULATED.3IONS-SCNC: 9 MMOL/L (ref 5–15)
BUN SERPL-MCNC: 12 MG/DL (ref 6–20)
BUN/CREAT SERPL: 12.4 (ref 7–25)
CALCIUM SPEC-SCNC: 8.7 MG/DL (ref 8.6–10.5)
CHLORIDE SERPL-SCNC: 99 MMOL/L (ref 98–107)
CO2 SERPL-SCNC: 29 MMOL/L (ref 22–29)
CREAT SERPL-MCNC: 0.97 MG/DL (ref 0.57–1)
DEPRECATED RDW RBC AUTO: 50.6 FL (ref 37–54)
EGFRCR SERPLBLD CKD-EPI 2021: 68.7 ML/MIN/1.73
ERYTHROCYTE [DISTWIDTH] IN BLOOD BY AUTOMATED COUNT: 15.3 % (ref 12.3–15.4)
GLUCOSE SERPL-MCNC: 117 MG/DL (ref 65–99)
HCT VFR BLD AUTO: 37.1 % (ref 34–46.6)
HGB BLD-MCNC: 11.4 G/DL (ref 12–15.9)
MCH RBC QN AUTO: 27.7 PG (ref 26.6–33)
MCHC RBC AUTO-ENTMCNC: 30.7 G/DL (ref 31.5–35.7)
MCV RBC AUTO: 90.3 FL (ref 79–97)
PLATELET # BLD AUTO: 302 10*3/MM3 (ref 140–450)
PMV BLD AUTO: 10.8 FL (ref 6–12)
POTASSIUM SERPL-SCNC: 3.5 MMOL/L (ref 3.5–5.2)
QT INTERVAL: 394 MS
QT INTERVAL: 448 MS
QT INTERVAL: 462 MS
QTC INTERVAL: 422 MS
QTC INTERVAL: 493 MS
QTC INTERVAL: 498 MS
RBC # BLD AUTO: 4.11 10*6/MM3 (ref 3.77–5.28)
SODIUM SERPL-SCNC: 137 MMOL/L (ref 136–145)
WBC NRBC COR # BLD AUTO: 8.79 10*3/MM3 (ref 3.4–10.8)

## 2024-04-28 PROCEDURE — 80048 BASIC METABOLIC PNL TOTAL CA: CPT | Performed by: INTERNAL MEDICINE

## 2024-04-28 PROCEDURE — 85027 COMPLETE CBC AUTOMATED: CPT | Performed by: INTERNAL MEDICINE

## 2024-04-28 PROCEDURE — G0378 HOSPITAL OBSERVATION PER HR: HCPCS

## 2024-04-28 RX ORDER — NICOTINE 21-14-7MG
1 KIT TRANSDERMAL DAILY
Qty: 56 EACH | Refills: 0 | Status: CANCELLED | OUTPATIENT
Start: 2024-04-28

## 2024-04-28 RX ORDER — ISOSORBIDE MONONITRATE 30 MG/1
30 TABLET, EXTENDED RELEASE ORAL DAILY
Qty: 30 TABLET | Refills: 1 | Status: SHIPPED | OUTPATIENT
Start: 2024-04-28

## 2024-04-28 RX ORDER — ATORVASTATIN CALCIUM 80 MG/1
80 TABLET, FILM COATED ORAL NIGHTLY
Qty: 90 TABLET | Refills: 0 | Status: SHIPPED | OUTPATIENT
Start: 2024-04-28

## 2024-04-28 RX ORDER — FUROSEMIDE 20 MG/1
20 TABLET ORAL DAILY
Qty: 90 TABLET | Refills: 0 | Status: SHIPPED | OUTPATIENT
Start: 2024-04-28

## 2024-04-28 RX ORDER — CARVEDILOL 6.25 MG/1
6.25 TABLET ORAL 2 TIMES DAILY WITH MEALS
Qty: 180 TABLET | Refills: 0 | Status: SHIPPED | OUTPATIENT
Start: 2024-04-28 | End: 2024-07-27

## 2024-04-28 RX ORDER — NITROGLYCERIN 0.4 MG/1
0.4 TABLET SUBLINGUAL
Qty: 50 TABLET | Refills: 1 | Status: SHIPPED | OUTPATIENT
Start: 2024-04-28

## 2024-04-28 RX ORDER — RANOLAZINE 500 MG/1
500 TABLET, EXTENDED RELEASE ORAL 2 TIMES DAILY
Qty: 60 TABLET | Refills: 1 | Status: SHIPPED | OUTPATIENT
Start: 2024-04-28

## 2024-04-28 RX ORDER — CLOPIDOGREL BISULFATE 75 MG/1
75 TABLET ORAL DAILY
Qty: 90 TABLET | Refills: 0 | Status: SHIPPED | OUTPATIENT
Start: 2024-04-28 | End: 2024-07-27

## 2024-04-28 RX ORDER — POTASSIUM CHLORIDE 750 MG/1
10 TABLET, FILM COATED, EXTENDED RELEASE ORAL DAILY
Qty: 60 TABLET | Refills: 0 | Status: SHIPPED | OUTPATIENT
Start: 2024-04-28

## 2024-04-28 RX ORDER — SPIRONOLACTONE 25 MG/1
12.5 TABLET ORAL DAILY
Qty: 30 TABLET | Refills: 0 | Status: SHIPPED | OUTPATIENT
Start: 2024-04-28 | End: 2024-06-27

## 2024-04-28 RX ORDER — SACUBITRIL AND VALSARTAN 24; 26 MG/1; MG/1
1 TABLET, FILM COATED ORAL 2 TIMES DAILY
Qty: 60 TABLET | Refills: 1 | Status: SHIPPED | OUTPATIENT
Start: 2024-04-28

## 2024-04-28 RX ADMIN — PANTOPRAZOLE SODIUM 40 MG: 40 TABLET, DELAYED RELEASE ORAL at 08:38

## 2024-04-28 RX ADMIN — SACUBITRIL AND VALSARTAN 1 TABLET: 24; 26 TABLET, FILM COATED ORAL at 08:39

## 2024-04-28 RX ADMIN — TOPIRAMATE 50 MG: 25 TABLET, FILM COATED ORAL at 08:38

## 2024-04-28 RX ADMIN — ASPIRIN 81 MG: 81 TABLET, COATED ORAL at 08:38

## 2024-04-28 RX ADMIN — CLOPIDOGREL BISULFATE 75 MG: 75 TABLET, FILM COATED ORAL at 08:40

## 2024-04-28 RX ADMIN — GABAPENTIN 600 MG: 300 CAPSULE ORAL at 05:25

## 2024-04-28 RX ADMIN — HYDROCODONE BITARTRATE AND ACETAMINOPHEN 1 TABLET: 10; 325 TABLET ORAL at 02:53

## 2024-04-28 RX ADMIN — Medication 10 ML: at 08:40

## 2024-04-28 NOTE — DISCHARGE SUMMARY
[facility] Medicine Services  DISCHARGE SUMMARY       Date of Admission: 4/25/2024  Date of Discharge:  4/28/2024  Primary Care Physician: Coy Guillermo MD    Presenting Problem/History of Present Illness: Chest pain    Final Discharge Diagnoses:  Active Hospital Problems    Diagnosis     **Chest pain     Chronic heart failure with preserved ejection fraction (HFpEF)     Chronic diastolic CHF (congestive heart failure)     Elevated d-dimer     Anxiety disorder     Coronary artery disease of native artery of native heart with stable angina pectoris     Chronic pain syndrome     COPD (chronic obstructive pulmonary disease)     Tobacco dependence      Consults: Cardiology    Procedures Performed: Left heart cardiac catheterization 4/27/2024    Pertinent Test Results:   Results for orders placed during the hospital encounter of 04/25/24    Adult Transthoracic Echo Complete W/ Cont if Necessary Per Protocol    Interpretation Summary    Left ventricular systolic function is low normal. Left ventricular ejection fraction appears to be 51 - 55%.    Normal right ventricular cavity size and systolic function noted.    No significant valvular abnormalities identified on this study.      Imaging Results (All)       Procedure Component Value Units Date/Time    US Venous Doppler Lower Extremity Bilateral (duplex) [[id number]] Resulted: 04/27/24 1625     Updated: 04/27/24 1701    CT Angiogram Chest [[id number]] Collected: 04/26/24 0919     Updated: 04/26/24 0950    Narrative:      CT ANGIOGRAM CHEST- 4/25/2024 9:40 PM     HISTORY: chest pain transfer from outside facility w/ + d-dimer, r/o PE      COMPARISON: 7/28/2023     TOTAL DOSE LENGTH PRODUCT: 252.49 mGy.cm. Automated exposure control was  also utilized to decrease patient radiation dose.     TECHNIQUE: Axial images of the chest are performed following IV  contrast. 2D, 3D, MIPS reconstructed images are reviewed.     FINDINGS:  There are no pulmonary emboli. No thoracic aortic aneurysm or  dissection. Mild noncalcified mural plaque of the descending thoracic  aorta remains stable compared to 7/28/2023 favoring mild atherosclerotic  plaque. There is mild cardiomegaly with mild right and moderate left  coronary artery calcification. No pericardial or pleural effusion. There  is mild right hilar adenopathy on today's exam measuring up to 1.5 cm in  width. No pathologic mediastinal or axillary lymphadenopathy visualized.     Images the upper abdomen demonstrate no adrenal nodules. Cholecystectomy  clips. Accessory splenules adjacent to the tail of the pancreas.  Decompressed stomach.     There is no lobar consolidation. There are scattered somewhat  ill-defined tiny centrilobular nodules, and often seen with respiratory  bronchiolitis interstitial lung disease in the tobacco using population.  Findings are present within the upper and lower lobes, therefore a  nonspecific interstitial pneumonitis is also considered. 4 mm left lower  lobe nodule seen on series 9 image 107. 4 mm right upper lobe pulmonary  nodule image 48. 3 mm anterior left upper lobe nodule image 69. No  pneumothorax. No endobronchial lesion.     No focal aggressive regional bony lesions.       Impression:         1. No pulmonary emboli.     2. Similar noncalcified mild atherosclerotic plaque suspected of the  thoracic aorta with no aneurysm or dissection. Moderate left and mild  right coronary calcification.     3. There is mild right hilar adenopathy, likely reactive. No mediastinal  or pathologic axillary lymphadenopathy.     4. Scattered primarily centrilobular ill-defined tiny diffuse pulmonary  nodules with involvement of the upper and lower lobes. Respiratory  bronchiolitis interstitial lung disease is considered as well as  nonspecific interstitial pneumonitis. Nonemergent pulmonary consultation  may be helpful with a follow-up CT chest in 12 months due to  the  presence of small 4 mm pulmonary nodules.     Comments: A preliminary report is issued to the ER by the [facility]  radiology service. I agree with the absence of pulmonary emboli and the  lack of focal consolidation, pleural effusion, or pneumothorax. The  discussion of the scattered tiny ill-defined centrilobular pulmonary  nodules not included on this preliminary report.     This report was signed and finalized on 4/26/2024 9:47 AM by Dr. Dayana Nielsen MD.             LAB RESULTS:      Lab 04/28/24  0539 04/26/24  0009 04/25/24 2059   WBC 8.79 8.53 8.21   HEMOGLOBIN 11.4* 11.6* 11.1*   HEMATOCRIT 37.1 37.5 35.3   PLATELETS 302 312 288   NEUTROS ABS  --   --  2.86   IMMATURE GRANS (ABS)  --   --  0.01   LYMPHS ABS  --   --  3.90*   MONOS ABS  --   --  0.92*   EOS ABS  --   --  0.47*   MCV 90.3 89.7 88.0   PROCALCITONIN  --   --  0.04         Lab 04/28/24  0539 04/26/24  0009 04/25/24 2059   SODIUM 137 142 142   POTASSIUM 3.5 3.9 3.2*   CHLORIDE 99 105 106   CO2 29.0 27.0 25.0   ANION GAP 9.0 10.0 11.0   BUN 12 8 8   CREATININE 0.97 0.73 0.66   EGFR 68.7 96.7 103.1   GLUCOSE 117* 179* 103*   CALCIUM 8.7 9.1 9.0   MAGNESIUM  --  1.8  --    HEMOGLOBIN A1C  --   --  5.70*             Lab 04/26/24  0009 04/25/24 2059   HSTROP T 13 12         Lab 04/25/24 2059   CHOLESTEROL 160   LDL CHOL 86   HDL CHOL 52   TRIGLYCERIDES 124             Brief Urine Lab Results  (Last result in the past 365 days)        Color   Clarity   Blood   Leuk Est   Nitrite   Protein   CREAT   Urine HCG        08/23/23 1411 Yellow   Clear   Negative   Trace   Negative   Negative                 Microbiology Results (last 10 days)       ** No results found for the last 240 hours. **            Hospital Course:   Vicki Barr is a 58-year-old female with a history of congestive heart failure, most recent echocardiogram performed Evan/20 9/2023 revealed an ejection fraction of 51-55.  Chronic pain syndrome, COPD, tobacco dependency.   "Patient recently had two angiographies in 2023, initial 1 performed in July due to NSTEMI and additional Scheduled angiography in August 2023, see below for cath catheterization and interventions.  Patient presented to the ED 9/2023 with atypical chest pain, negative workup in ED and discharged home.  Patient has been transferred today from [facility] with complaints of chest pain, negative troponins, BNP of 1406, D-dimer of 1200.     Upon admission to [facility] cardiac telemetry patient has no complaints of chest pain at this time, hypertensive at 174/82, afebrile, 97% on room air.  Patient states that she has had intermittent chest pain since her initial non-STEMI in July 2023 however in the last 24 hours she has had increasing pressure, states that it is \"elephant sitting on her chest\", left arm pain, with nausea, and increased shortness of breath.  Additionally states that she has had increasing shortness of breath over the last 1 to 2 weeks.  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  Initially did not seek treatment as she thought that she may be getting pneumonia.  She states she additionally started having a nonproductive cough in the morning, with nonproductive sputum.  Patient has no complaints of febrile episodes, palpitations, vomiting, or diarrhea.  Patient is very tearful during exam and is having obvious issues with increased stressed due to recent loss of job due to her illness, and then starting a new job which she is afraid she will use due to her condition.  Patient has multiple scabs all over her arms and legs, which she states is due to scratching from anxiety.  She additionally does not want condition discussed with her daughter, as she would to keep her condition to herself at this time.  When " "discussing medications, patient states that when she lost her insurance she did not take any of her cardiac medications nor her Plavix for approximately 2 months.  Has been on confocal medication regimen since the end of November.  Patient has decreased her smoking to 1/2 PPD, from 1 PPD  She initially began cardiac rehab but had to discontinue due to insurance.  However she is can try to keep up her exercise regimen at home.  Patient is weight aware that at this time her cardiac workup is nonacute, and if no additional chest pain or EKG changes, patient will have a Lexiscan stress in the a.m. Additional interventions to be evaluated post new lab work, CTA of the chest, and EKG.  Time patient will be admitted for continued evaluation and treatment..    The patient had CT of the chest which was negative for pulmonary embolism.  Doppler ultrasound of the leg was also negative for DVT.  She had a Lexiscan stress test which was high risk study.  She was reviewed by cardiology and had cardiac catheterization which revealed severe in-stent restenosis of previously placed left circumflex stent due to progression of atherosclerotic coronary artery disease.  She had angioplasty of this lesion with kissing balloon inflation.  Patient was free of chest pain and was eventually discharged in stable condition.  She was instructed to quit smoking and follow-up with cardiologist Dr. Patrick in 4 weeks.      Physical Exam on Discharge:  BP (!) 87/64 (BP Location: Right arm, Patient Position: Lying) Comment: RN aware  Pulse 68   Temp 97.7 °F (36.5 °C) (Oral)   Resp 17   Ht 152.4 cm (60\")   Wt 68.9 kg (152 lb)   SpO2 98%   BMI 29.69 kg/m²   Physical Exam  Constitutional:       General: She is not in acute distress.     Appearance: She is not ill-appearing or diaphoretic.   HENT:      Head: Normocephalic and atraumatic.      Right Ear: External ear normal.      Left Ear: External ear normal.      Nose: No congestion or " rhinorrhea.      Mouth/Throat:      Mouth: Mucous membranes are moist.      Pharynx: No oropharyngeal exudate or posterior oropharyngeal erythema.   Eyes:      General: No scleral icterus.     Extraocular Movements: Extraocular movements intact.      Conjunctiva/sclera: Conjunctivae normal.   Cardiovascular:      Rate and Rhythm: Normal rate and regular rhythm.      Heart sounds: Normal heart sounds. No murmur heard.  Pulmonary:      Effort: Pulmonary effort is normal. No respiratory distress.      Breath sounds: Normal breath sounds. No wheezing, rhonchi or rales.   Abdominal:      General: Abdomen is flat. There is no distension.      Palpations: Abdomen is soft.      Tenderness: There is no abdominal tenderness. There is no guarding.   Musculoskeletal:         General: No swelling, tenderness or deformity.      Cervical back: Neck supple. No rigidity. No muscular tenderness.      Right lower leg: No edema.      Left lower leg: No edema.   Lymphadenopathy:      Cervical: No cervical adenopathy.   Skin:     General: Skin is warm and dry.   Neurological:      General: No focal deficit present.      Mental Status: She is alert and oriented to person, place, and time.      Cranial Nerves: No cranial nerve deficit.      Motor: No weakness.   Psychiatric:         Mood and Affect: Mood normal.         Behavior: Behavior normal.         Thought Content: Thought content normal.       Condition on Discharge: Stable    Discharge Disposition:  Home or Self Care    Discharge Medications:     Discharge Medications        New Medications        Instructions Start Date   furosemide 20 MG tablet  Commonly known as: Lasix   20 mg, Oral, Daily      nitroglycerin 0.4 MG SL tablet  Commonly known as: Nitrostat   0.4 mg, Sublingual, Every 5 Minutes PRN, Take no more than 3 doses in 15 minutes.      potassium chloride 10 MEQ CR tablet   10 mEq, Oral, Daily             Changes to Medications        Instructions Start Date   atorvastatin  80 MG tablet  Commonly known as: LIPITOR  What changed:   medication strength  how much to take  Another medication with the same name was removed. Continue taking this medication, and follow the directions you see here.   80 mg, Oral, Nightly      spironolactone 25 MG tablet  Commonly known as: ALDACTONE  What changed: how much to take   12.5 mg, Oral, Daily             Continue These Medications        Instructions Start Date   albuterol sulfate  (90 Base) MCG/ACT inhaler  Commonly known as: PROVENTIL HFA;VENTOLIN HFA;PROAIR HFA   2 puffs, Inhalation, Every 4 Hours PRN      aspirin 81 MG EC tablet   81 mg, Oral, Daily      carvedilol 6.25 MG tablet  Commonly known as: COREG   6.25 mg, Oral, 2 Times Daily With Meals      clopidogrel 75 MG tablet  Commonly known as: PLAVIX   75 mg, Oral, Daily      Entresto 24-26 MG tablet  Generic drug: sacubitril-valsartan   1 tablet, Oral, 2 Times Daily      FLUoxetine 20 MG capsule  Commonly known as: PROzac   40 mg, Oral, Daily      gabapentin 600 MG tablet  Commonly known as: NEURONTIN   600 mg, Oral, 3 Times Daily      HYDROcodone-acetaminophen  MG per tablet  Commonly known as: NORCO   1 tablet, Oral, 4 Times Daily      isosorbide mononitrate 30 MG 24 hr tablet  Commonly known as: IMDUR   30 mg, Oral, Daily      pantoprazole 40 MG EC tablet  Commonly known as: PROTONIX   40 mg, Oral, 2 Times Daily      ranolazine 500 MG 12 hr tablet  Commonly known as: Ranexa   500 mg, Oral, 2 Times Daily      tiZANidine 4 MG tablet  Commonly known as: ZANAFLEX   4 mg, Oral, 2 Times Daily PRN      topiramate 50 MG tablet  Commonly known as: TOPAMAX   50 mg, Oral, Daily      vitamin D 1.25 MG (34451 UT) capsule capsule  Commonly known as: ERGOCALCIFEROL   50,000 Units, Oral, Weekly      zolpidem 10 MG tablet  Commonly known as: AMBIEN   10 mg, Oral, Nightly PRN               This patient does not have current or prior documentation of an left ventricular ejection fraction (LVEF)  of less than or equal to 40%.    Discharge instruction:  1.  Follow-up with your primary care provider within 1 week of discharge.  2.  Follow-up with cardiologist Dr. Patrick within 1 month of discharge.  3.  Quit smoking for your own health.     Discharge diet:   Diet Instructions       Diet: Cardiac Diets; Healthy Heart (2-3 Na+); Regular (IDDSI 7); Thin (IDDSI 0)      Discharge Diet: Cardiac Diets    Cardiac Diet: Healthy Heart (2-3 Na+)    Texture: Regular (IDDSI 7)    Fluid Consistency: Thin (IDDSI 0)            Activity at Discharge:   Activity Instructions       Activity as Tolerated              Follow-up Appointments:   No future appointments.    Test Results Pending at Discharge:     Electronically signed by Miguelito Sneed MD, 04/28/24, 10:30 CDT.    Time: 38 minutes of time was spent evaluating patient and planning discharge.

## 2024-04-28 NOTE — PROGRESS NOTES
[facility] Medicine Services  INPATIENT PROGRESS NOTE    Patient Name: Alannah Barr  Date of Admission: 4/25/2024  Today's Date: 04/27/24  Length of Stay: 0  Primary Care Physician: Coy Guillermo MD    Subjective   Chief Complaint: Chest pain    HPI   Patient denies chest pain at the time of my evaluation.  She is alert and oriented x 3 and is seen after cardiac catheterization with placement of a stent in the restenosed left circumflex and third obtuse marginal branch.  She was counseled to quit smoking cigarettes.  Patient's daughter was at bedside.    Review of Systems   All pertinent negatives and positives are as above. All other systems have been reviewed and are negative unless otherwise stated.     Objective    Temp:  [97.5 °F (36.4 °C)-98.2 °F (36.8 °C)] 97.5 °F (36.4 °C)  Heart Rate:  [55-88] 71  Resp:  [16-20] 17  BP: ()/(27-88) 96/56  Physical Exam  Constitutional:       General: She is not in acute distress.     Appearance: She is well-developed. She is not diaphoretic.   HENT:      Head: Normocephalic.   Eyes:      General: No scleral icterus.     Conjunctiva/sclera: Conjunctivae normal.      Pupils: Pupils are equal, round, and reactive to light.   Neck:      Thyroid: No thyromegaly.      Vascular: No JVD.      Trachea: No tracheal deviation.   Cardiovascular:      Rate and Rhythm: Normal rate and regular rhythm.      Heart sounds: Normal heart sounds. No murmur heard.     No friction rub. No gallop.   Pulmonary:      Effort: Pulmonary effort is normal. No respiratory distress.      Breath sounds: Normal breath sounds. No stridor. No wheezing or rales.   Chest:      Chest wall: No tenderness.   Abdominal:      General: Bowel sounds are normal. There is no distension.      Palpations: Abdomen is soft. There is no mass.      Tenderness: There is no abdominal tenderness. There is no guarding or rebound.      Hernia: No hernia is present.  "  Musculoskeletal:         General: No tenderness or deformity. Normal range of motion.      Cervical back: Normal range of motion and neck supple.      Right lower leg: No edema.      Left lower leg: No edema.   Lymphadenopathy:      Cervical: No cervical adenopathy.   Skin:     General: Skin is warm and dry.      Coloration: Skin is not pale.      Findings: No erythema or rash.   Neurological:      General: No focal deficit present.      Mental Status: She is alert and oriented to person, place, and time.      Cranial Nerves: No cranial nerve deficit.      Sensory: No sensory deficit.      Motor: No abnormal muscle tone.      Coordination: Coordination normal.   Psychiatric:         Behavior: Behavior normal.      Comments: Irritable mood.       Results Review:  I have reviewed the labs, radiology results, and diagnostic studies.    Laboratory Data:   Results from last 7 days   Lab Units 04/26/24  0009 04/25/24 2059   WBC 10*3/mm3 8.53 8.21   HEMOGLOBIN g/dL 11.6* 11.1*   HEMATOCRIT % 37.5 35.3   PLATELETS 10*3/mm3 312 288        Results from last 7 days   Lab Units 04/26/24  0009 04/25/24 2059   SODIUM mmol/L 142 142   POTASSIUM mmol/L 3.9 3.2*   CHLORIDE mmol/L 105 106   CO2 mmol/L 27.0 25.0   BUN mg/dL 8 8   CREATININE mg/dL 0.73 0.66   CALCIUM mg/dL 9.1 9.0   GLUCOSE mg/dL 179* 103*       Culture Data:   No results found for: \"BLOODCX\", \"URINECX\", \"WOUNDCX\", \"MRSACX\", \"RESPCX\", \"STOOLCX\"    Radiology Data:   Imaging Results (Last 24 Hours)       Procedure Component Value Units Date/Time    US Venous Doppler Lower Extremity Bilateral (duplex) [[id number]] Resulted: 04/27/24 1625     Updated: 04/27/24 1701          I have reviewed the patient's current medications.     Assessment/Plan   Assessment  Active Hospital Problems    Diagnosis     **Chest pain     Chronic diastolic CHF (congestive heart failure)     Elevated d-dimer     Anxiety disorder     Coronary artery disease of native artery of native heart with " stable angina pectoris     Chronic pain syndrome     COPD (chronic obstructive pulmonary disease)     Tobacco dependence      Treatment Plan  Cardiology input appreciated.  Will have cardiology consultation for further recommendations.  Continue aspirin, Plavix, Coreg, atorvastatin 80 mg nightly, Imdur, Entresto    Doppler ultrasound of the legs did not show any DVT to rule out DVT.    DVT prophylaxis with subcutaneous Lovenox    The patient remained stable plan to discharge in the next 24 hours    CODE STATUS is full code    Medical Decision Making  Number and Complexity of problems: 6  Differential Diagnosis: None    Conditions and Status        Condition is unchanged.     [facility] Data  External documents reviewed: None  Cardiac tracing (EKG, telemetry) interpretation: EKG reviewed by me.  Normal sinus rhythm  Radiology interpretation: CTA of the chest reviewed by me.  No pulmonary embolism.  Labs reviewed: CBC, BMP reviewed by me  Any tests that were considered but not ordered: None     Decision rules/scores evaluated (example [id number], [place], etc): None     Discussed with: Patient     Care Planning  Shared decision making: Patient  Code status and discussions: CODE STATUS is full code    Disposition  Social Determinants of Health that impact treatment or disposition: None  I expect the patient to be discharged to home in the next 24 hours  Electronically signed by Miguelito Sneed MD, 04/27/24, 19:05 CDT.

## 2024-04-28 NOTE — PROGRESS NOTES
"  [facility] PROGRESS NOTE     LOS: 0 days   Patient Care Team:  Coy Guillermo MD as PCP - General (Family Medicine)  Osman Patrick DO as Consulting Physician (Cardiology)    Chief Complaint:  chest pain    Subjective     No further chest pain or shortness of breath.  She says she feels much better today than yesterday.    Review of Systems:   A 10-point review of systems is obtained and negative except for otherwise mentioned above.    Objective     Vital Sign Min/Max for last 24 hours  Temp  Min: 97.5 °F (36.4 °C)  Max: 97.9 °F (36.6 °C)   BP  Min: 87/64  Max: 128/75   Pulse  Min: 68  Max: 77   Resp  Min: 17  Max: 18   SpO2  Min: 94 %  Max: 98 %   No data recorded   Weight  Min: 68.9 kg (152 lb)  Max: 68.9 kg (152 lb)     Flowsheet Rows      Flowsheet Row First Filed Value   Admission Height 152.4 cm (60\") Documented at 04/25/2024 2005   Admission Weight 69.1 kg (152 lb 6.4 oz) Documented at 04/25/2024 2005            Physical Exam:   General Appearance: NAD  Lungs: Clear  Heart: RRR, no m/r/g  Extremities: no edema, no hematoma at groin, no oozing or draining at groin, distal pulses intact     Results Review:     I reviewed the patient's new clinical results.    Results from last 7 days   Lab Units 04/28/24  0539   WBC 10*3/mm3 8.79   HEMOGLOBIN g/dL 11.4*   HEMATOCRIT % 37.1   PLATELETS 10*3/mm3 302     Results from last 7 days   Lab Units 04/28/24  0539   SODIUM mmol/L 137   POTASSIUM mmol/L 3.5   CHLORIDE mmol/L 99   CO2 mmol/L 29.0   BUN mg/dL 12   CREATININE mg/dL 0.97   GLUCOSE mg/dL 117*   CALCIUM mg/dL 8.7     Results from last 7 days   Lab Units 04/28/24  0539   SODIUM mmol/L 137   POTASSIUM mmol/L 3.5   CHLORIDE mmol/L 99   CO2 mmol/L 29.0   BUN mg/dL 12   CREATININE mg/dL 0.97   CALCIUM mg/dL 8.7   GLUCOSE mg/dL 117*     Results from last 7 days   Lab Units 04/26/24  0009 04/25/24 2059   HSTROP T ng/L 13 12         Results from last 7 days   Lab Units " 04/25/24  2059   CHOLESTEROL mg/dL 160   TRIGLYCERIDES mg/dL 124   HDL CHOL mg/dL 52   LDL CHOL mg/dL 86       Medication Review: yes    Assessment & Plan     Chest pain, now resolved  Abnormal nuclear stress testing  Coronary artery disease, s/p balloon angioplasty to OM3 and continuation of the Lcx, finally with kissing balloon inflation into both branches  Tobacco abuse  COPD  Primary Hypertension  Mixed hyperlipidemia  Chronic diastolic HF    Ok to discharge home from cardio standpoint.      Cardiac rehab at discharge.    Groin care instructions discussed with patient.    Follow up with Dr. Patrick in 4 weeks.    Continue Plavix, ASA, statin, Imdur, Ranexa, Coreg, Entresto, spironolactone, Lasix.    Nusrat Mccollum PA-C  04/28/24  08:26 CDT

## 2024-04-29 NOTE — OUTREACH NOTE
Prep Survey      Flowsheet Row Responses   [facility] patient discharged from? [place]   Is LACE score < 7 ? No   Eligibility Readm Mgmt   Discharge diagnosis Chest pain, heart cath with PTCA & stet   Does the patient have one of the following disease processes/diagnoses(primary or secondary)? Other   Does the patient have Home health ordered? No   Is there a DME ordered? No   Prep survey completed? Yes            Inga CERVANTES - Registered Nurse

## 2024-05-09 ENCOUNTER — READMISSION MANAGEMENT (OUTPATIENT)
Dept: CALL CENTER | Facility: HOSPITAL | Age: 59
End: 2024-05-09
Payer: COMMERCIAL

## 2024-05-14 ENCOUNTER — READMISSION MANAGEMENT (OUTPATIENT)
Dept: CALL CENTER | Facility: HOSPITAL | Age: 59
End: 2024-05-14
Payer: COMMERCIAL

## 2024-05-14 NOTE — OUTREACH NOTE
Medical Week 2 Survey      Flowsheet Row Responses   [facility] patient discharged from? [place]   Does the patient have one of the following disease processes/diagnoses(primary or secondary)? Other   Week 2 attempt successful? No   Unsuccessful attempts Attempt 2            Magda CORONADO - Licensed Nurse

## 2024-05-23 ENCOUNTER — READMISSION MANAGEMENT (OUTPATIENT)
Dept: CALL CENTER | Facility: HOSPITAL | Age: 59
End: 2024-05-23
Payer: COMMERCIAL

## 2024-05-23 NOTE — OUTREACH NOTE
Medical Week 3 Survey      Flowsheet Row Responses   [facility] patient discharged from? [place]   Does the patient have one of the following disease processes/diagnoses(primary or secondary)? Other   Week 3 attempt successful? No   Unsuccessful attempts Attempt 1   Revoke Decline to participate  [Unable to reach x 3 attempts.]            Mitra MUNOZ - Registered Nurse

## 2024-06-14 RX ORDER — CARVEDILOL 6.25 MG/1
6.25 TABLET ORAL 2 TIMES DAILY WITH MEALS
Qty: 60 TABLET | Refills: 0 | Status: SHIPPED | OUTPATIENT
Start: 2024-06-14

## 2024-06-21 RX ORDER — SACUBITRIL AND VALSARTAN 24; 26 MG/1; MG/1
1 TABLET, FILM COATED ORAL 2 TIMES DAILY
Qty: 60 TABLET | Refills: 0 | Status: SHIPPED | OUTPATIENT
Start: 2024-06-21

## 2024-06-21 RX ORDER — POTASSIUM CHLORIDE 750 MG/1
10 TABLET, FILM COATED, EXTENDED RELEASE ORAL DAILY
Qty: 30 TABLET | Refills: 0 | Status: SHIPPED | OUTPATIENT
Start: 2024-06-21

## 2024-06-21 RX ORDER — RANOLAZINE 500 MG/1
500 TABLET, EXTENDED RELEASE ORAL 2 TIMES DAILY
Qty: 60 TABLET | Refills: 0 | Status: SHIPPED | OUTPATIENT
Start: 2024-06-21

## 2024-06-21 RX ORDER — SPIRONOLACTONE 25 MG/1
TABLET ORAL
Qty: 15 TABLET | Refills: 0 | Status: SHIPPED | OUTPATIENT
Start: 2024-06-21

## 2024-07-19 NOTE — TELEPHONE ENCOUNTER
CALLED PT TO LET HER KNOW WE COULD NOT DO THE PAPERWORK SHE DROPPED OFF BECAUSE IT NEEDS A FUNCTIONAL CAPACITY EVALUATION, RECOMMENDED SHE REACH OUT TO HER PRIMARY CARE FOR THIS.  ALSO INFORMED PT WE WILL REIMBURSE HER FOR PAYMENT FOR FORMS   
General

## 2024-07-24 RX ORDER — POTASSIUM CHLORIDE 750 MG/1
10 TABLET, FILM COATED, EXTENDED RELEASE ORAL DAILY
Qty: 23 TABLET | Refills: 0 | Status: SHIPPED | OUTPATIENT
Start: 2024-07-24

## 2024-07-24 RX ORDER — SACUBITRIL AND VALSARTAN 24; 26 MG/1; MG/1
1 TABLET, FILM COATED ORAL 2 TIMES DAILY
Qty: 45 TABLET | Refills: 0 | Status: SHIPPED | OUTPATIENT
Start: 2024-07-24

## 2024-07-24 RX ORDER — SPIRONOLACTONE 25 MG/1
12.5 TABLET ORAL EVERY OTHER DAY
Qty: 6 TABLET | Refills: 0 | Status: SHIPPED | OUTPATIENT
Start: 2024-07-24

## 2024-07-24 RX ORDER — FUROSEMIDE 20 MG/1
20 TABLET ORAL DAILY
Qty: 23 TABLET | Refills: 0 | Status: SHIPPED | OUTPATIENT
Start: 2024-07-24

## 2024-07-24 RX ORDER — ATORVASTATIN CALCIUM 80 MG/1
80 TABLET, FILM COATED ORAL NIGHTLY
Qty: 23 TABLET | Refills: 0 | Status: SHIPPED | OUTPATIENT
Start: 2024-07-24

## 2024-07-24 RX ORDER — RANOLAZINE 500 MG/1
500 TABLET, EXTENDED RELEASE ORAL 2 TIMES DAILY
Qty: 45 TABLET | Refills: 0 | Status: SHIPPED | OUTPATIENT
Start: 2024-07-24

## 2024-08-15 ENCOUNTER — OFFICE VISIT (OUTPATIENT)
Dept: CARDIOLOGY | Facility: CLINIC | Age: 59
End: 2024-08-15
Payer: COMMERCIAL

## 2024-08-15 VITALS
HEIGHT: 60 IN | OXYGEN SATURATION: 99 % | HEART RATE: 72 BPM | SYSTOLIC BLOOD PRESSURE: 120 MMHG | BODY MASS INDEX: 29.84 KG/M2 | DIASTOLIC BLOOD PRESSURE: 62 MMHG | WEIGHT: 152 LBS

## 2024-08-15 DIAGNOSIS — F17.200 TOBACCO DEPENDENCE: ICD-10-CM

## 2024-08-15 DIAGNOSIS — J44.9 CHRONIC OBSTRUCTIVE PULMONARY DISEASE, UNSPECIFIED COPD TYPE: ICD-10-CM

## 2024-08-15 DIAGNOSIS — Z91.199 HISTORY OF NONCOMPLIANCE WITH MEDICAL TREATMENT: ICD-10-CM

## 2024-08-15 DIAGNOSIS — I50.32 CHRONIC DIASTOLIC CHF (CONGESTIVE HEART FAILURE): ICD-10-CM

## 2024-08-15 DIAGNOSIS — I25.110 CORONARY ARTERY DISEASE INVOLVING NATIVE CORONARY ARTERY OF NATIVE HEART WITH UNSTABLE ANGINA PECTORIS: Primary | ICD-10-CM

## 2024-08-15 DIAGNOSIS — I20.89 STABLE ANGINA PECTORIS: ICD-10-CM

## 2024-08-15 RX ORDER — SACUBITRIL AND VALSARTAN 24; 26 MG/1; MG/1
1 TABLET, FILM COATED ORAL 2 TIMES DAILY
Qty: 180 TABLET | Refills: 3 | Status: SHIPPED | OUTPATIENT
Start: 2024-08-15

## 2024-08-15 RX ORDER — FUROSEMIDE 40 MG/1
40 TABLET ORAL DAILY
Qty: 90 TABLET | Refills: 3 | Status: SHIPPED | OUTPATIENT
Start: 2024-08-15

## 2024-08-15 RX ORDER — ISOSORBIDE MONONITRATE 30 MG/1
30 TABLET, EXTENDED RELEASE ORAL DAILY
Qty: 90 TABLET | Refills: 3 | Status: SHIPPED | OUTPATIENT
Start: 2024-08-15

## 2024-08-15 RX ORDER — RANOLAZINE 500 MG/1
500 TABLET, EXTENDED RELEASE ORAL 2 TIMES DAILY
Qty: 180 TABLET | Refills: 3 | Status: SHIPPED | OUTPATIENT
Start: 2024-08-15

## 2024-08-15 RX ORDER — POTASSIUM CHLORIDE 750 MG/1
10 TABLET, FILM COATED, EXTENDED RELEASE ORAL DAILY
Qty: 90 TABLET | Refills: 3 | Status: SHIPPED | OUTPATIENT
Start: 2024-08-15

## 2024-08-15 RX ORDER — ATORVASTATIN CALCIUM 80 MG/1
80 TABLET, FILM COATED ORAL NIGHTLY
Qty: 90 TABLET | Refills: 3 | Status: SHIPPED | OUTPATIENT
Start: 2024-08-15

## 2024-08-15 RX ORDER — SPIRONOLACTONE 25 MG/1
12.5 TABLET ORAL EVERY OTHER DAY
Qty: 45 TABLET | Refills: 1 | Status: SHIPPED | OUTPATIENT
Start: 2024-08-15

## 2024-08-15 RX ORDER — ASPIRIN 81 MG/1
81 TABLET ORAL DAILY
Qty: 90 TABLET | Refills: 3 | Status: SHIPPED | OUTPATIENT
Start: 2024-08-15

## 2024-08-15 RX ORDER — CARVEDILOL 6.25 MG/1
6.25 TABLET ORAL 2 TIMES DAILY WITH MEALS
Qty: 180 TABLET | Refills: 3 | Status: SHIPPED | OUTPATIENT
Start: 2024-08-15

## 2024-08-15 RX ORDER — CLOPIDOGREL BISULFATE 75 MG/1
75 TABLET ORAL DAILY
Qty: 90 TABLET | Refills: 3 | Status: SHIPPED | OUTPATIENT
Start: 2024-08-15

## 2024-08-18 NOTE — PROGRESS NOTES
Subjective:     Encounter Date:08/15/2024      Patient ID: Alannah Barr is a 58 y.o. female.    Chief Complaint:  History of Present Illness  History of Present Illness  The patient presents for evaluation of coronary artery disease. She is accompanied by an adult female.    She reports fluctuating health, with good and bad days. She has reduced her smoking from 2 to 3 packs a day to half a pack, following a heart attack that caused severe breathlessness. She has considered vaping as an alternative to smoking. She has not used marijuana since she was 20 years old.    She underwent a heart catheterization in 05/2024, during which two stents were placed. An echocardiogram was performed on 08/25/2024 by Dr. Cespedes, who also prescribed hydrocodone. She continues to take Plavix, but has stopped taking aspirin for the past 4 to 5 months. Her daily medications include Lipitor, Coreg, Entresto, Lasix 20, potassium, Ranexa, spironolactone, and clopidogrel. She takes half a tablet of spironolactone every other day.    She experiences anxiety at night, fearing she will stop breathing if she lies down. This fear sometimes escalates to panic attacks. She occasionally experiences chest pain, but has not used nitroglycerin, instead opting for aspirin. The severity of her chest pain varies, with some episodes being so intense that she considers going to the hospital. The most recent severe episode occurred within the last 2 weeks. She has become accustomed to the chest pain.    She has been struggling with fluid retention, despite working 5 days a week. She weighs herself daily and has noticed a decrease from 156 to 152 pounds. She has experienced episodes of low blood pressure, with one instance dropping to 52/38, causing weakness, particularly in her legs. She has had several falls due to dizziness and lightheadedness, which she attributes to low oxygen levels.    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    FAMILY HISTORY  Her mother is in the fourth stage of COPD. Her father  at the age of 60. Her paternal grandmother  at the age of 59.        Review of Systems   Constitutional: Positive for malaise/fatigue. Negative for diaphoresis and fever.   HENT:  Negative for congestion.    Eyes:  Negative for vision loss in left eye and vision loss in right eye.   Cardiovascular:  Positive for chest pain. Negative for claudication, dyspnea on exertion, irregular heartbeat, leg swelling, orthopnea, palpitations and syncope.   Respiratory:  Positive for shortness of breath. Negative for cough and wheezing.    Hematologic/Lymphatic: Negative for adenopathy.   Skin:  Negative for rash.   Musculoskeletal:  Negative for joint pain and joint swelling.   Gastrointestinal:  Negative for abdominal pain, diarrhea, nausea and vomiting.   Neurological:  Negative for excessive daytime sleepiness, dizziness, focal weakness, light-headedness, numbness and weakness.   Psychiatric/Behavioral:  Negative for depression. The patient is nervous/anxious. The patient does not have insomnia.            Current Outpatient Medications:     albuterol sulfate  (90 Base) MCG/ACT inhaler, Inhale 2 puffs Every 4 (Four) Hours As Needed for Wheezing., Disp: , Rfl:     aspirin 81 MG EC tablet, Take 1 tablet by mouth Daily., Disp: 90 tablet, Rfl: 3    atorvastatin (LIPITOR) 80 MG tablet, Take 1 tablet by mouth Every Night., Disp: 90 tablet, Rfl: 3    carvedilol (COREG) 6.25 MG tablet, Take 1 tablet by mouth 2 (Two) Times a Day With Meals., Disp: 180 tablet, Rfl: 3    FLUoxetine (PROzac) 20 MG capsule, Take 2 capsules by mouth Daily., Disp: , Rfl:     gabapentin (NEURONTIN) 600 MG tablet, Take 1 tablet by mouth 3 (Three) Times a Day., Disp: , Rfl:     HYDROcodone-acetaminophen (NORCO)  MG per tablet, Take 1 tablet by mouth 4 (Four) Times a Day., Disp: , Rfl:     isosorbide mononitrate (IMDUR) 30 MG 24 hr  tablet, Take 1 tablet by mouth Daily., Disp: 90 tablet, Rfl: 3    nitroglycerin (Nitrostat) 0.4 MG SL tablet, Place 1 tablet under the tongue Every 5 (Five) Minutes As Needed for Chest Pain. Take no more than 3 doses in 15 minutes., Disp: 50 tablet, Rfl: 1    pantoprazole (PROTONIX) 40 MG EC tablet, Take 1 tablet by mouth 2 (Two) Times a Day., Disp: , Rfl:     potassium chloride 10 MEQ CR tablet, Take 1 tablet by mouth Daily., Disp: 90 tablet, Rfl: 3    ranolazine (RANEXA) 500 MG 12 hr tablet, Take 1 tablet by mouth 2 (Two) Times a Day., Disp: 180 tablet, Rfl: 3    sacubitril-valsartan (Entresto) 24-26 MG tablet, Take 1 tablet by mouth 2 (Two) Times a Day., Disp: 180 tablet, Rfl: 3    spironolactone (ALDACTONE) 25 MG tablet, Take 0.5 tablets by mouth Every Other Day., Disp: 45 tablet, Rfl: 1    tiZANidine (ZANAFLEX) 4 MG tablet, Take 1 tablet by mouth 2 (Two) Times a Day As Needed for Muscle Spasms., Disp: , Rfl:     topiramate (TOPAMAX) 50 MG tablet, Take 1 tablet by mouth Daily., Disp: , Rfl:     vitamin D (ERGOCALCIFEROL) 1.25 MG (66696 UT) capsule capsule, Take 1 capsule by mouth 1 (One) Time Per Week., Disp: , Rfl:     zolpidem (AMBIEN) 10 MG tablet, Take 1 tablet by mouth At Night As Needed for Sleep., Disp: , Rfl:     clopidogrel (PLAVIX) 75 MG tablet, Take 1 tablet by mouth Daily., Disp: 90 tablet, Rfl: 3    furosemide (LASIX) 40 MG tablet, Take 1 tablet by mouth Daily., Disp: 90 tablet, Rfl: 3       Objective:      Vitals:    08/15/24 1448   BP: 120/62   Pulse: 72   SpO2: 99%     Vitals and nursing note reviewed.   Constitutional:       Appearance: Normal and healthy appearance. Well-developed and not in distress.   Eyes:      Extraocular Movements: Extraocular movements intact.      Pupils: Pupils are equal, round, and reactive to light.   HENT:      Head: Normocephalic and atraumatic.    Mouth/Throat:      Pharynx: Oropharynx is clear.   Neck:      Vascular: JVD normal.      Trachea: Trachea normal.    Pulmonary:      Effort: Pulmonary effort is normal.      Breath sounds: Normal breath sounds. No wheezing. No rhonchi. No rales.   Cardiovascular:      PMI at left midclavicular line. Normal rate. Regular rhythm. Normal S1. Normal S2.       Murmurs: There is a grade 2/6 systolic murmur.      No gallop.  No click. No rub.   Pulses:     Dorsalis pedis: 2+ bilaterally.     Posterior tibial: 2+ bilaterally.  Abdominal:      General: Bowel sounds are normal.      Palpations: Abdomen is soft.      Tenderness: There is no abdominal tenderness.   Musculoskeletal: Normal range of motion.      Cervical back: Normal range of motion and neck supple. Skin:     General: Skin is warm and dry.      Capillary Refill: Capillary refill takes less than 2 seconds.   Feet:      Right foot:      Skin integrity: Skin integrity normal.      Left foot:      Skin integrity: Skin integrity normal.   Neurological:      Mental Status: Alert and oriented to person, place and time.      Sensory: Sensation is intact.      Motor: Motor function is intact.      Coordination: Coordination is intact.   Psychiatric:         Speech: Speech normal.         Behavior: Behavior is cooperative.       Physical Exam      Lab Review:       Procedures  Results  Imaging  Echocardiogram showed potential issues with heart pumping efficiency.    Assessment/Plan:     Problem List Items Addressed This Visit       COPD (chronic obstructive pulmonary disease)    Tobacco dependence    Chest pain due to myocardial ischemia    CAD (coronary artery disease) - Primary    Relevant Medications    clopidogrel (PLAVIX) 75 MG tablet    carvedilol (COREG) 6.25 MG tablet    sacubitril-valsartan (Entresto) 24-26 MG tablet    isosorbide mononitrate (IMDUR) 30 MG 24 hr tablet    ranolazine (RANEXA) 500 MG 12 hr tablet    Other Relevant Orders    Case Request Cath Lab: Left Heart Cath (Completed)    Chronic diastolic CHF (congestive heart failure)    Relevant Medications     clopidogrel (PLAVIX) 75 MG tablet    carvedilol (COREG) 6.25 MG tablet    sacubitril-valsartan (Entresto) 24-26 MG tablet    isosorbide mononitrate (IMDUR) 30 MG 24 hr tablet    ranolazine (RANEXA) 500 MG 12 hr tablet    History of noncompliance with medical treatment    Relevant Orders    Case Request Cath Lab: Left Heart Cath (Completed)     Assessment & Plan  1. Coronary artery disease, native with refractory unstable angina.  Symptoms and history suggest a possible issue with one of her stents. A daily regimen of baby aspirin 81 mg was recommended, with emphasis on not missing any doses. The dosage of Lasix was increased to 40 mg to manage fluid retention. She was advised to continue taking half a tablet of spironolactone every other day. A prescription for all her medications was provided, with instructions to take them daily. She was also advised to quit smoking and consider vaping as an alternative. A heart catheterization was scheduled for 08/27/2024 to assess the condition of her stents. If she experiences chest pain similar to previous episodes, she is to contact the office immediately.    2. Anxiety.  She reported experiencing anxiety, particularly at night, which sometimes leads to panic attacks. She was advised to consider non-pharmacological methods such as mindfulness and relaxation techniques. If symptoms persist, further evaluation and potential treatment options will be considered.    3. Medication Management.  Refills for potassium, Ranexa, spironolactone, and Plavix were provided for 90 days with three refills. She was instructed to ensure she takes all her medications daily as prescribed.          Recommendations/plans:    Transcribed from ambient dictation for Osman Patrick DO by Osman Patrick DO.  08/18/24   14:33 CDT    Patient or patient representative verbalized consent for the use of Ambient Listening during the visit with  Osman Patrick DO for chart documentation.  8/18/2024  14:35 CDT

## 2024-08-20 RX ORDER — FUROSEMIDE 20 MG/1
20 TABLET ORAL DAILY
Qty: 30 TABLET | Refills: 0 | OUTPATIENT
Start: 2024-08-20

## 2024-08-21 RX ORDER — FUROSEMIDE 20 MG/1
20 TABLET ORAL DAILY
Refills: 0 | OUTPATIENT
Start: 2024-08-21

## 2024-08-23 ENCOUNTER — APPOINTMENT (OUTPATIENT)
Dept: GENERAL RADIOLOGY | Facility: HOSPITAL | Age: 59
End: 2024-08-23
Payer: COMMERCIAL

## 2024-08-23 ENCOUNTER — HOSPITAL ENCOUNTER (EMERGENCY)
Facility: HOSPITAL | Age: 59
Discharge: HOME | End: 2024-08-23
Attending: FAMILY MEDICINE
Payer: COMMERCIAL

## 2024-08-23 VITALS
DIASTOLIC BLOOD PRESSURE: 80 MMHG | SYSTOLIC BLOOD PRESSURE: 139 MMHG | BODY MASS INDEX: 29.82 KG/M2 | HEIGHT: 60 IN | HEART RATE: 70 BPM | WEIGHT: 151.9 LBS | RESPIRATION RATE: 19 BRPM | TEMPERATURE: 97.8 F | OXYGEN SATURATION: 99 %

## 2024-08-23 DIAGNOSIS — R07.89 ATYPICAL CHEST PAIN: ICD-10-CM

## 2024-08-23 DIAGNOSIS — R06.02 SHORTNESS OF BREATH: Primary | ICD-10-CM

## 2024-08-23 LAB
ALBUMIN SERPL-MCNC: 3.9 G/DL (ref 3.5–5.2)
ALBUMIN/GLOB SERPL: 1.1 G/DL
ALP SERPL-CCNC: 125 U/L (ref 39–117)
ALT SERPL W P-5'-P-CCNC: 10 U/L (ref 1–33)
ANION GAP SERPL CALCULATED.3IONS-SCNC: 9 MMOL/L (ref 5–15)
APTT PPP: 30.6 SECONDS (ref 24.5–36)
AST SERPL-CCNC: 18 U/L (ref 1–32)
BASOPHILS # BLD AUTO: 0.02 10*3/MM3 (ref 0–0.2)
BASOPHILS NFR BLD AUTO: 0.2 % (ref 0–1.5)
BILIRUB SERPL-MCNC: 0.2 MG/DL (ref 0–1.2)
BUN SERPL-MCNC: 10 MG/DL (ref 6–20)
BUN/CREAT SERPL: 10.8 (ref 7–25)
CALCIUM SPEC-SCNC: 9.1 MG/DL (ref 8.6–10.5)
CHLORIDE SERPL-SCNC: 107 MMOL/L (ref 98–107)
CK SERPL-CCNC: 109 U/L (ref 20–180)
CO2 SERPL-SCNC: 27 MMOL/L (ref 22–29)
CREAT SERPL-MCNC: 0.93 MG/DL (ref 0.57–1)
D-LACTATE SERPL-SCNC: 1.2 MMOL/L (ref 0.5–2)
DEPRECATED RDW RBC AUTO: 51.2 FL (ref 37–54)
EGFRCR SERPLBLD CKD-EPI 2021: 71.8 ML/MIN/1.73
EOSINOPHIL # BLD AUTO: 0.32 10*3/MM3 (ref 0–0.4)
EOSINOPHIL NFR BLD AUTO: 3.6 % (ref 0.3–6.2)
ERYTHROCYTE [DISTWIDTH] IN BLOOD BY AUTOMATED COUNT: 16.4 % (ref 12.3–15.4)
GEN 5 2HR TROPONIN T REFLEX: 8 NG/L
GLOBULIN UR ELPH-MCNC: 3.4 GM/DL
GLUCOSE SERPL-MCNC: 122 MG/DL (ref 65–99)
HCT VFR BLD AUTO: 34.5 % (ref 34–46.6)
HGB BLD-MCNC: 10.4 G/DL (ref 12–15.9)
HOLD SPECIMEN: NORMAL
IMM GRANULOCYTES # BLD AUTO: 0.03 10*3/MM3 (ref 0–0.05)
IMM GRANULOCYTES NFR BLD AUTO: 0.3 % (ref 0–0.5)
INR PPP: 0.92 (ref 0.91–1.09)
LYMPHOCYTES # BLD AUTO: 2.52 10*3/MM3 (ref 0.7–3.1)
LYMPHOCYTES NFR BLD AUTO: 28.5 % (ref 19.6–45.3)
MAGNESIUM SERPL-MCNC: 2 MG/DL (ref 1.6–2.6)
MCH RBC QN AUTO: 25.6 PG (ref 26.6–33)
MCHC RBC AUTO-ENTMCNC: 30.1 G/DL (ref 31.5–35.7)
MCV RBC AUTO: 85 FL (ref 79–97)
MONOCYTES # BLD AUTO: 0.65 10*3/MM3 (ref 0.1–0.9)
MONOCYTES NFR BLD AUTO: 7.3 % (ref 5–12)
NEUTROPHILS NFR BLD AUTO: 5.31 10*3/MM3 (ref 1.7–7)
NEUTROPHILS NFR BLD AUTO: 60.1 % (ref 42.7–76)
NRBC BLD AUTO-RTO: 0 /100 WBC (ref 0–0.2)
NT-PROBNP SERPL-MCNC: 221.2 PG/ML (ref 0–900)
PLATELET # BLD AUTO: 303 10*3/MM3 (ref 140–450)
PMV BLD AUTO: 11.2 FL (ref 6–12)
POTASSIUM SERPL-SCNC: 4 MMOL/L (ref 3.5–5.2)
PROT SERPL-MCNC: 7.3 G/DL (ref 6–8.5)
PROTHROMBIN TIME: 12.7 SECONDS (ref 11.8–14.8)
QT INTERVAL: 394 MS
QTC INTERVAL: 425 MS
RBC # BLD AUTO: 4.06 10*6/MM3 (ref 3.77–5.28)
SODIUM SERPL-SCNC: 143 MMOL/L (ref 136–145)
TROPONIN T DELTA: -1 NG/L
TROPONIN T SERPL HS-MCNC: 9 NG/L
WBC NRBC COR # BLD AUTO: 8.85 10*3/MM3 (ref 3.4–10.8)
WHOLE BLOOD HOLD COAG: NORMAL
WHOLE BLOOD HOLD SPECIMEN: NORMAL

## 2024-08-23 PROCEDURE — 80053 COMPREHEN METABOLIC PANEL: CPT | Performed by: FAMILY MEDICINE

## 2024-08-23 PROCEDURE — 83735 ASSAY OF MAGNESIUM: CPT | Performed by: FAMILY MEDICINE

## 2024-08-23 PROCEDURE — 71045 X-RAY EXAM CHEST 1 VIEW: CPT

## 2024-08-23 PROCEDURE — 96376 TX/PRO/DX INJ SAME DRUG ADON: CPT

## 2024-08-23 PROCEDURE — 93005 ELECTROCARDIOGRAM TRACING: CPT | Performed by: FAMILY MEDICINE

## 2024-08-23 PROCEDURE — 84484 ASSAY OF TROPONIN QUANT: CPT | Performed by: FAMILY MEDICINE

## 2024-08-23 PROCEDURE — 85610 PROTHROMBIN TIME: CPT | Performed by: FAMILY MEDICINE

## 2024-08-23 PROCEDURE — 83880 ASSAY OF NATRIURETIC PEPTIDE: CPT | Performed by: FAMILY MEDICINE

## 2024-08-23 PROCEDURE — 83605 ASSAY OF LACTIC ACID: CPT | Performed by: FAMILY MEDICINE

## 2024-08-23 PROCEDURE — 99284 EMERGENCY DEPT VISIT MOD MDM: CPT

## 2024-08-23 PROCEDURE — 85730 THROMBOPLASTIN TIME PARTIAL: CPT | Performed by: FAMILY MEDICINE

## 2024-08-23 PROCEDURE — 82550 ASSAY OF CK (CPK): CPT | Performed by: FAMILY MEDICINE

## 2024-08-23 PROCEDURE — 85025 COMPLETE CBC W/AUTO DIFF WBC: CPT | Performed by: FAMILY MEDICINE

## 2024-08-23 PROCEDURE — 36415 COLL VENOUS BLD VENIPUNCTURE: CPT

## 2024-08-23 RX ORDER — SODIUM CHLORIDE 0.9 % (FLUSH) 0.9 %
10 SYRINGE (ML) INJECTION AS NEEDED
Status: DISCONTINUED | OUTPATIENT
Start: 2024-08-23 | End: 2024-08-23 | Stop reason: HOSPADM

## 2024-08-23 RX ORDER — NITROGLYCERIN 0.4 MG/1
0.4 TABLET SUBLINGUAL
Qty: 50 TABLET | Refills: 0 | Status: SHIPPED | OUTPATIENT
Start: 2024-08-23

## 2024-08-23 RX ORDER — NITROGLYCERIN 0.4 MG/1
0.4 TABLET SUBLINGUAL ONCE
Status: COMPLETED | OUTPATIENT
Start: 2024-08-23 | End: 2024-08-23

## 2024-08-23 RX ORDER — NITROGLYCERIN 0.4 MG/1
0.4 TABLET SUBLINGUAL
Qty: 50 TABLET | Refills: 0 | Status: SHIPPED | OUTPATIENT
Start: 2024-08-23 | End: 2024-08-23

## 2024-08-23 RX ADMIN — NITROGLYCERIN 0.4 MG: 0.4 TABLET SUBLINGUAL at 18:44

## 2024-08-23 RX ADMIN — NITROGLYCERIN 0.5 INCH: 20 OINTMENT TOPICAL at 20:01

## 2024-08-23 NOTE — Clinical Note
[facility] EMERGENCY DEPARTMENT  [address] [place] [address] AVE  [facility] [address]  Phone: [phone]    Alannah Barr was seen and treated in our emergency department on 8/23/2024.  She may return to work on 08/24/2024.         Thank you for choosing [facility].    Nusrat Toussaint RN

## 2024-08-24 NOTE — ED PROVIDER NOTES
Patient's troponin is negative x 2.  Discussed the results of her laboratory radiological test with the patient.  Patient has an upcoming appointment with her cardiologist Dr. Patrick HPI:     Patient is a 59-year-old white female presents to the emergency room with a complaint of having substernal chest pain radiating to her left shoulder.  Patient states that started at noon today.  Patient states when it started it was about 8 out of 10.  She states she gets shortness of breath with this.  She eventually called EMS and and route they gave her 1 sublingual nitroglycerin.  She states that took her pain from 8 out of 10 to 4 out of 10.  Prior to EMS arrival she had taken a full dose aspirin 324.  Patient states that she has a past history of 3 stents being placed in 2 stents being occluded that were reopened in May 2024.      REVIEW OF SYSTEMS      CONSTITUTIONAL:  No complaints of fever, chills,or weakness  EYES:  No complaints of discharge   ENT: No complaints of sore throat or ear pain  CARDIOVASCULAR: The for substernal chest pain rating to the left shoulder RESPIRATORY: Positive for shortness of breath   GI:  No complaints of abdominal pain, nausea, vomiting, or diarrhea  MUSCULOSKELETAL:  No complaints of back pain  SKIN:  No complaints of rash  NEUROLOGIC:  No complaints of headache, focal weakness, or sensory changes  ENDOCRINE:  No complaints of polyuria or polydipsia  LYMPHATIC:  No complaints of swollen glands  GENITOURINARY: No complaints of urinary frequency or hematuria        PAST MEDICAL HISTORY  Past Medical History:   Diagnosis Date    CHF (congestive heart failure)     Chronic pain syndrome     COPD (chronic obstructive pulmonary disease)     Tobacco dependence        FAMILY HISTORY  Family History   Problem Relation Age of Onset    COPD Mother     Diabetes Father     Heart failure Father     BRCA 1/2 Neg Hx        SOCIAL HISTORY  Social History     Socioeconomic History    Marital status: Single    Tobacco Use    Smoking status: Every Day     Current packs/day: 1.00     Average packs/day: 1 pack/day for 51.0 years (51.0 ttl pk-yrs)     Types: Cigarettes    Smokeless tobacco: Never   Vaping Use    Vaping status: Never Used   Substance and Sexual Activity    Alcohol use: Not Currently    Drug use: Not Currently    Sexual activity: Defer       IMMUNIZATION HISTORY  Deferred to primary care physician.    SURGICAL HISTORY  Past Surgical History:   Procedure Laterality Date    BREAST BIOPSY Right     CARDIAC CATHETERIZATION N/A 07/29/2023    Procedure: Left Heart Cath;  Surgeon: Osman Patrick DO;  Location:  [facility];  Service: Cardiovascular;  Laterality: N/A;    CARDIAC CATHETERIZATION N/A 08/17/2023    Procedure: Percutaneous Coronary Intervention;  Surgeon: Osman Patrick DO;  Location:  [facility];  Service: Cardiovascular;  Laterality: N/A;    CARDIAC CATHETERIZATION N/A 4/27/2024    Procedure: Left Heart Cath, coronaries, grafts, possible;  Surgeon: Win Ann MD;  Location:  [facility];  Service: Cardiology;  Laterality: N/A;    CHOLECYSTECTOMY      CORONARY STENT PLACEMENT  2023    X 2    HYSTERECTOMY      KNEE ARTHROSCOPY W/ MEDIAL COLLATERAL LIGAMENT (MCL) REPAIR Right     x 2    TONSILLECTOMY         CURRENT MEDICATIONS    Current Facility-Administered Medications:     [COMPLETED] Insert Peripheral IV, , , Once **AND** sodium chloride 0.9 % flush 10 mL, 10 mL, Intravenous, PRN, Telly Pruett Jr., MD    Current Outpatient Medications:     nitroglycerin (Nitrostat) 0.4 MG SL tablet, Place 1 tablet under the tongue Every 5 (Five) Minutes As Needed for Chest Pain. Take no more than 3 doses in 15 minutes., Disp: 50 tablet, Rfl: 0    albuterol sulfate  (90 Base) MCG/ACT inhaler, Inhale 2 puffs Every 4 (Four) Hours As Needed for Wheezing., Disp: , Rfl:     aspirin 81 MG EC tablet, Take 1 tablet by mouth  Daily., Disp: 90 tablet, Rfl: 3    atorvastatin (LIPITOR) 80 MG tablet, Take 1 tablet by mouth Every Night., Disp: 90 tablet, Rfl: 3    carvedilol (COREG) 6.25 MG tablet, Take 1 tablet by mouth 2 (Two) Times a Day With Meals., Disp: 180 tablet, Rfl: 3    clopidogrel (PLAVIX) 75 MG tablet, Take 1 tablet by mouth Daily., Disp: 90 tablet, Rfl: 3    FLUoxetine (PROzac) 20 MG capsule, Take 2 capsules by mouth Daily., Disp: , Rfl:     furosemide (LASIX) 40 MG tablet, Take 1 tablet by mouth Daily., Disp: 90 tablet, Rfl: 3    gabapentin (NEURONTIN) 600 MG tablet, Take 1 tablet by mouth 3 (Three) Times a Day., Disp: , Rfl:     HYDROcodone-acetaminophen (NORCO)  MG per tablet, Take 1 tablet by mouth 4 (Four) Times a Day., Disp: , Rfl:     isosorbide mononitrate (IMDUR) 30 MG 24 hr tablet, Take 1 tablet by mouth Daily., Disp: 90 tablet, Rfl: 3    pantoprazole (PROTONIX) 40 MG EC tablet, Take 1 tablet by mouth 2 (Two) Times a Day., Disp: , Rfl:     potassium chloride 10 MEQ CR tablet, Take 1 tablet by mouth Daily., Disp: 90 tablet, Rfl: 3    ranolazine (RANEXA) 500 MG 12 hr tablet, Take 1 tablet by mouth 2 (Two) Times a Day., Disp: 180 tablet, Rfl: 3    sacubitril-valsartan (Entresto) 24-26 MG tablet, Take 1 tablet by mouth 2 (Two) Times a Day., Disp: 180 tablet, Rfl: 3    spironolactone (ALDACTONE) 25 MG tablet, Take 0.5 tablets by mouth Every Other Day., Disp: 45 tablet, Rfl: 1    tiZANidine (ZANAFLEX) 4 MG tablet, Take 1 tablet by mouth 2 (Two) Times a Day As Needed for Muscle Spasms., Disp: , Rfl:     topiramate (TOPAMAX) 50 MG tablet, Take 1 tablet by mouth Daily., Disp: , Rfl:     vitamin D (ERGOCALCIFEROL) 1.25 MG (84060 UT) capsule capsule, Take 1 capsule by mouth 1 (One) Time Per Week., Disp: , Rfl:     zolpidem (AMBIEN) 10 MG tablet, Take 1 tablet by mouth At Night As Needed for Sleep., Disp: , Rfl:     ALLERGIES  Allergies   Allergen Reactions    Levaquin [Levofloxacin] Shortness Of Breath    Morphine Shortness  "Of Breath    Codeine Unknown - High Severity    Doxycycline Unknown - High Severity    Metronidazole Unknown - High Severity    Naproxen Unknown - High Severity    Propoxyphene Unknown - High Severity    Tetanus Toxoids Unknown - High Severity    Tetracyclines & Related Unknown - High Severity           Cardiac exam    VITAL SIGNS:  /80   Pulse 76   Temp 97.7 °F (36.5 °C) (Oral)   Resp 16   Ht 152.4 cm (60\")   Wt 68.9 kg (151 lb 14.4 oz)   SpO2 100%   BMI 29.67 kg/m²     Constitutional: Patient is alert and in no distress.  Patient with moderate substernal discomfort.    ENT: There is a normal pharynx with no acute erythema or exudate and oral mucosa is moist.  Nose is clear with no drainage.  Tympanic membranes intact and nonerythemic    Respiratory: Patient is clear to auscultation bilaterally with no wheezing or rhonchi.  Chest wall is nontender.  There are no external lesions on the chest.  There is no crepitance    Cardiovascular: S1-S2 with a diastolic murmur 1 out of 6.  No peripheral edema.    Abdomen: Soft, nontender, and  bowel sounds are normal in all 4 quadrants.  There is no rebound or guarding noted.  There is no abdominal distention or hepatosplenomegaly.    Genitourinary: Patient is voiding appropriately.    Integument: No acute lesions noted and color appears to be normal.    [place] Coma Scale: Total score 15    Neurological: Patient is alert and oriented x4 and no acute findings noted.  Speech is fluent and cognition is normal.  No evidence of acute CVA.  Cranial nerves II through XII intact.  Patient with normal motor function as well as reflexes and sensation    RADIOLOGY/PROCEDURES      XR Chest 1 View   Final Result   1. No acute disease.                       This report was signed and finalized on 8/23/2024 7:04 PM by Dr. Narendra Morgan MD.                 FUTURE APPOINTMENTS     Future Appointments   Date Time Provider Department Center   2/20/2025  1:15 PM Osman Patrick " DO [facility]          EKG (reviewed and interpreted by me): Normal sinus rhythm. No acute ischemia. Heart rate 70 with no acute ST segment elevation or depression noted      HEART SCORE     Patient history  1       Slightly suspicious (0 points)  2   ECG       Nonspecific repolarization disturbance (1 point)    3   Patient age       Between 45 and 65 (1 point)    4   Risk factors (Hypercholesterolemia, Hypertension, diabetes, smoking, obesity)     More than 3 risk factors or atherosclerosis history (2 points)    5   Troponin     Less than normal limit (0 points)     6     TOTAL RISK NUMBER: 4    The three risk categories are described below:  Heart score MACE risk Recommendation  0 - 3 Low (1.7%) Discharge can be an option.  4 - 6 Intermediate (20.3%) Clinical observation and further investigations.  7 - 10 High (72.2%) Immediate invasive treatment        COURSE & MEDICAL DECISION MAKING       Patient's partial differential diagnosis can include:    Unstable angina, angina, arrhythmia, electrolyte abnormality, pneumonia, pneumonitis, pneumothorax, pulmonary embolism, and others    Discussed with the patient the results of her laboratory radiological test.  Explained that her troponin was negative x 2.  Will call Dr. Patrick her cardiologist due to the having an appointment on 8/27/2024 for a repeat left heart cath.    Discussed the case with cardiologist Dr. Patrick who states that the patient is cleared to be discharged home and to see him on Tuesday as scheduled for repeat left heart cath.    Patient has been made aware of this discussion with cardiologist Dr. Patrick and feels comfortable with being discharged home.  Patient is still chest discomfort free        Patient's level of risk: Moderate        CRITICAL CARE    CRITICAL CARE: No    CRITICAL CARE TIME: None      Recent Results (from the past 24 hour(s))   ECG 12 Lead Chest Pain    Collection Time: 08/23/24  5:54 PM   Result Value Ref Range     QT Interval 394 ms    QTC Interval 425 ms   Green Top (Gel)    Collection Time: 08/23/24  5:59 PM   Result Value Ref Range    Extra Tube Hold for add-ons.    Lavender Top    Collection Time: 08/23/24  5:59 PM   Result Value Ref Range    Extra Tube hold for add-on    Red Top    Collection Time: 08/23/24  5:59 PM   Result Value Ref Range    Extra Tube Hold for add-ons.    Gray Top    Collection Time: 08/23/24  5:59 PM   Result Value Ref Range    Extra Tube Hold for add-ons.    Light Blue Top    Collection Time: 08/23/24  5:59 PM   Result Value Ref Range    Extra Tube Hold for add-ons.    Comprehensive Metabolic Panel    Collection Time: 08/23/24  5:59 PM    Specimen: Blood   Result Value Ref Range    Glucose 122 (H) 65 - 99 mg/dL    BUN 10 6 - 20 mg/dL    Creatinine 0.93 0.57 - 1.00 mg/dL    Sodium 143 136 - 145 mmol/L    Potassium 4.0 3.5 - 5.2 mmol/L    Chloride 107 98 - 107 mmol/L    CO2 27.0 22.0 - 29.0 mmol/L    Calcium 9.1 8.6 - 10.5 mg/dL    Total Protein 7.3 6.0 - 8.5 g/dL    Albumin 3.9 3.5 - 5.2 g/dL    ALT (SGPT) 10 1 - 33 U/L    AST (SGOT) 18 1 - 32 U/L    Alkaline Phosphatase 125 (H) 39 - 117 U/L    Total Bilirubin 0.2 0.0 - 1.2 mg/dL    Globulin 3.4 gm/dL    A/G Ratio 1.1 g/dL    BUN/Creatinine Ratio 10.8 7.0 - 25.0    Anion Gap 9.0 5.0 - 15.0 mmol/L    eGFR 71.8 >60.0 mL/min/1.73   Protime-INR    Collection Time: 08/23/24  5:59 PM    Specimen: Blood   Result Value Ref Range    Protime 12.7 11.8 - 14.8 Seconds    INR 0.92 0.91 - 1.09   aPTT    Collection Time: 08/23/24  5:59 PM    Specimen: Blood   Result Value Ref Range    PTT 30.6 24.5 - 36.0 seconds   High Sensitivity Troponin T    Collection Time: 08/23/24  5:59 PM    Specimen: Blood   Result Value Ref Range    HS Troponin T 9 <14 ng/L   BNP    Collection Time: 08/23/24  5:59 PM    Specimen: Blood   Result Value Ref Range    proBNP 221.2 0.0 - 900.0 pg/mL   Lactic Acid, Plasma    Collection Time: 08/23/24  5:59 PM    Specimen: Blood   Result Value  Ref Range    Lactate 1.2 0.5 - 2.0 mmol/L   CK    Collection Time: 08/23/24  5:59 PM    Specimen: Blood   Result Value Ref Range    Creatine Kinase 109 20 - 180 U/L   Magnesium    Collection Time: 08/23/24  5:59 PM    Specimen: Blood   Result Value Ref Range    Magnesium 2.0 1.6 - 2.6 mg/dL   CBC Auto Differential    Collection Time: 08/23/24  5:59 PM    Specimen: Blood   Result Value Ref Range    WBC 8.85 3.40 - 10.80 10*3/mm3    RBC 4.06 3.77 - 5.28 10*6/mm3    Hemoglobin 10.4 (L) 12.0 - 15.9 g/dL    Hematocrit 34.5 34.0 - 46.6 %    MCV 85.0 79.0 - 97.0 fL    MCH 25.6 (L) 26.6 - 33.0 pg    MCHC 30.1 (L) 31.5 - 35.7 g/dL    RDW 16.4 (H) 12.3 - 15.4 %    RDW-SD 51.2 37.0 - 54.0 fl    MPV 11.2 6.0 - 12.0 fL    Platelets 303 140 - 450 10*3/mm3    Neutrophil % 60.1 42.7 - 76.0 %    Lymphocyte % 28.5 19.6 - 45.3 %    Monocyte % 7.3 5.0 - 12.0 %    Eosinophil % 3.6 0.3 - 6.2 %    Basophil % 0.2 0.0 - 1.5 %    Immature Grans % 0.3 0.0 - 0.5 %    Neutrophils, Absolute 5.31 1.70 - 7.00 10*3/mm3    Lymphocytes, Absolute 2.52 0.70 - 3.10 10*3/mm3    Monocytes, Absolute 0.65 0.10 - 0.90 10*3/mm3    Eosinophils, Absolute 0.32 0.00 - 0.40 10*3/mm3    Basophils, Absolute 0.02 0.00 - 0.20 10*3/mm3    Immature Grans, Absolute 0.03 0.00 - 0.05 10*3/mm3    nRBC 0.0 0.0 - 0.2 /100 WBC   High Sensitivity Troponin T 2Hr    Collection Time: 08/23/24  8:01 PM    Specimen: Blood   Result Value Ref Range    HS Troponin T 8 <14 ng/L    Troponin T Delta -1 >=-4 - <+4 ng/L            The patient's last clinical visit to PCP was reviewed by me:       Old charts were reviewed per [facility] EMR.  Pertinent details are summarized above.  All laboratory, radiologic, and EKG studies that were performed in the Emergency Department were a necessary part of the evaluation needed to exclude unstable or  emergent medical conditions.     Patient was hemodynamically and neurologically stable in the ED.   Pertinent studies were reviewed as above.     The patient  received:  Medications   sodium chloride 0.9 % flush 10 mL (has no administration in time range)   nitroglycerin (NITROSTAT) SL tablet 0.4 mg (0.4 mg Sublingual Given 8/23/24 1844)   nitroglycerin (NITROSTAT) ointment 0.5 inch (0.5 inches Topical Given 8/23/24 2001)            ED Disposition       None              Dragon disclaimer:  Part of this note may be an electronic transcription/translation of spoken language to printed text using the Dragon Dictation System.    I have reviewed the patient’s prescription history via a prescription monitoring program.  This information is consistent with my knowledge of the patient’s controlled substance use history.    Patient evaluated during Coronavirus Pandemic. Isolation practices followed according to [facility] policy.       FINAL IMPRESSION   Diagnosis Plan   1. Shortness of breath        2. Atypical chest pain              MD Flynn Phelps Jr, Thomas Mark Jr., MD  08/23/24 1601

## 2024-08-27 ENCOUNTER — HOSPITAL ENCOUNTER (OUTPATIENT)
Facility: HOSPITAL | Age: 59
Discharge: HOME | End: 2024-08-28
Attending: EMERGENCY MEDICINE | Admitting: EMERGENCY MEDICINE
Payer: COMMERCIAL

## 2024-08-27 DIAGNOSIS — I25.110 CORONARY ARTERY DISEASE INVOLVING NATIVE CORONARY ARTERY OF NATIVE HEART WITH UNSTABLE ANGINA PECTORIS: ICD-10-CM

## 2024-08-27 DIAGNOSIS — Z91.199 HISTORY OF NONCOMPLIANCE WITH MEDICAL TREATMENT: ICD-10-CM

## 2024-08-27 PROCEDURE — 25010000002 MIDAZOLAM HCL (PF) 5 MG/5ML SOLUTION: Performed by: EMERGENCY MEDICINE

## 2024-08-27 PROCEDURE — C1760 CLOSURE DEV, VASC: HCPCS | Performed by: EMERGENCY MEDICINE

## 2024-08-27 PROCEDURE — 25010000002 HEPARIN (PORCINE) 2000-0.9 UNIT/L-% SOLUTION: Performed by: EMERGENCY MEDICINE

## 2024-08-27 PROCEDURE — 25010000002 FENTANYL CITRATE (PF) 50 MCG/ML SOLUTION: Performed by: EMERGENCY MEDICINE

## 2024-08-27 PROCEDURE — C1769 GUIDE WIRE: HCPCS | Performed by: EMERGENCY MEDICINE

## 2024-08-27 PROCEDURE — 93458 L HRT ARTERY/VENTRICLE ANGIO: CPT | Performed by: EMERGENCY MEDICINE

## 2024-08-27 PROCEDURE — 25010000002 BIVALIRUDIN TRIFLUOROACETATE 250 MG RECONSTITUTED SOLUTION 1 EACH VIAL: Performed by: EMERGENCY MEDICINE

## 2024-08-27 PROCEDURE — 94799 UNLISTED PULMONARY SVC/PX: CPT

## 2024-08-27 PROCEDURE — C1725 CATH, TRANSLUMIN NON-LASER: HCPCS | Performed by: EMERGENCY MEDICINE

## 2024-08-27 PROCEDURE — 92921: CPT | Performed by: EMERGENCY MEDICINE

## 2024-08-27 PROCEDURE — G0378 HOSPITAL OBSERVATION PER HR: HCPCS

## 2024-08-27 PROCEDURE — C1874 STENT, COATED/COV W/DEL SYS: HCPCS | Performed by: EMERGENCY MEDICINE

## 2024-08-27 PROCEDURE — C1887 CATHETER, GUIDING: HCPCS | Performed by: EMERGENCY MEDICINE

## 2024-08-27 PROCEDURE — 99152 MOD SED SAME PHYS/QHP 5/>YRS: CPT | Performed by: EMERGENCY MEDICINE

## 2024-08-27 PROCEDURE — 25810000003 SODIUM CHLORIDE 0.9 % SOLUTION: Performed by: EMERGENCY MEDICINE

## 2024-08-27 PROCEDURE — C1894 INTRO/SHEATH, NON-LASER: HCPCS | Performed by: EMERGENCY MEDICINE

## 2024-08-27 PROCEDURE — 25510000001 IOPAMIDOL PER 1 ML: Performed by: EMERGENCY MEDICINE

## 2024-08-27 PROCEDURE — 25010000002 DIPHENHYDRAMINE PER 50 MG: Performed by: EMERGENCY MEDICINE

## 2024-08-27 PROCEDURE — 99153 MOD SED SAME PHYS/QHP EA: CPT | Performed by: EMERGENCY MEDICINE

## 2024-08-27 PROCEDURE — 92928 PRQ TCAT PLMT NTRAC ST 1 LES: CPT | Performed by: EMERGENCY MEDICINE

## 2024-08-27 PROCEDURE — S0260 H&P FOR SURGERY: HCPCS | Performed by: EMERGENCY MEDICINE

## 2024-08-27 PROCEDURE — C9600 PERC DRUG-EL COR STENT SING: HCPCS | Performed by: EMERGENCY MEDICINE

## 2024-08-27 PROCEDURE — 25010000002 HEPARIN (PORCINE) 1000-0.9 UT/500ML-% SOLUTION: Performed by: EMERGENCY MEDICINE

## 2024-08-27 DEVICE — STNT CORNRY RESOLUTE ONYX RX 2X12MM: Type: IMPLANTABLE DEVICE | Site: CORONARY | Status: FUNCTIONAL

## 2024-08-27 RX ORDER — DIPHENHYDRAMINE HYDROCHLORIDE 50 MG/ML
INJECTION INTRAMUSCULAR; INTRAVENOUS
Status: DISCONTINUED | OUTPATIENT
Start: 2024-08-27 | End: 2024-08-27 | Stop reason: HOSPADM

## 2024-08-27 RX ORDER — ASPIRIN 81 MG/1
TABLET, CHEWABLE ORAL
Status: DISCONTINUED | OUTPATIENT
Start: 2024-08-27 | End: 2024-08-27 | Stop reason: HOSPADM

## 2024-08-27 RX ORDER — CARVEDILOL 6.25 MG/1
6.25 TABLET ORAL 2 TIMES DAILY WITH MEALS
Status: DISCONTINUED | OUTPATIENT
Start: 2024-08-27 | End: 2024-08-28 | Stop reason: HOSPADM

## 2024-08-27 RX ORDER — RANOLAZINE 500 MG/1
500 TABLET, EXTENDED RELEASE ORAL 2 TIMES DAILY
Status: DISCONTINUED | OUTPATIENT
Start: 2024-08-27 | End: 2024-08-28 | Stop reason: HOSPADM

## 2024-08-27 RX ORDER — SODIUM CHLORIDE 9 MG/ML
100 INJECTION, SOLUTION INTRAVENOUS CONTINUOUS
Status: DISCONTINUED | OUTPATIENT
Start: 2024-08-27 | End: 2024-08-28 | Stop reason: HOSPADM

## 2024-08-27 RX ORDER — IOPAMIDOL 755 MG/ML
INJECTION, SOLUTION INTRAVASCULAR
Status: DISCONTINUED | OUTPATIENT
Start: 2024-08-27 | End: 2024-08-27 | Stop reason: HOSPADM

## 2024-08-27 RX ORDER — MIDAZOLAM HYDROCHLORIDE 5 MG/5ML
INJECTION, SOLUTION INTRAMUSCULAR; INTRAVENOUS
Status: DISCONTINUED | OUTPATIENT
Start: 2024-08-27 | End: 2024-08-27 | Stop reason: HOSPADM

## 2024-08-27 RX ORDER — PANTOPRAZOLE SODIUM 40 MG/1
40 TABLET, DELAYED RELEASE ORAL 2 TIMES DAILY
Status: DISCONTINUED | OUTPATIENT
Start: 2024-08-27 | End: 2024-08-28 | Stop reason: HOSPADM

## 2024-08-27 RX ORDER — FUROSEMIDE 20 MG
20 TABLET ORAL DAILY
COMMUNITY

## 2024-08-27 RX ORDER — ZOLPIDEM TARTRATE 5 MG/1
10 TABLET ORAL NIGHTLY PRN
Status: DISCONTINUED | OUTPATIENT
Start: 2024-08-27 | End: 2024-08-28 | Stop reason: HOSPADM

## 2024-08-27 RX ORDER — NITROGLYCERIN 0.4 MG/1
0.4 TABLET SUBLINGUAL
Status: DISCONTINUED | OUTPATIENT
Start: 2024-08-27 | End: 2024-08-28 | Stop reason: HOSPADM

## 2024-08-27 RX ORDER — FENTANYL CITRATE 50 UG/ML
INJECTION, SOLUTION INTRAMUSCULAR; INTRAVENOUS
Status: DISCONTINUED | OUTPATIENT
Start: 2024-08-27 | End: 2024-08-27 | Stop reason: HOSPADM

## 2024-08-27 RX ORDER — ONDANSETRON 2 MG/ML
4 INJECTION INTRAMUSCULAR; INTRAVENOUS EVERY 6 HOURS PRN
Status: DISCONTINUED | OUTPATIENT
Start: 2024-08-27 | End: 2024-08-28 | Stop reason: HOSPADM

## 2024-08-27 RX ORDER — CLOPIDOGREL BISULFATE 75 MG/1
TABLET ORAL
Status: DISCONTINUED | OUTPATIENT
Start: 2024-08-27 | End: 2024-08-27 | Stop reason: HOSPADM

## 2024-08-27 RX ORDER — SODIUM CHLORIDE 9 MG/ML
1 INJECTION, SOLUTION INTRAVENOUS CONTINUOUS
Status: DISPENSED | OUTPATIENT
Start: 2024-08-27 | End: 2024-08-27

## 2024-08-27 RX ORDER — ACETAMINOPHEN 325 MG/1
650 TABLET ORAL EVERY 4 HOURS PRN
Status: DISCONTINUED | OUTPATIENT
Start: 2024-08-27 | End: 2024-08-28 | Stop reason: HOSPADM

## 2024-08-27 RX ORDER — ISOSORBIDE MONONITRATE 30 MG/1
30 TABLET, EXTENDED RELEASE ORAL DAILY
Status: DISCONTINUED | OUTPATIENT
Start: 2024-08-27 | End: 2024-08-28 | Stop reason: HOSPADM

## 2024-08-27 RX ORDER — LIDOCAINE HYDROCHLORIDE 20 MG/ML
INJECTION, SOLUTION INFILTRATION; PERINEURAL
Status: DISCONTINUED | OUTPATIENT
Start: 2024-08-27 | End: 2024-08-27 | Stop reason: HOSPADM

## 2024-08-27 RX ORDER — ALBUTEROL SULFATE 0.83 MG/ML
2.5 SOLUTION RESPIRATORY (INHALATION) EVERY 6 HOURS PRN
Status: DISCONTINUED | OUTPATIENT
Start: 2024-08-27 | End: 2024-08-28 | Stop reason: HOSPADM

## 2024-08-27 RX ORDER — HEPARIN SODIUM 200 [USP'U]/100ML
INJECTION, SOLUTION INTRAVENOUS
Status: DISCONTINUED | OUTPATIENT
Start: 2024-08-27 | End: 2024-08-27 | Stop reason: HOSPADM

## 2024-08-27 RX ORDER — HYDROCODONE BITARTRATE AND ACETAMINOPHEN 10; 325 MG/1; MG/1
1 TABLET ORAL 4 TIMES DAILY
Status: DISCONTINUED | OUTPATIENT
Start: 2024-08-27 | End: 2024-08-28 | Stop reason: HOSPADM

## 2024-08-27 RX ORDER — ONDANSETRON 4 MG/1
4 TABLET, ORALLY DISINTEGRATING ORAL EVERY 6 HOURS PRN
Status: DISCONTINUED | OUTPATIENT
Start: 2024-08-27 | End: 2024-08-28 | Stop reason: HOSPADM

## 2024-08-27 RX ORDER — CLOPIDOGREL BISULFATE 75 MG/1
75 TABLET ORAL DAILY
Status: DISCONTINUED | OUTPATIENT
Start: 2024-08-27 | End: 2024-08-28 | Stop reason: HOSPADM

## 2024-08-27 RX ORDER — GABAPENTIN 300 MG/1
600 CAPSULE ORAL EVERY 8 HOURS SCHEDULED
Status: DISCONTINUED | OUTPATIENT
Start: 2024-08-27 | End: 2024-08-28 | Stop reason: HOSPADM

## 2024-08-27 RX ORDER — ATORVASTATIN CALCIUM 40 MG/1
80 TABLET, FILM COATED ORAL NIGHTLY
Status: DISCONTINUED | OUTPATIENT
Start: 2024-08-27 | End: 2024-08-28 | Stop reason: HOSPADM

## 2024-08-27 RX ORDER — TOPIRAMATE 25 MG/1
50 TABLET, FILM COATED ORAL DAILY
Status: DISCONTINUED | OUTPATIENT
Start: 2024-08-27 | End: 2024-08-28 | Stop reason: HOSPADM

## 2024-08-27 RX ORDER — ASPIRIN 81 MG/1
81 TABLET ORAL DAILY
Status: DISCONTINUED | OUTPATIENT
Start: 2024-08-27 | End: 2024-08-28 | Stop reason: HOSPADM

## 2024-08-27 RX ADMIN — ATORVASTATIN CALCIUM 80 MG: 40 TABLET ORAL at 21:21

## 2024-08-27 RX ADMIN — CARVEDILOL 6.25 MG: 6.25 TABLET, FILM COATED ORAL at 18:14

## 2024-08-27 RX ADMIN — RANOLAZINE 500 MG: 500 TABLET, FILM COATED, EXTENDED RELEASE ORAL at 21:21

## 2024-08-27 RX ADMIN — ISOSORBIDE MONONITRATE 30 MG: 30 TABLET, EXTENDED RELEASE ORAL at 18:14

## 2024-08-27 RX ADMIN — Medication 12.5 MG: at 18:14

## 2024-08-27 RX ADMIN — TOPIRAMATE 50 MG: 25 TABLET, FILM COATED ORAL at 18:13

## 2024-08-27 RX ADMIN — SACUBITRIL AND VALSARTAN 1 TABLET: 24; 26 TABLET, FILM COATED ORAL at 21:21

## 2024-08-27 RX ADMIN — ZOLPIDEM TARTRATE 10 MG: 5 TABLET, FILM COATED ORAL at 21:21

## 2024-08-27 RX ADMIN — PANTOPRAZOLE SODIUM 40 MG: 40 TABLET, DELAYED RELEASE ORAL at 21:21

## 2024-08-27 RX ADMIN — SODIUM CHLORIDE 100 ML/HR: 9 INJECTION, SOLUTION INTRAVENOUS at 18:17

## 2024-08-27 RX ADMIN — HYDROCODONE BITARTRATE AND ACETAMINOPHEN 1 TABLET: 10; 325 TABLET ORAL at 18:14

## 2024-08-27 RX ADMIN — HYDROCODONE BITARTRATE AND ACETAMINOPHEN 1 TABLET: 10; 325 TABLET ORAL at 21:21

## 2024-08-27 RX ADMIN — GABAPENTIN 600 MG: 300 CAPSULE ORAL at 21:21

## 2024-08-27 NOTE — H&P
Patient seen and examined at bedside.  History and physical have not changed as below.    We will be performing a diagnostic left heart catheterization with possible invention based on fines.    Risk, benefits and alternatives were explained to the patient and she wished to proceed.         Subjective  Patient ID: Alannah Barr is a 58 y.o. female.     Chief Complaint:  History of Present Illness  History of Present Illness  The patient presents for evaluation of coronary artery disease. She is accompanied by an adult female.     She reports fluctuating health, with good and bad days. She has reduced her smoking from 2 to 3 packs a day to half a pack, following a heart attack that caused severe breathlessness. She has considered vaping as an alternative to smoking. She has not used marijuana since she was 20 years old.     She underwent a heart catheterization in 05/2024, during which two stents were placed. An echocardiogram was performed on 08/25/2024 by Dr. Cespedes, who also prescribed hydrocodone. She continues to take Plavix, but has stopped taking aspirin for the past 4 to 5 months. Her daily medications include Lipitor, Coreg, Entresto, Lasix 20, potassium, Ranexa, spironolactone, and clopidogrel. She takes half a tablet of spironolactone every other day.     She experiences anxiety at night, fearing she will stop breathing if she lies down. This fear sometimes escalates to panic attacks. She occasionally experiences chest pain, but has not used nitroglycerin, instead opting for aspirin. The severity of her chest pain varies, with some episodes being so intense that she considers going to the hospital. The most recent severe episode occurred within the last 2 weeks. She has become accustomed to the chest pain.     She has been struggling with fluid retention, despite working 5 days a week. She weighs herself daily and has noticed a decrease from 156 to 152 pounds. She has experienced episodes of low  blood pressure, with one instance dropping to 52/38, causing weakness, particularly in her legs. She has had several falls due to dizziness and lightheadedness, which she attributes to low oxygen levels.     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     FAMILY HISTORY  Her mother is in the fourth stage of COPD. Her father  at the age of 60. Her paternal grandmother  at the age of 59.           Review of Systems   Constitutional: Positive for malaise/fatigue. Negative for diaphoresis and fever.   HENT:  Negative for congestion.    Eyes:  Negative for vision loss in left eye and vision loss in right eye.   Cardiovascular:  Positive for chest pain. Negative for claudication, dyspnea on exertion, irregular heartbeat, leg swelling, orthopnea, palpitations and syncope.   Respiratory:  Positive for shortness of breath. Negative for cough and wheezing.    Hematologic/Lymphatic: Negative for adenopathy.   Skin:  Negative for rash.   Musculoskeletal:  Negative for joint pain and joint swelling.   Gastrointestinal:  Negative for abdominal pain, diarrhea, nausea and vomiting.   Neurological:  Negative for excessive daytime sleepiness, dizziness, focal weakness, light-headedness, numbness and weakness.   Psychiatric/Behavioral:  Negative for depression. The patient is nervous/anxious. The patient does not have insomnia.               Current Medications      Current Outpatient Medications:     albuterol sulfate  (90 Base) MCG/ACT inhaler, Inhale 2 puffs Every 4 (Four) Hours As Needed for Wheezing., Disp: , Rfl:     aspirin 81 MG EC tablet, Take 1 tablet by mouth Daily., Disp: 90 tablet, Rfl: 3    atorvastatin (LIPITOR) 80 MG tablet, Take 1 tablet by mouth Every Night., Disp: 90 tablet, Rfl: 3    carvedilol (COREG) 6.25 MG tablet, Take 1 tablet by mouth 2 (Two) Times a Day With Meals., Disp: 180 tablet, Rfl: 3    FLUoxetine (PROzac) 20 MG capsule, Take 2 capsules by mouth  Daily., Disp: , Rfl:     gabapentin (NEURONTIN) 600 MG tablet, Take 1 tablet by mouth 3 (Three) Times a Day., Disp: , Rfl:     HYDROcodone-acetaminophen (NORCO)  MG per tablet, Take 1 tablet by mouth 4 (Four) Times a Day., Disp: , Rfl:     isosorbide mononitrate (IMDUR) 30 MG 24 hr tablet, Take 1 tablet by mouth Daily., Disp: 90 tablet, Rfl: 3    nitroglycerin (Nitrostat) 0.4 MG SL tablet, Place 1 tablet under the tongue Every 5 (Five) Minutes As Needed for Chest Pain. Take no more than 3 doses in 15 minutes., Disp: 50 tablet, Rfl: 1    pantoprazole (PROTONIX) 40 MG EC tablet, Take 1 tablet by mouth 2 (Two) Times a Day., Disp: , Rfl:     potassium chloride 10 MEQ CR tablet, Take 1 tablet by mouth Daily., Disp: 90 tablet, Rfl: 3    ranolazine (RANEXA) 500 MG 12 hr tablet, Take 1 tablet by mouth 2 (Two) Times a Day., Disp: 180 tablet, Rfl: 3    sacubitril-valsartan (Entresto) 24-26 MG tablet, Take 1 tablet by mouth 2 (Two) Times a Day., Disp: 180 tablet, Rfl: 3    spironolactone (ALDACTONE) 25 MG tablet, Take 0.5 tablets by mouth Every Other Day., Disp: 45 tablet, Rfl: 1    tiZANidine (ZANAFLEX) 4 MG tablet, Take 1 tablet by mouth 2 (Two) Times a Day As Needed for Muscle Spasms., Disp: , Rfl:     topiramate (TOPAMAX) 50 MG tablet, Take 1 tablet by mouth Daily., Disp: , Rfl:     vitamin D (ERGOCALCIFEROL) 1.25 MG (70558 UT) capsule capsule, Take 1 capsule by mouth 1 (One) Time Per Week., Disp: , Rfl:     zolpidem (AMBIEN) 10 MG tablet, Take 1 tablet by mouth At Night As Needed for Sleep., Disp: , Rfl:     clopidogrel (PLAVIX) 75 MG tablet, Take 1 tablet by mouth Daily., Disp: 90 tablet, Rfl: 3    furosemide (LASIX) 40 MG tablet, Take 1 tablet by mouth Daily., Disp: 90 tablet, Rfl: 3              Objective:      Objective      Vitals:     08/15/24 1448   BP: 120/62   Pulse: 72   SpO2: 99%      Vitals and nursing note reviewed.   Constitutional:       Appearance: Normal and healthy appearance. Well-developed and  not in distress.   Eyes:      Extraocular Movements: Extraocular movements intact.      Pupils: Pupils are equal, round, and reactive to light.   HENT:      Head: Normocephalic and atraumatic.    Mouth/Throat:      Pharynx: Oropharynx is clear.   Neck:      Vascular: JVD normal.      Trachea: Trachea normal.   Pulmonary:      Effort: Pulmonary effort is normal.      Breath sounds: Normal breath sounds. No wheezing. No rhonchi. No rales.   Cardiovascular:      PMI at left midclavicular line. Normal rate. Regular rhythm. Normal S1. Normal S2.       Murmurs: There is a grade 2/6 systolic murmur.      No gallop.  No click. No rub.   Pulses:     Dorsalis pedis: 2+ bilaterally.     Posterior tibial: 2+ bilaterally.  Abdominal:      General: Bowel sounds are normal.      Palpations: Abdomen is soft.      Tenderness: There is no abdominal tenderness.   Musculoskeletal: Normal range of motion.      Cervical back: Normal range of motion and neck supple. Skin:     General: Skin is warm and dry.      Capillary Refill: Capillary refill takes less than 2 seconds.   Feet:      Right foot:      Skin integrity: Skin integrity normal.      Left foot:      Skin integrity: Skin integrity normal.   Neurological:      Mental Status: Alert and oriented to person, place and time.      Sensory: Sensation is intact.      Motor: Motor function is intact.      Coordination: Coordination is intact.   Psychiatric:         Speech: Speech normal.         Behavior: Behavior is cooperative.         Physical Exam        Lab Review:         Procedures  Results  Imaging  Echocardiogram showed potential issues with heart pumping efficiency.     Assessment/Plan:      Problem List Items Addressed This Visit         COPD (chronic obstructive pulmonary disease)     Tobacco dependence     Chest pain due to myocardial ischemia     CAD (coronary artery disease) - Primary     Relevant Medications     clopidogrel (PLAVIX) 75 MG tablet     carvedilol (COREG) 6.25  MG tablet     sacubitril-valsartan (Entresto) 24-26 MG tablet     isosorbide mononitrate (IMDUR) 30 MG 24 hr tablet     ranolazine (RANEXA) 500 MG 12 hr tablet     Other Relevant Orders     Case Request Cath Lab: Left Heart Cath (Completed)     Chronic diastolic CHF (congestive heart failure)     Relevant Medications     clopidogrel (PLAVIX) 75 MG tablet     carvedilol (COREG) 6.25 MG tablet     sacubitril-valsartan (Entresto) 24-26 MG tablet     isosorbide mononitrate (IMDUR) 30 MG 24 hr tablet     ranolazine (RANEXA) 500 MG 12 hr tablet     History of noncompliance with medical treatment     Relevant Orders     Case Request Cath Lab: Left Heart Cath (Completed)      Assessment & Plan  1. Coronary artery disease, native with refractory unstable angina.  Symptoms and history suggest a possible issue with one of her stents. A daily regimen of baby aspirin 81 mg was recommended, with emphasis on not missing any doses. The dosage of Lasix was increased to 40 mg to manage fluid retention. She was advised to continue taking half a tablet of spironolactone every other day. A prescription for all her medications was provided, with instructions to take them daily. She was also advised to quit smoking and consider vaping as an alternative. A heart catheterization was scheduled for 08/27/2024 to assess the condition of her stents. If she experiences chest pain similar to previous episodes, she is to contact the office immediately.     2. Anxiety.  She reported experiencing anxiety, particularly at night, which sometimes leads to panic attacks. She was advised to consider non-pharmacological methods such as mindfulness and relaxation techniques. If symptoms persist, further evaluation and potential treatment options will be considered.     3. Medication Management.  Refills for potassium, Ranexa, spironolactone, and Plavix were provided for 90 days with three refills. She was instructed to ensure she takes all her medications  daily as prescribed.

## 2024-08-27 NOTE — Clinical Note
Prepped: groin. Prepped with: ChloraPrep. The site was clipped. The patient was draped in a sterile fashion. Verbalized Understanding

## 2024-08-27 NOTE — Clinical Note
Hemostasis started on the right femoral artery. Angio-Seal was used in achieving hemostasis. Closure device deployed in the vessel. Hemostasis was not achieved successfully. Closure device additional comment: Both angioseals failed.

## 2024-08-27 NOTE — Clinical Note
First balloon inflation max pressure = 16 tere. First balloon inflation duration = 20 seconds. Second inflation of balloon - Max pressure = 16 tere. 2nd Inflation of balloon - Duration = 20 seconds. Third inflation of balloon - Max pressure = 18 tere. 3rd Inflation of balloon - Duration = 20 seconds. Fourth inflation of balloon - Max pressure = 18 tere. 4th Inflation of balloon - Duration = 20 seconds.

## 2024-08-27 NOTE — Clinical Note
First balloon inflation max pressure = 12 tere. First balloon inflation duration = 22 seconds. Second inflation of balloon - Max pressure = 12 tere. 2nd Inflation of balloon - Duration = 20 seconds. Third inflation of balloon - Max pressure = 14 tere. 3rd Inflation of balloon - Duration = 20 seconds. Fourth inflation of balloon - Max pressure = 16 tere. 4th Inflation of balloon - Duration = 20 seconds.

## 2024-08-27 NOTE — Clinical Note
First balloon inflation max pressure = 18 tere. First balloon inflation duration = 20 seconds. Second inflation of balloon - Max pressure = 20 tere. 2nd Inflation of balloon - Duration = 20 seconds.

## 2024-08-27 NOTE — Clinical Note
Balloon advanced in the second obtuse marginal.. Congenital rubella syndrome Nutrition, metabolism, and development symptoms

## 2024-08-27 NOTE — OP NOTE
"  [facility]  Date of procedure: 8/27/2024     Procedures performed:     1.  Access to the right femoral artery via modified Seldinger technique and ultrasound guidance  2.  Left heart catheterization with retrograde crossing Revonto left ventricular pressures were recorded  3.  LV ventriculography  4.  Selective bilateral coronary angiography  5.  Conscious sedation with continuous hemodynamic monitoring for 50 minutes  6.  Patent hemostasis achieved in the right femoral artery access site using a Perclose closure device  7.  Successful NC PTCA to the obtuse marginal branch with an NC trek amish-2 x 12 mm balloon times multiple inflations  8.  Successful PCI to the obtuse marginal branch with a resolute [place] 2 x 12 mm drug-eluting stent  9.  Successful NC PTCA to the obtuse marginal branch with a NC trek neotwo 0.25 x 12 mm balloon times multiple inflations  10.  Successful NC PTCA to the left circumflex with the NC trek neotwo 0.25 x 12 mm balloon        Risk, Benefits, and Alternatives discussed with the patient and/or family.  Plan is for moderate sedation and the patient agrees to proceed with the procedure.  An immediate assessment was done prior to the administration of moderate sedation     Indication: Unstable angina with symptoms similar to previously, history of coronary artery disease status post multiple stents  Premedication: Versed, fentanyl  Contrast: Isovue 130 cc  Radiation: Flouro time= 5.7 minutes. Air Kerma= 752 mGy  Catheters: 6Fr JL4, 6Fr JR4, 6Fr angled pigtail  Guiding catheter: XB 3.5  PCI Hardware: .014\" BMW wire, multiple balloons and stent as outlined above    Procedural details:    The patient was brought to the cath lab and prepped and draped in the usual fashion.  Access was obtained in the right femoral artery via modified Seldinger technique and ultrasound guidance.  A 6 [place] sheath was placed into the artery and flushed.  Next, a JL 4 was inserted and used " to engage the left and selective left coronary angiography performed in multiple views.  Next a JR4 was inserted and used to engage the right and selective right coronary angiography was performed in multiple views.  Next a pigtail catheter was inserted and used to cross aortic valve to the left ventricle pressures were recorded and LV ventriculography was performed.  This catheter was then pulled back across aortic valve and again pressures were recorded.  XB 3.5 was inserted and used to engage the left main.  A BMW wire was inserted and advanced into the third obtuse marginal branch.  We then inserted a NC trek jean claude-2 x 12 mm balloon into the left circumflex where it was inflated multiple times with an the old stents.  Next, a resolute [place] 2 x 12 mm drug-eluting stent was inserted into the third obtuse marginal branch overlapping the old stent distally where it was deployed at 16 tere for 40 seconds.  Next, an NC trek Jean Claude 2.25 x 12 mm balloon was inserted into the obtuse marginal branch where it was inflated multiple times.  Postintervention angiography performed in multiple views.  Next, the wire was retracted back and advanced into the left circumflex and the NC trek neotwo 0.25 x 12 mm balloon was inserted into the left circumflex where it was inflated.  Postintervention angiography performed in multiple views.  Everything was then withdrawn through the sheath and the sheath was flushed.  Patent hemostasis was achieved in the right femoral artery access site using a Perclose closure device.  Patient tolerated the procedure well without any complications.  She left the Cath Lab in stable condition.      I supervised the administration of conscious sedation by nursing staff throughout the case.  First dose was given at 1301 and the end of my face-to-face encounter was at 1354, accounting for a total of 50 minutes of supervision.  During the case, continuous pulse oximetry, heart rate, blood pressure, and patient  status were monitored.     Findings:    Hemodynamics:    Aortic: 102/51 mmHg  LV: 96/1 mmHg  LVEDP: 13 mmHg    Left ventriculography:  1. The contractility of the left ventricle is normal.  Estimated ejection fraction 55%.  2.  No significant gradient across aortic valve on pullback      Selective coronary angiography:     Left main: Large-caliber vessel that bifurcates into the LAD and left circumflex, no angiographic evidence of stenosis, ALEXY-3 flow    LAD: LAD is a large-caliber vessel with a patent stent in the midsegment, ALEXY-3 flow    Diagonals: 1 moderate caliber diagonal vessel with mild disease, remaining diagonals are small caliber    Left circumflex: Large caliber vessel with stent present in the mid and distal segment with 70% in-stent stenosis after the third obtuse marginal branch.  Status post successful NC PTCA via continuation ALEXY-3 flow and no residual stenosis remaining    Obtuse marginals: The first obtuse marginal small caliber, second obtuse marginal is moderate caliber with no significant disease, the third obtuse marginal is moderate caliber with multiple stents present in the proximal and mid segment, there is 50% in-stent stenosis in the proximal segment, there is 80 to 90% in-stent stenosis in the midsegment, there is 70 to 80% stenosis distal to the last stent.  Status post successful NC PTCA and stent placement distal to the old stent, we had continuation ALEXY-3 flow and no residual stenosis remaining    RONNY: Small caliber    RCA: Moderate caliber vessel with no significant disease, ALEXY-3 flow    PDA is small caliber      Estimated Blood Loss: 20 cc    Specimens: None    Complications: None       Impression:  1.  Coronary artery disease as described above including significant in-stent stenosis of the third obtuse marginal and distal left circumflex status post successful NC PTCA and 1 stent placed to the third obtuse marginal  2.  COPD  3.  Ongoing tobacco abuse  4.  Chronic  diastolic CHF     Plan:   1.  We will admit to the hospital monitor closely overnight  2.  Discharge home in the morning  3.  Dual antiplatelet therapy with aspirin and Plavix  4.  Continue on Lipitor 80  5.  Continue on Coreg, Imdur and Entresto and Ranexa at home doses  6.  Counseled the patient on needing to stop smoking or the stents are going to continue to develop further disease    Osman Patrick, DO  Interventional cardiology  [facility]  August 27, 2024

## 2024-08-27 NOTE — Clinical Note
First balloon inflation max pressure = 18 tere. First balloon inflation duration = 20 seconds. Second inflation of balloon - Max pressure = 18 tere. 2nd Inflation of balloon - Duration = 20 seconds. Third inflation of balloon - Max pressure = 18 tere. 3rd Inflation of balloon - Duration = 20 seconds.

## 2024-08-28 VITALS
BODY MASS INDEX: 31.38 KG/M2 | HEIGHT: 60 IN | SYSTOLIC BLOOD PRESSURE: 110 MMHG | WEIGHT: 159.83 LBS | HEART RATE: 71 BPM | RESPIRATION RATE: 16 BRPM | DIASTOLIC BLOOD PRESSURE: 63 MMHG | OXYGEN SATURATION: 97 % | TEMPERATURE: 98.2 F

## 2024-08-28 DIAGNOSIS — Z95.5 S/P DRUG ELUTING CORONARY STENT PLACEMENT: Primary | ICD-10-CM

## 2024-08-28 PROBLEM — I25.110 ATHEROSCLEROTIC HEART DISEASE OF NATIVE CORONARY ARTERY WITH UNSTABLE ANGINA PECTORIS: Status: ACTIVE | Noted: 2024-08-28

## 2024-08-28 LAB
ANION GAP SERPL CALCULATED.3IONS-SCNC: 9 MMOL/L (ref 5–15)
BUN SERPL-MCNC: 15 MG/DL (ref 6–20)
BUN/CREAT SERPL: 11.7 (ref 7–25)
CALCIUM SPEC-SCNC: 8.7 MG/DL (ref 8.6–10.5)
CHLORIDE SERPL-SCNC: 105 MMOL/L (ref 98–107)
CO2 SERPL-SCNC: 26 MMOL/L (ref 22–29)
CREAT SERPL-MCNC: 1.28 MG/DL (ref 0.57–1)
DEPRECATED RDW RBC AUTO: 52.1 FL (ref 37–54)
EGFRCR SERPLBLD CKD-EPI 2021: 49 ML/MIN/1.73
ERYTHROCYTE [DISTWIDTH] IN BLOOD BY AUTOMATED COUNT: 16.4 % (ref 12.3–15.4)
GLUCOSE SERPL-MCNC: 134 MG/DL (ref 65–99)
HCT VFR BLD AUTO: 30.6 % (ref 34–46.6)
HGB BLD-MCNC: 9 G/DL (ref 12–15.9)
MCH RBC QN AUTO: 25.5 PG (ref 26.6–33)
MCHC RBC AUTO-ENTMCNC: 29.4 G/DL (ref 31.5–35.7)
MCV RBC AUTO: 86.7 FL (ref 79–97)
PLATELET # BLD AUTO: 259 10*3/MM3 (ref 140–450)
PMV BLD AUTO: 10.9 FL (ref 6–12)
POTASSIUM SERPL-SCNC: 3.9 MMOL/L (ref 3.5–5.2)
RBC # BLD AUTO: 3.53 10*6/MM3 (ref 3.77–5.28)
SODIUM SERPL-SCNC: 140 MMOL/L (ref 136–145)
WBC NRBC COR # BLD AUTO: 10.57 10*3/MM3 (ref 3.4–10.8)

## 2024-08-28 PROCEDURE — 99238 HOSP IP/OBS DSCHRG MGMT 30/<: CPT | Performed by: EMERGENCY MEDICINE

## 2024-08-28 PROCEDURE — 93005 ELECTROCARDIOGRAM TRACING: CPT | Performed by: EMERGENCY MEDICINE

## 2024-08-28 PROCEDURE — 85027 COMPLETE CBC AUTOMATED: CPT | Performed by: EMERGENCY MEDICINE

## 2024-08-28 PROCEDURE — 80048 BASIC METABOLIC PNL TOTAL CA: CPT | Performed by: EMERGENCY MEDICINE

## 2024-08-28 RX ORDER — CLOPIDOGREL BISULFATE 75 MG/1
75 TABLET ORAL DAILY
Qty: 30 TABLET | Refills: 0 | Status: SHIPPED | OUTPATIENT
Start: 2024-08-28

## 2024-08-28 RX ORDER — ASPIRIN 81 MG/1
81 TABLET ORAL DAILY
Qty: 30 TABLET | Refills: 0 | Status: SHIPPED | OUTPATIENT
Start: 2024-08-28

## 2024-08-28 RX ORDER — CARVEDILOL 6.25 MG/1
6.25 TABLET ORAL 2 TIMES DAILY WITH MEALS
Qty: 60 TABLET | Refills: 0 | Status: SHIPPED | OUTPATIENT
Start: 2024-08-28

## 2024-08-28 RX ORDER — ISOSORBIDE MONONITRATE 30 MG/1
30 TABLET, EXTENDED RELEASE ORAL DAILY
Qty: 30 TABLET | Refills: 0 | Status: SHIPPED | OUTPATIENT
Start: 2024-08-28

## 2024-08-28 RX ORDER — ATORVASTATIN CALCIUM 80 MG/1
80 TABLET, FILM COATED ORAL NIGHTLY
Qty: 30 TABLET | Refills: 0 | Status: SHIPPED | OUTPATIENT
Start: 2024-08-28

## 2024-08-28 RX ORDER — RANOLAZINE 500 MG/1
500 TABLET, EXTENDED RELEASE ORAL 2 TIMES DAILY
Qty: 60 TABLET | Refills: 0 | Status: SHIPPED | OUTPATIENT
Start: 2024-08-28

## 2024-08-28 RX ORDER — ALPRAZOLAM 0.5 MG
1 TABLET ORAL ONCE
Status: DISCONTINUED | OUTPATIENT
Start: 2024-08-28 | End: 2024-08-28 | Stop reason: HOSPADM

## 2024-08-28 RX ADMIN — CLOPIDOGREL BISULFATE 75 MG: 75 TABLET, FILM COATED ORAL at 08:25

## 2024-08-28 RX ADMIN — HYDROCODONE BITARTRATE AND ACETAMINOPHEN 1 TABLET: 10; 325 TABLET ORAL at 08:26

## 2024-08-28 RX ADMIN — PANTOPRAZOLE SODIUM 40 MG: 40 TABLET, DELAYED RELEASE ORAL at 08:26

## 2024-08-28 RX ADMIN — CARVEDILOL 6.25 MG: 6.25 TABLET, FILM COATED ORAL at 08:26

## 2024-08-28 RX ADMIN — TOPIRAMATE 50 MG: 25 TABLET, FILM COATED ORAL at 08:26

## 2024-08-28 RX ADMIN — RANOLAZINE 500 MG: 500 TABLET, FILM COATED, EXTENDED RELEASE ORAL at 08:25

## 2024-08-28 RX ADMIN — GABAPENTIN 600 MG: 300 CAPSULE ORAL at 05:15

## 2024-08-28 RX ADMIN — ISOSORBIDE MONONITRATE 30 MG: 30 TABLET, EXTENDED RELEASE ORAL at 08:25

## 2024-08-28 RX ADMIN — SACUBITRIL AND VALSARTAN 1 TABLET: 24; 26 TABLET, FILM COATED ORAL at 08:25

## 2024-08-28 RX ADMIN — ASPIRIN 81 MG: 81 TABLET, COATED ORAL at 08:25

## 2024-08-28 NOTE — DISCHARGE SUMMARY
[facility] DISCHARGE    Date of Discharge:  8/28/2024    Discharge Diagnosis: Coronary artery disease    Presenting Problem/History of Present Illness  Coronary artery disease involving native coronary artery of native heart with unstable angina pectoris [I25.110]  History of noncompliance with medical treatment [Z91.199]  CAD (coronary artery disease) [I25.10]  Atherosclerotic heart disease of native coronary artery with unstable angina pectoris [I25.110]        Hospital Course  Patient is a 59 y.o. female who presented as an outpatient to undergo diagnostic left heart catheterization..  She was found to have significant in-stent stenosis in her obtuse marginal branch.  Please see dedicated procedure note for details.  She received 1 stent to this vessel.  She was monitored overnight without any complications.  She was discharged home in stable condition.  Medications include dual antiplatelet therapy with aspirin and Plavix as well as high intensity statin.  She will follow-up with me in the office in 1 month    Procedures Performed  Procedure(s):  Left Heart Cath       Condition on Discharge: Stable    Physical Exam at Discharge    Vital Signs  Temp:  [96.5 °F (35.8 °C)-98.2 °F (36.8 °C)] 98.2 °F (36.8 °C)  Heart Rate:  [67-74] 71  Resp:  [16-20] 16  BP: ()/(51-78) 110/63    Physical Exam:  Vitals and nursing note reviewed.   Constitutional:       Appearance: Normal and healthy appearance. Well-developed and not in distress.   Eyes:      Extraocular Movements: Extraocular movements intact.      Pupils: Pupils are equal, round, and reactive to light.   HENT:      Head: Normocephalic and atraumatic.    Mouth/Throat:      Pharynx: Oropharynx is clear.   Neck:      Vascular: JVD normal.      Trachea: Trachea normal.   Pulmonary:      Effort: Pulmonary effort is normal.      Breath sounds: Normal breath sounds. No wheezing. No rhonchi. No rales.   Cardiovascular:      PMI at left  midclavicular line. Normal rate. Regular rhythm. Normal S1. Normal S2.       Murmurs: There is a grade 2/6 systolic murmur.      No gallop.  No click. No rub.   Pulses:     Dorsalis pedis: 2+ bilaterally.     Posterior tibial: 2+ bilaterally.  Abdominal:      General: Bowel sounds are normal.      Palpations: Abdomen is soft.      Tenderness: There is no abdominal tenderness.   Musculoskeletal: Normal range of motion.      Cervical back: Normal range of motion and neck supple. Skin:     General: Skin is warm and dry.      Capillary Refill: Capillary refill takes less than 2 seconds.   Feet:      Right foot:      Skin integrity: Skin integrity normal.      Left foot:      Skin integrity: Skin integrity normal.   Neurological:      Mental Status: Alert and oriented to person, place and time.      Sensory: Sensation is intact.      Motor: Motor function is intact.      Coordination: Coordination is intact.   Psychiatric:         Speech: Speech normal.         Behavior: Behavior is cooperative.         Discharge Disposition  Home or Self Care    Discharge Medications     Discharge Medications        Continue These Medications        Instructions Start Date   albuterol sulfate  (90 Base) MCG/ACT inhaler  Commonly known as: PROVENTIL HFA;VENTOLIN HFA;PROAIR HFA   2 puffs, Inhalation, Every 4 Hours PRN      aspirin 81 MG EC tablet   81 mg, Oral, Daily      atorvastatin 80 MG tablet  Commonly known as: LIPITOR   80 mg, Oral, Nightly      carvedilol 6.25 MG tablet  Commonly known as: COREG   6.25 mg, Oral, 2 Times Daily With Meals      clopidogrel 75 MG tablet  Commonly known as: PLAVIX   75 mg, Oral, Daily      Entresto 24-26 MG tablet  Generic drug: sacubitril-valsartan   1 tablet, Oral, 2 Times Daily      FLUoxetine 20 MG capsule  Commonly known as: PROzac   40 mg, Oral, Daily      furosemide 20 MG tablet  Commonly known as: LASIX   20 mg, Oral, Daily      gabapentin 600 MG tablet  Commonly known as: NEURONTIN    600 mg, Oral, 3 Times Daily      HYDROcodone-acetaminophen  MG per tablet  Commonly known as: NORCO   1 tablet, Oral, 4 Times Daily      isosorbide mononitrate 30 MG 24 hr tablet  Commonly known as: IMDUR   30 mg, Oral, Daily      nitroglycerin 0.4 MG SL tablet  Commonly known as: Nitrostat   0.4 mg, Sublingual, Every 5 Minutes PRN, Take no more than 3 doses in 15 minutes.      pantoprazole 40 MG EC tablet  Commonly known as: PROTONIX   40 mg, Oral, 2 Times Daily      potassium chloride 10 MEQ CR tablet   10 mEq, Oral, Daily      ranolazine 500 MG 12 hr tablet  Commonly known as: RANEXA   500 mg, Oral, 2 Times Daily      spironolactone 25 MG tablet  Commonly known as: ALDACTONE   12.5 mg, Oral, Every Other Day      tiZANidine 4 MG tablet  Commonly known as: ZANAFLEX   4 mg, Oral, 2 Times Daily PRN      topiramate 50 MG tablet  Commonly known as: TOPAMAX   50 mg, Oral, Daily      zolpidem 10 MG tablet  Commonly known as: AMBIEN   10 mg, Oral, Nightly PRN               Discharge Diet: Heart healthy    Activity at Discharge: As tolerated    Follow-up Appointments  Future Appointments   Date Time Provider Department Center   2/20/2025  1:15 PM Osman Patrick DO [facility]         Test Results Pending at Discharge       Osman Patrick DO  08/28/24  10:15 CDT

## 2024-08-28 NOTE — PLAN OF CARE
Problem: Adult Inpatient Plan of Care  Goal: Plan of Care Review  Outcome: Ongoing, Progressing  Goal: Patient-Specific Goal (Individualized)  Outcome: Ongoing, Progressing  Goal: Absence of Hospital-Acquired Illness or Injury  Outcome: Ongoing, Progressing  Intervention: Identify and Manage Fall Risk  Recent Flowsheet Documentation  Taken 8/28/2024 0000 by Zeb Horton RN  Safety Promotion/Fall Prevention: safety round/check completed  Taken 8/27/2024 2100 by Zeb Horton RN  Safety Promotion/Fall Prevention: safety round/check completed  Taken 8/27/2024 2005 by Zeb Horton RN  Safety Promotion/Fall Prevention:   lighting adjusted   safety round/check completed  Intervention: Prevent Skin Injury  Recent Flowsheet Documentation  Taken 8/28/2024 0000 by Zeb Horton RN  Body Position:   position changed independently   supine   30 degrees  Taken 8/27/2024 2200 by Zeb Horton RN  Body Position:   position changed independently   turned   right  Taken 8/27/2024 2005 by Zeb Horton RN  Body Position:   position changed independently   turned   left  Taken 8/27/2024 1900 by Zeb Horton RN  Body Position:   position changed independently   turned   left  Intervention: Prevent and Manage VTE (Venous Thromboembolism) Risk  Recent Flowsheet Documentation  Taken 8/27/2024 1900 by Zeb Horton RN  Activity Management: up in chair  Goal: Optimal Comfort and Wellbeing  Outcome: Ongoing, Progressing  Intervention: Provide Person-Centered Care  Recent Flowsheet Documentation  Taken 8/27/2024 2005 by Zeb Horton RN  Trust Relationship/Rapport: care explained  Goal: Readiness for Transition of Care  Outcome: Ongoing, Progressing  Intervention: Mutually Develop Transition Plan  Recent Flowsheet Documentation  Taken 8/27/2024 2031 by Zeb Horton RN  Transportation Anticipated: family or friend will provide  Patient/Family Anticipated Services at Transition: none  Patient/Family Anticipates Transition to: home      Problem: COPD (Chronic Obstructive Pulmonary Disease) Comorbidity  Goal: Maintenance of COPD Symptom Control  Outcome: Ongoing, Progressing   Goal Outcome Evaluation:   Pain remained CP free overnight. A&Ox4. VSS. Right groin cath site is soft, clean, intact, with no drainage. UOP adequate.

## 2024-08-29 LAB
QT INTERVAL: 392 MS
QT INTERVAL: 394 MS
QTC INTERVAL: 420 MS
QTC INTERVAL: 425 MS

## 2024-09-05 ENCOUNTER — TELEPHONE (OUTPATIENT)
Dept: CARDIOLOGY | Facility: CLINIC | Age: 59
End: 2024-09-05
Payer: COMMERCIAL

## 2024-09-05 NOTE — TELEPHONE ENCOUNTER
Relay     Patient needs to send a picture of her site. If she is unable she will need to call the office back, transfer to [phone].If she has fever, redness at site, swelling she need to go to the ER.

## 2024-10-08 ENCOUNTER — TELEPHONE (OUTPATIENT)
Dept: CARDIOLOGY | Facility: CLINIC | Age: 59
End: 2024-10-08

## 2024-10-08 NOTE — TELEPHONE ENCOUNTER
Caller: Alannah Barr    Relationship to patient: Self    Best call back number: [phone]    Chief complaint: N/A    Type of visit: FU FROM LEFT HEART CATH FROM 08.27.2024    Requested date: ANY     If rescheduling, when is the original appointment: N/A

## 2024-11-14 ENCOUNTER — TELEPHONE (OUTPATIENT)
Dept: CARDIOLOGY | Facility: CLINIC | Age: 59
End: 2024-11-14

## 2024-11-14 ENCOUNTER — APPOINTMENT (OUTPATIENT)
Dept: ULTRASOUND IMAGING | Facility: HOSPITAL | Age: 59
End: 2024-11-14
Payer: COMMERCIAL

## 2024-11-14 ENCOUNTER — APPOINTMENT (OUTPATIENT)
Dept: CT IMAGING | Facility: HOSPITAL | Age: 59
End: 2024-11-14
Payer: COMMERCIAL

## 2024-11-14 ENCOUNTER — APPOINTMENT (OUTPATIENT)
Dept: GENERAL RADIOLOGY | Facility: HOSPITAL | Age: 59
End: 2024-11-14
Payer: COMMERCIAL

## 2024-11-14 ENCOUNTER — HOSPITAL ENCOUNTER (EMERGENCY)
Facility: HOSPITAL | Age: 59
Discharge: HOME | End: 2024-11-14
Payer: COMMERCIAL

## 2024-11-14 VITALS
WEIGHT: 162.5 LBS | RESPIRATION RATE: 23 BRPM | BODY MASS INDEX: 31.9 KG/M2 | SYSTOLIC BLOOD PRESSURE: 141 MMHG | TEMPERATURE: 98.3 F | DIASTOLIC BLOOD PRESSURE: 88 MMHG | HEART RATE: 79 BPM | OXYGEN SATURATION: 97 % | HEIGHT: 60 IN

## 2024-11-14 DIAGNOSIS — J21.9 BRONCHIOLITIS: ICD-10-CM

## 2024-11-14 DIAGNOSIS — R07.9 CHEST PAIN, UNSPECIFIED TYPE: Primary | ICD-10-CM

## 2024-11-14 DIAGNOSIS — R22.31 MASS OF RIGHT AXILLA: ICD-10-CM

## 2024-11-14 LAB
ALBUMIN SERPL-MCNC: 4 G/DL (ref 3.5–5.2)
ALBUMIN/GLOB SERPL: 1.1 G/DL
ALP SERPL-CCNC: 119 U/L (ref 39–117)
ALT SERPL W P-5'-P-CCNC: 12 U/L (ref 1–33)
ANION GAP SERPL CALCULATED.3IONS-SCNC: 11 MMOL/L (ref 5–15)
AST SERPL-CCNC: 28 U/L (ref 1–32)
B PARAPERT DNA SPEC QL NAA+PROBE: NOT DETECTED
B PERT DNA SPEC QL NAA+PROBE: NOT DETECTED
BASOPHILS # BLD AUTO: 0.05 10*3/MM3 (ref 0–0.2)
BASOPHILS NFR BLD AUTO: 0.6 % (ref 0–1.5)
BILIRUB SERPL-MCNC: 0.2 MG/DL (ref 0–1.2)
BUN SERPL-MCNC: 9 MG/DL (ref 6–20)
BUN/CREAT SERPL: 10.6 (ref 7–25)
C PNEUM DNA NPH QL NAA+NON-PROBE: NOT DETECTED
CALCIUM SPEC-SCNC: 8.9 MG/DL (ref 8.6–10.5)
CHLORIDE SERPL-SCNC: 99 MMOL/L (ref 98–107)
CO2 SERPL-SCNC: 27 MMOL/L (ref 22–29)
CREAT SERPL-MCNC: 0.85 MG/DL (ref 0.57–1)
DEPRECATED RDW RBC AUTO: 47.9 FL (ref 37–54)
EGFRCR SERPLBLD CKD-EPI 2021: 80 ML/MIN/1.73
EOSINOPHIL # BLD AUTO: 0.84 10*3/MM3 (ref 0–0.4)
EOSINOPHIL NFR BLD AUTO: 9.8 % (ref 0.3–6.2)
ERYTHROCYTE [DISTWIDTH] IN BLOOD BY AUTOMATED COUNT: 16.7 % (ref 12.3–15.4)
FLUAV SUBTYP SPEC NAA+PROBE: NOT DETECTED
FLUBV RNA ISLT QL NAA+PROBE: NOT DETECTED
GEN 5 2HR TROPONIN T REFLEX: 10 NG/L
GLOBULIN UR ELPH-MCNC: 3.8 GM/DL
GLUCOSE SERPL-MCNC: 149 MG/DL (ref 65–99)
HADV DNA SPEC NAA+PROBE: NOT DETECTED
HCOV 229E RNA SPEC QL NAA+PROBE: NOT DETECTED
HCOV HKU1 RNA SPEC QL NAA+PROBE: NOT DETECTED
HCOV NL63 RNA SPEC QL NAA+PROBE: NOT DETECTED
HCOV OC43 RNA SPEC QL NAA+PROBE: NOT DETECTED
HCT VFR BLD AUTO: 33.1 % (ref 34–46.6)
HGB BLD-MCNC: 10.1 G/DL (ref 12–15.9)
HMPV RNA NPH QL NAA+NON-PROBE: NOT DETECTED
HPIV1 RNA ISLT QL NAA+PROBE: NOT DETECTED
HPIV2 RNA SPEC QL NAA+PROBE: NOT DETECTED
HPIV3 RNA NPH QL NAA+PROBE: NOT DETECTED
HPIV4 P GENE NPH QL NAA+PROBE: NOT DETECTED
IMM GRANULOCYTES # BLD AUTO: 0.02 10*3/MM3 (ref 0–0.05)
IMM GRANULOCYTES NFR BLD AUTO: 0.2 % (ref 0–0.5)
INR PPP: 0.89 (ref 0.91–1.09)
LYMPHOCYTES # BLD AUTO: 3.59 10*3/MM3 (ref 0.7–3.1)
LYMPHOCYTES NFR BLD AUTO: 41.8 % (ref 19.6–45.3)
M PNEUMO IGG SER IA-ACNC: NOT DETECTED
MAGNESIUM SERPL-MCNC: 1.8 MG/DL (ref 1.6–2.6)
MCH RBC QN AUTO: 24.2 PG (ref 26.6–33)
MCHC RBC AUTO-ENTMCNC: 30.5 G/DL (ref 31.5–35.7)
MCV RBC AUTO: 79.4 FL (ref 79–97)
MONOCYTES # BLD AUTO: 0.84 10*3/MM3 (ref 0.1–0.9)
MONOCYTES NFR BLD AUTO: 9.8 % (ref 5–12)
NEUTROPHILS NFR BLD AUTO: 3.24 10*3/MM3 (ref 1.7–7)
NEUTROPHILS NFR BLD AUTO: 37.8 % (ref 42.7–76)
NRBC BLD AUTO-RTO: 0 /100 WBC (ref 0–0.2)
NT-PROBNP SERPL-MCNC: 406.1 PG/ML (ref 0–900)
PLATELET # BLD AUTO: 345 10*3/MM3 (ref 140–450)
PMV BLD AUTO: 10.5 FL (ref 6–12)
POTASSIUM SERPL-SCNC: 4.5 MMOL/L (ref 3.5–5.2)
PROCALCITONIN SERPL-MCNC: 0.03 NG/ML (ref 0–0.25)
PROT SERPL-MCNC: 7.8 G/DL (ref 6–8.5)
PROTHROMBIN TIME: 12.4 SECONDS (ref 11.8–14.8)
RBC # BLD AUTO: 4.17 10*6/MM3 (ref 3.77–5.28)
RHINOVIRUS RNA SPEC NAA+PROBE: NOT DETECTED
RSV RNA NPH QL NAA+NON-PROBE: NOT DETECTED
SARS-COV-2 RNA NPH QL NAA+NON-PROBE: NOT DETECTED
SODIUM SERPL-SCNC: 137 MMOL/L (ref 136–145)
TROPONIN T DELTA: 2 NG/L
TROPONIN T SERPL HS-MCNC: 8 NG/L
WBC NRBC COR # BLD AUTO: 8.58 10*3/MM3 (ref 3.4–10.8)

## 2024-11-14 PROCEDURE — 85610 PROTHROMBIN TIME: CPT | Performed by: PHYSICIAN ASSISTANT

## 2024-11-14 PROCEDURE — 84484 ASSAY OF TROPONIN QUANT: CPT | Performed by: PHYSICIAN ASSISTANT

## 2024-11-14 PROCEDURE — 83735 ASSAY OF MAGNESIUM: CPT | Performed by: PHYSICIAN ASSISTANT

## 2024-11-14 PROCEDURE — 84145 PROCALCITONIN (PCT): CPT | Performed by: PHYSICIAN ASSISTANT

## 2024-11-14 PROCEDURE — 71045 X-RAY EXAM CHEST 1 VIEW: CPT

## 2024-11-14 PROCEDURE — 83880 ASSAY OF NATRIURETIC PEPTIDE: CPT | Performed by: PHYSICIAN ASSISTANT

## 2024-11-14 PROCEDURE — 85025 COMPLETE CBC W/AUTO DIFF WBC: CPT | Performed by: PHYSICIAN ASSISTANT

## 2024-11-14 PROCEDURE — 93971 EXTREMITY STUDY: CPT

## 2024-11-14 PROCEDURE — 71275 CT ANGIOGRAPHY CHEST: CPT

## 2024-11-14 PROCEDURE — 99285 EMERGENCY DEPT VISIT HI MDM: CPT

## 2024-11-14 PROCEDURE — 25510000001 IOPAMIDOL PER 1 ML: Performed by: PHYSICIAN ASSISTANT

## 2024-11-14 PROCEDURE — 80053 COMPREHEN METABOLIC PANEL: CPT | Performed by: PHYSICIAN ASSISTANT

## 2024-11-14 PROCEDURE — 93005 ELECTROCARDIOGRAM TRACING: CPT

## 2024-11-14 PROCEDURE — 93010 ELECTROCARDIOGRAM REPORT: CPT | Performed by: INTERNAL MEDICINE

## 2024-11-14 PROCEDURE — 93971 EXTREMITY STUDY: CPT | Performed by: SURGERY

## 2024-11-14 PROCEDURE — 0202U NFCT DS 22 TRGT SARS-COV-2: CPT | Performed by: PHYSICIAN ASSISTANT

## 2024-11-14 PROCEDURE — 36415 COLL VENOUS BLD VENIPUNCTURE: CPT

## 2024-11-14 RX ORDER — ALBUTEROL SULFATE 0.83 MG/ML
2.5 SOLUTION RESPIRATORY (INHALATION) EVERY 4 HOURS PRN
Qty: 25 EACH | Refills: 0 | Status: SHIPPED | OUTPATIENT
Start: 2024-11-14

## 2024-11-14 RX ORDER — METHYLPREDNISOLONE 4 MG/1
TABLET ORAL
Qty: 21 EACH | Refills: 0 | Status: SHIPPED | OUTPATIENT
Start: 2024-11-14

## 2024-11-14 RX ORDER — HYDROXYZINE PAMOATE 25 MG/1
25 CAPSULE ORAL 3 TIMES DAILY PRN
COMMUNITY
Start: 2024-09-18

## 2024-11-14 RX ORDER — BUMETANIDE 2 MG/1
2 TABLET ORAL DAILY
COMMUNITY
Start: 2024-11-13 | End: 2024-11-23

## 2024-11-14 RX ORDER — IOPAMIDOL 755 MG/ML
100 INJECTION, SOLUTION INTRAVASCULAR
Status: COMPLETED | OUTPATIENT
Start: 2024-11-14 | End: 2024-11-14

## 2024-11-14 RX ORDER — SODIUM CHLORIDE 0.9 % (FLUSH) 0.9 %
10 SYRINGE (ML) INJECTION AS NEEDED
Status: DISCONTINUED | OUTPATIENT
Start: 2024-11-14 | End: 2024-11-14 | Stop reason: HOSPADM

## 2024-11-14 RX ADMIN — IOPAMIDOL 66 ML: 755 INJECTION, SOLUTION INTRAVENOUS at 16:56

## 2024-11-14 NOTE — TELEPHONE ENCOUNTER
Caller: Alannah Barr     Relationship: SELF    Best call back number: [phone]     What is your medical concern? SHORTNESS OF BREATH WHILE LAYING DOWN, GAINED OVER 5LBS IN 3 DAYS    PATIENT'S PCP CHANGED HER FROM LASIX TO BUMEX    How long has this issue been going on? 3 DAYS

## 2024-11-14 NOTE — TELEPHONE ENCOUNTER
Call returned to patient. She states she has extreme shortness of breath for over a week. She reports weight increase up to 165 lb. Her last weight was 152lb on 08/15/24. She audibly sounded short of breath(wet),struggling to get words out with deep breaths.She also increased her lasix on her own to 80 mg/day. She has orthopnea, PND.She is not wearing compression socks. She tells me she elevates her LE and that a few days ago she could not see her toes due to all the swelling. She was seen by PCP 11/13/24 who changed her to Bumex 2 mg daily. She reports good output today. She works 12 hour shifts and is on her feet for at least 9 hours.She reports taking an atarax because is nervous and scared. She was advised to come to ER.

## 2024-11-14 NOTE — Clinical Note
[facility] EMERGENCY DEPARTMENT  [address]  [facility] [address]  Phone: [phone]    Alannah Barr was seen and treated in our emergency department on 11/14/2024.  She may return to work on 11/16/2024.         Thank you for choosing [facility].    Val Riggs, APRN

## 2024-11-14 NOTE — ED PROVIDER NOTES
Subjective   History of Present Illness  Patient is a 59-year-old female who presents to the ER with chief complaints of chest pain and shortness of breath.  She states that she began experiencing shortness of breath several days ago.  She describes her chest pain as fleeting in nature.  She states that she just twinges of pain that does not last long at all and very difficult to describe.  She is not experiencing any pain on exam.  She reports a chronic smoker's cough, denies this being worse than her usual however she has been using her inhaler more often than her baseline.  She has history of congestive heart failure as well as for cardiac stents.  She is followed by Dr. Patrick with cardiology.  She states that her Lasix was increased to 40 mg several days ago and then she independently increased her Lasix to 80 mg because she was not getting any urine output.  She was evaluated by her PCP yesterday who changed her medication to Bumex 2 mg.  She states she did have better output by changing to Bumex.  Patient denies any recorded fevers.  She complains of shortness of breath in particular with lying flat.  Due to symptoms described she came in the ER for evaluation and treatment.  Past medical history significant for CHF, chronic pain syndrome, COPD, tobacco dependence    Patient was initially evaluated in the pit and orders were placed        Review of Systems   Constitutional:  Negative for fever.   HENT:  Positive for congestion.    Respiratory:  Positive for cough and shortness of breath.    Cardiovascular:  Positive for chest pain and leg swelling.   Gastrointestinal: Negative.  Negative for abdominal pain, constipation, diarrhea, nausea and vomiting.   Genitourinary: Negative.  Negative for dysuria.   Musculoskeletal: Negative.    Skin: Negative.  Negative for rash.   All other systems reviewed and are negative.      Past Medical History:   Diagnosis Date    CHF (congestive heart failure)     Chronic pain  syndrome     COPD (chronic obstructive pulmonary disease)     Tobacco dependence        Allergies   Allergen Reactions    Levaquin [Levofloxacin] Shortness Of Breath    Morphine Shortness Of Breath    Codeine Unknown - High Severity    Doxycycline Unknown - High Severity    Metronidazole Unknown - High Severity    Naproxen Unknown - High Severity    Propoxyphene Unknown - High Severity    Tetanus Toxoids Unknown - High Severity    Tetracyclines & Related Unknown - High Severity       Past Surgical History:   Procedure Laterality Date    BREAST BIOPSY Right     CARDIAC CATHETERIZATION N/A 07/29/2023    Procedure: Left Heart Cath;  Surgeon: Osman Patrick DO;  Location:  [facility] CATH INVASIVE LOCATION;  Service: Cardiovascular;  Laterality: N/A;    CARDIAC CATHETERIZATION N/A 08/17/2023    Procedure: Percutaneous Coronary Intervention;  Surgeon: Osman Patrick DO;  Location:  [facility] CATH INVASIVE LOCATION;  Service: Cardiovascular;  Laterality: N/A;    CARDIAC CATHETERIZATION N/A 4/27/2024    Procedure: Left Heart Cath, coronaries, grafts, possible;  Surgeon: Win Ann MD;  Location:  [facility] CATH INVASIVE LOCATION;  Service: Cardiology;  Laterality: N/A;    CARDIAC CATHETERIZATION N/A 8/27/2024    Procedure: Left Heart Cath;  Surgeon: Osman Patrick DO;  Location:  [facility] CATH INVASIVE LOCATION;  Service: Cardiovascular;  Laterality: N/A;    CHOLECYSTECTOMY      CORONARY STENT PLACEMENT  2023    X 2    HYSTERECTOMY      KNEE ARTHROSCOPY W/ MEDIAL COLLATERAL LIGAMENT (MCL) REPAIR Right     x 2    TONSILLECTOMY         Family History   Problem Relation Age of Onset    COPD Mother     Diabetes Father     Heart failure Father     BRCA 1/2 Neg Hx        Social History     Socioeconomic History    Marital status: Single   Tobacco Use    Smoking status: Every Day     Current packs/day: 1.00     Average packs/day: 1 pack/day for 51.0 years (51.0 ttl pk-yrs)     Types: Cigarettes     Smokeless tobacco: Never   Vaping Use    Vaping status: Never Used   Substance and Sexual Activity    Alcohol use: Not Currently    Drug use: Not Currently    Sexual activity: Defer           Objective   Physical Exam  Vitals and nursing note reviewed.   Constitutional:       Appearance: She is well-developed.   HENT:      Head: Normocephalic and atraumatic.      Nose: Nose normal.   Eyes:      Conjunctiva/sclera: Conjunctivae normal.      Pupils: Pupils are equal, round, and reactive to light.   Cardiovascular:      Rate and Rhythm: Normal rate and regular rhythm.      Heart sounds: Normal heart sounds.   Pulmonary:      Effort: Pulmonary effort is normal.      Breath sounds: Rales present.   Abdominal:      General: Bowel sounds are normal.      Palpations: Abdomen is soft.   Musculoskeletal:         General: Normal range of motion.      Cervical back: Normal range of motion and neck supple.   Skin:     General: Skin is warm and dry.   Neurological:      Mental Status: She is alert and oriented to person, place, and time.   Psychiatric:         Behavior: Behavior normal.         Procedures           ED Course  ED Course as of 11/15/24 1623   Thu Nov 14, 2024   1720 Phone discussion with Dr. Adams, radiologist.  States that patient has an incidental finding of a right axillary mass which is concerning for a level 1 metastasis from the right breast as a primary source.  Reports patient will need a diagnostic breast workup which will include: Diagnostic mammography, right axillary biopsy, right axillary ultrasound.  Val HYMAN made aware.  [JS]      ED Course User Index  [JS] Jomar Denton PA-C                  HEART Score: 3                                      Medical Decision Making  Patient is a 59-year-old female who presents to the ER with chief complaints of chest pain and shortness of breath.  She states that she began experiencing shortness of breath several days ago.  She describes her chest  pain as fleeting in nature.  She states that she just twinges of pain that does not last long at all and very difficult to describe.  She is not experiencing any pain on exam.  She reports a chronic smoker's cough, denies this being worse than her usual however she has been using her inhaler more often than her baseline.  She has history of congestive heart failure as well as for cardiac stents.  She is followed by Dr. Patrick with cardiology.  She states that her Lasix was increased to 40 mg several days ago and then she independently increased her Lasix to 80 mg because she was not getting any urine output.  She was evaluated by her PCP yesterday who changed her medication to Bumex 2 mg.  She states she did have better output by changing to Bumex.  Patient denies any recorded fevers.  She complains of shortness of breath in particular with lying flat.  Due to symptoms described she came in the ER for evaluation and treatment.  Past medical history significant for CHF, chronic pain syndrome, COPD, tobacco dependence    Differential diagnosis includes but not limited to CHF exacerbation, COPD exacerbation, pneumonia, PE, ACS, viral illness, and other etiologies    Patient was initially evaluated in the pit and orders were placed    Labs Reviewed  COMPREHENSIVE METABOLIC PANEL - Abnormal; Notable for the following components:     Glucose                       149 (*)                Alkaline Phosphatase          119 (*)             All other components within normal limits         Narrative: GFR Normal >60                  Chronic Kidney Disease <60                  Kidney Failure <15                    PROTIME-INR - Abnormal; Notable for the following components:     INR                           0.89 (*)            All other components within normal limits  CBC WITH AUTO DIFFERENTIAL - Abnormal; Notable for the following components:     Hemoglobin                    10.1 (*)               Hematocrit                     33.1 (*)               MCH                           24.2 (*)               MCHC                          30.5 (*)               RDW                           16.7 (*)               Neutrophil %                  37.8 (*)               Eosinophil %                  9.8 (*)                Lymphocytes, Absolute         3.59 (*)               Eosinophils, Absolute         0.84 (*)            All other components within normal limits  RESPIRATORY PANEL PCR W/ COVID-19 (SARS-COV-2), NP SWAB IN UTM/VTP, 2 HR TAT - Normal         Narrative: In the setting of a positive respiratory panel with a viral infection PLUS a negative procalcitonin without other underlying concern for bacterial infection, consider observing off antibiotics or discontinuation of antibiotics and continue supportive care. If the respiratory panel is positive for atypical bacterial infection (Bordetella pertussis, Chlamydophila pneumoniae, or Mycoplasma pneumoniae), consider antibiotic de-escalation to target atypical bacterial infection.  TROPONIN - Normal         Narrative: High Sensitive Troponin T Reference Range:                  <14.0 ng/L- Negative Female for AMI                  <22.0 ng/L- Negative Male for AMI                  >=14 - Abnormal Female indicating possible myocardial injury.                  >=22 - Abnormal Male indicating possible myocardial injury.                   Clinicians would have to utilize clinical acumen, EKG, Troponin, and serial changes to determine if it is an Acute Myocardial Infarction or myocardial injury due to an underlying chronic condition.                                       BNP (IN-HOUSE) - Normal         Narrative: This assay is used as an aid in the diagnosis of individuals suspected of having heart failure. It can be used as an aid in the diagnosis of acute decompensated heart failure (ADHF) in patients presenting with signs and symptoms of ADHF to the emergency department (ED). In addition,  "NT-proBNP of <300 pg/mL indicates ADHF is not likely.                                    Age Range Result Interpretation  NT-proBNP Concentration (pg/mL:                                                      <50             Positive            >450                                   Gray                 300-450                                    Negative             <300                                    50-75           Positive            >900                                  Farrell                300-900                                  Negative            <300                                                      >75             Positive            >1800                                  Farrell                300-1800                                  Negative            <300  PROCALCITONIN - Normal         Narrative: As a Marker for Sepsis (Non-Neonates):                                    1. <0.5 ng/mL represents a low risk of severe sepsis and/or septic shock.                  2. >2 ng/mL represents a high risk of severe sepsis and/or septic shock.                                    As a Marker for Lower Respiratory Tract Infections that require antibiotic therapy:                                    PCT on Admission    Antibiotic Therapy       6-12 Hrs later                                    >0.5                Strongly Recommended                  >0.25 - <0.5        Recommended                   0.1 - 0.25          Discouraged              Remeasure/reassess PCT                  <0.1                Strongly Discouraged     Remeasure/reassess PCT                                    As 28 day mortality risk marker: \"Change in Procalcitonin Result\" (>80% or <=80%) if Day 0 (or Day 1) and Day 4 values are available. Refer to http://www.[employer]-pct-calculator.com                                    Change in PCT <=80%                  A decrease of PCT levels below or equal to 80% defines a positive change in PCT test " result representing a higher risk for 28-day all-cause mortality of patients diagnosed with severe sepsis for septic shock.                                    Change in PCT >80%                  A decrease of PCT levels of more than 80% defines a negative change in PCT result representing a lower risk for 28-day all-cause mortality of patients diagnosed with severe sepsis or septic shock.                     MAGNESIUM - Normal  HIGH SENSITIVITIY TROPONIN T 2HR - Normal         Narrative: High Sensitive Troponin T Reference Range:                  <14.0 ng/L- Negative Female for AMI                  <22.0 ng/L- Negative Male for AMI                  >=14 - Abnormal Female indicating possible myocardial injury.                  >=22 - Abnormal Male indicating possible myocardial injury.                   Clinicians would have to utilize clinical acumen, EKG, Troponin, and serial changes to determine if it is an Acute Myocardial Infarction or myocardial injury due to an underlying chronic condition.                                       CBC AND DIFFERENTIAL     CT Angiogram Chest   Final Result    1.  No evidence of pulmonary embolus.    2.  Extensive bilateral small groundglass centrilobular nodules. Primary    differential considerations would include smoking-related respiratory    bronchiolitis (respiratory bronchiolitis-interstitial lung disease),    infectious bronchiolitis or perhaps hypersensitivity pneumonitis.    3.  There is a 2 x 1.6 cm right axillary mass which is seen on axial    image #33. Primary concern is that this is a right axillary ana cristina    metastasis from occult malignancy such as right breast primary. I would    recommend right axillary ultrasound and diagnostic mammogram.         Findings and recommendations were discussed with CHON WILKINS on    11/14/2024 5:18 PM.              This report was signed and finalized on 11/14/2024 5:21 PM by Dr Marv Adams.          US Venous Doppler Lower  Extremity Right (duplex)   Final Result    Impression: There is no evidence of deep venous thrombosis or    superficial thrombophlebitis of the right lower extremity.         Comments: Right lower extremity venous duplex exam was performed using    color Doppler flow, Doppler wave form analysis, and grayscale imaging,    with and without compression. There is no evidence of deep venous    thrombosis of the common femoral, superficial femoral, popliteal,    posterior tibial, and peroneal veins. There is no thrombus identified in    the saphenofemoral junction or the greater saphenous vein.               This report was signed and finalized on 11/15/2024 11:59 AM by Dr. Jose Miguel Miles MD.          XR Chest 1 View   Final Result    1. No acute disease.                             This report was signed and finalized on 11/14/2024 4:25 PM by Dr. Narendra Morgan MD.       Patient's labs were unremarkable.  Venous duplex Doppler ultrasound was negative.  CTA of the chest was negative for PE however there were some abnormality findings including groundglass nodules, possible bronchiolitis versus pneumonitis.  We will treat accordingly.  Additionally she has a mass noted in the right axilla.  We have recommended for patient to have a full diagnostic breast workup.  She will need to speak with her PCP tomorrow regarding this issue.  Recommend mammogram, ultrasound as well as biopsy of that mass.  All was thoroughly discussed with patient along with her family members at bedside.  She will also need to follow-up with her cardiologist regarding her chest pain symptoms.  She will be discharged home shortly in stable condition.  She has no chest pain on reexam and is anxious for discharge.    Problems Addressed:  Bronchiolitis: acute illness or injury  Chest pain, unspecified type: acute illness or injury  Mass of right axilla: acute illness or injury    Amount and/or Complexity of Data Reviewed  Labs: ordered.  Decision-making details documented in ED Course.  Radiology: ordered. Decision-making details documented in ED Course.  ECG/medicine tests: ordered. Decision-making details documented in ED Course.    Risk  Prescription drug management.        Final diagnoses:   Chest pain, unspecified type   Bronchiolitis   Mass of right axilla       ED Disposition  ED Disposition       ED Disposition   Discharge    Condition   Good    Comment   --               No follow-up provider specified.       Medication List        New Prescriptions      amoxicillin-clavulanate 875-125 MG per tablet  Commonly known as: AUGMENTIN  Take 1 tablet by mouth 2 (Two) Times a Day for 10 days.     methylPREDNISolone 4 MG dose pack  Commonly known as: MEDROL  Take as directed on package instructions.            Changed      * albuterol (2.5 MG/3ML) 0.083% nebulizer solution  Commonly known as: PROVENTIL  Take 2.5 mg by nebulization Every 4 (Four) Hours As Needed for Wheezing.  What changed: You were already taking a medication with the same name, and this prescription was added. Make sure you understand how and when to take each.     * albuterol sulfate  (90 Base) MCG/ACT inhaler  Commonly known as: PROVENTIL HFA;VENTOLIN HFA;PROAIR HFA  What changed: Another medication with the same name was added. Make sure you understand how and when to take each.           * This list has 2 medication(s) that are the same as other medications prescribed for you. Read the directions carefully, and ask your doctor or other care provider to review them with you.                   Where to Get Your Medications        These medications were sent to [facility] - [place], [place] - [address] [phone] [facility] [phone]   [address] [address]      Phone: [phone]   albuterol (2.5 MG/3ML) 0.083% nebulizer solution  amoxicillin-clavulanate 875-125 MG per tablet  methylPREDNISolone 4 MG dose pack            Val Riggs,  APRN  11/15/24 [id number]

## 2024-11-14 NOTE — ED NOTES
MEDICAL SCREENING:    Reason for Visit: Chest pain, SOB    Patient is a 59-year-old female presenting to ED with chest pain, shortness of breath, lower extremity muscle aches.  PMH significant for CHF, chronic pain syndrome, COPD, history cardiac stent placement x 4.  Patient states over the past month she has had a 15 pound unintentional weight gain for which over the past 3 days she has felt significant orthopnea, lower extremity swelling and shortness of breath both with rest and exertion.  Patient states that she tried using her albuterol inhaler with no relief of her symptoms despite having a nonproductive cough.  Patient reports intermittent chest pains with no localized symptoms.  Patient denies any associated diaphoresis or nausea.  Patient tried increasing her Lasix from her 20 mg home Lasix dose up to 40 mg on her own with no improvement for which yesterday she went to her primary care provider and was started on 2 mg Bumex noting that today she has urinated more than she has over the past 2 days.  Patient states that her symptoms continue to worsen and she began developing muscle cramping and aching in her right calf and right thigh which concerned her for which she called Dr. Patrick and was advised to go to the ER for further evaluation.  Patient did state over the past few days she has been taking increased amounts of Atarax as she is having unexplained anxiety due to her heart.  Patient states her most recent heart catheterization was in August at which time she had her fourth cardiac stent placed with no cardiac echoes, stress test or other evaluation since.  Patient denies use of any other medications since the Bumex, any known sick contact, and presents at this time for further evaluation.    Patient states that she is working on quitting smoking and is currently down to 1/2 pack/day.  Patient denies use of any hormone replacement.  Patient denies any recent travel/surgical history/immobilization  in the past 6 weeks.  Patient denies any history of known blood clots.    Patient was advised of need for workup to include labs, venous Doppler studies, chest x-ray and CT for which she is amenable with no further questions, concerns, or needs at this time.    Patient initially seen in triage.  The patient was advised further evaluation and diagnostic testing will be needed, some of the treatment and testing will be initiated in the lobby in order to begin the process.  The patient will be returned to the waiting area for the time being and possibly be re-assessed by a subsequent ED provider.  The patient will be brought back to the treatment area in as timely manner as possible.      Jomar Denton PA-C  11/14/24 5711

## 2024-11-15 NOTE — DISCHARGE INSTRUCTIONS
You have an axillary mass that needs further evaluation. Recommend complete diagnostic breast work up which would include mammogram, axillary US and biopsy.   F/u with pcp tomorrow regarding outpatient work up. Also, f/u with cardiology regarding chest pain.   Return with any worsening symptoms.

## 2024-11-16 LAB
QT INTERVAL: 394 MS
QTC INTERVAL: 454 MS

## 2024-12-10 ENCOUNTER — TRANSCRIBE ORDERS (OUTPATIENT)
Dept: ADMINISTRATIVE | Facility: HOSPITAL | Age: 59
End: 2024-12-10
Payer: COMMERCIAL

## 2024-12-10 DIAGNOSIS — R22.31 LUMP OF AXILLA, RIGHT: Primary | ICD-10-CM

## 2024-12-11 LAB
NCCN CRITERIA FLAG: NORMAL
TYRER CUZICK SCORE: 4.9

## 2024-12-16 ENCOUNTER — HOSPITAL ENCOUNTER (OUTPATIENT)
Dept: ULTRASOUND IMAGING | Facility: HOSPITAL | Age: 59
Discharge: HOME | End: 2024-12-16
Payer: COMMERCIAL

## 2024-12-16 ENCOUNTER — HOSPITAL ENCOUNTER (OUTPATIENT)
Dept: MAMMOGRAPHY | Facility: HOSPITAL | Age: 59
Discharge: HOME | End: 2024-12-16
Payer: COMMERCIAL

## 2024-12-16 DIAGNOSIS — R22.31 LUMP OF AXILLA, RIGHT: ICD-10-CM

## 2024-12-16 PROCEDURE — 77066 DX MAMMO INCL CAD BI: CPT

## 2024-12-16 PROCEDURE — G0279 TOMOSYNTHESIS, MAMMO: HCPCS

## 2024-12-16 PROCEDURE — 76882 US LMTD JT/FCL EVL NVASC XTR: CPT

## 2024-12-19 ENCOUNTER — OFFICE VISIT (OUTPATIENT)
Dept: CARDIOLOGY | Facility: CLINIC | Age: 59
End: 2024-12-19
Payer: COMMERCIAL

## 2024-12-19 VITALS
WEIGHT: 166 LBS | SYSTOLIC BLOOD PRESSURE: 124 MMHG | BODY MASS INDEX: 32.59 KG/M2 | DIASTOLIC BLOOD PRESSURE: 72 MMHG | HEIGHT: 60 IN | HEART RATE: 78 BPM

## 2024-12-19 DIAGNOSIS — J44.9 CHRONIC OBSTRUCTIVE PULMONARY DISEASE, UNSPECIFIED COPD TYPE: ICD-10-CM

## 2024-12-19 DIAGNOSIS — I50.32 CHRONIC DIASTOLIC CHF (CONGESTIVE HEART FAILURE): ICD-10-CM

## 2024-12-19 DIAGNOSIS — I25.118 CORONARY ARTERY DISEASE OF NATIVE ARTERY OF NATIVE HEART WITH STABLE ANGINA PECTORIS: Primary | ICD-10-CM

## 2024-12-19 PROCEDURE — 99214 OFFICE O/P EST MOD 30 MIN: CPT | Performed by: EMERGENCY MEDICINE

## 2024-12-19 RX ORDER — CLOPIDOGREL BISULFATE 75 MG/1
75 TABLET ORAL DAILY
Qty: 90 TABLET | Refills: 3 | Status: SHIPPED | OUTPATIENT
Start: 2024-12-19

## 2024-12-19 RX ORDER — BUMETANIDE 2 MG/1
2 TABLET ORAL 2 TIMES DAILY
Qty: 60 TABLET | Refills: 0 | Status: SHIPPED | OUTPATIENT
Start: 2024-12-19

## 2024-12-19 RX ORDER — RANOLAZINE 500 MG/1
500 TABLET, EXTENDED RELEASE ORAL 2 TIMES DAILY
Qty: 180 TABLET | Refills: 3 | Status: SHIPPED | OUTPATIENT
Start: 2024-12-19

## 2024-12-19 RX ORDER — SPIRONOLACTONE 25 MG/1
12.5 TABLET ORAL EVERY OTHER DAY
Qty: 45 TABLET | Refills: 3 | Status: SHIPPED | OUTPATIENT
Start: 2024-12-19

## 2024-12-19 RX ORDER — ISOSORBIDE MONONITRATE 30 MG/1
30 TABLET, EXTENDED RELEASE ORAL DAILY
Qty: 90 TABLET | Refills: 3 | Status: SHIPPED | OUTPATIENT
Start: 2024-12-19

## 2024-12-19 RX ORDER — CARVEDILOL 6.25 MG/1
6.25 TABLET ORAL 2 TIMES DAILY WITH MEALS
Qty: 180 TABLET | Refills: 3 | Status: SHIPPED | OUTPATIENT
Start: 2024-12-19

## 2024-12-19 RX ORDER — POTASSIUM CHLORIDE 750 MG/1
20 TABLET, EXTENDED RELEASE ORAL DAILY
Qty: 60 TABLET | Refills: 0 | Status: SHIPPED | OUTPATIENT
Start: 2024-12-19

## 2024-12-19 RX ORDER — SACUBITRIL AND VALSARTAN 24; 26 MG/1; MG/1
1 TABLET, FILM COATED ORAL 2 TIMES DAILY
Qty: 180 TABLET | Refills: 3 | Status: SHIPPED | OUTPATIENT
Start: 2024-12-19

## 2024-12-19 RX ORDER — POTASSIUM CHLORIDE 750 MG/1
20 TABLET, EXTENDED RELEASE ORAL DAILY
Qty: 180 TABLET | Refills: 3 | Status: SHIPPED | OUTPATIENT
Start: 2024-12-19 | End: 2024-12-19 | Stop reason: SDUPTHER

## 2024-12-19 RX ORDER — ATORVASTATIN CALCIUM 80 MG/1
80 TABLET, FILM COATED ORAL NIGHTLY
Qty: 90 TABLET | Refills: 3 | Status: SHIPPED | OUTPATIENT
Start: 2024-12-19

## 2024-12-19 RX ORDER — BUMETANIDE 2 MG/1
2 TABLET ORAL DAILY
Qty: 180 TABLET | Refills: 3 | Status: SHIPPED | OUTPATIENT
Start: 2024-12-19 | End: 2024-12-19 | Stop reason: SDUPTHER

## 2024-12-23 ENCOUNTER — APPOINTMENT (OUTPATIENT)
Dept: GENERAL RADIOLOGY | Facility: HOSPITAL | Age: 59
End: 2024-12-23
Payer: COMMERCIAL

## 2024-12-23 ENCOUNTER — HOSPITAL ENCOUNTER (EMERGENCY)
Facility: HOSPITAL | Age: 59
Discharge: HOME | End: 2024-12-23
Attending: FAMILY MEDICINE | Admitting: FAMILY MEDICINE
Payer: COMMERCIAL

## 2024-12-23 ENCOUNTER — HOSPITAL ENCOUNTER (OUTPATIENT)
Dept: ULTRASOUND IMAGING | Facility: HOSPITAL | Age: 59
Discharge: HOME | End: 2024-12-23
Payer: COMMERCIAL

## 2024-12-23 ENCOUNTER — APPOINTMENT (OUTPATIENT)
Dept: CT IMAGING | Facility: HOSPITAL | Age: 59
End: 2024-12-23
Payer: COMMERCIAL

## 2024-12-23 VITALS
HEIGHT: 60 IN | SYSTOLIC BLOOD PRESSURE: 111 MMHG | RESPIRATION RATE: 20 BRPM | TEMPERATURE: 97.7 F | BODY MASS INDEX: 32.79 KG/M2 | WEIGHT: 167 LBS | DIASTOLIC BLOOD PRESSURE: 54 MMHG | OXYGEN SATURATION: 97 % | HEART RATE: 78 BPM

## 2024-12-23 DIAGNOSIS — R92.8 ABNORMAL MAMMOGRAM: ICD-10-CM

## 2024-12-23 DIAGNOSIS — E87.6 HYPOKALEMIA: ICD-10-CM

## 2024-12-23 DIAGNOSIS — M54.6 THORACIC BACK PAIN, UNSPECIFIED BACK PAIN LATERALITY, UNSPECIFIED CHRONICITY: ICD-10-CM

## 2024-12-23 DIAGNOSIS — R07.81 PLEURITIC CHEST PAIN: Primary | ICD-10-CM

## 2024-12-23 DIAGNOSIS — D72.829 LEUKOCYTOSIS, UNSPECIFIED TYPE: ICD-10-CM

## 2024-12-23 LAB
ALBUMIN SERPL-MCNC: 4.1 G/DL (ref 3.5–5.2)
ALBUMIN/GLOB SERPL: 1.2 G/DL
ALP SERPL-CCNC: 131 U/L (ref 39–117)
ALT SERPL W P-5'-P-CCNC: 11 U/L (ref 1–33)
ANION GAP SERPL CALCULATED.3IONS-SCNC: 10 MMOL/L (ref 5–15)
APTT PPP: 25.2 SECONDS (ref 24.5–36)
AST SERPL-CCNC: 15 U/L (ref 1–32)
BASOPHILS # BLD AUTO: 0.06 10*3/MM3 (ref 0–0.2)
BASOPHILS NFR BLD AUTO: 0.5 % (ref 0–1.5)
BILIRUB SERPL-MCNC: 0.2 MG/DL (ref 0–1.2)
BUN SERPL-MCNC: 10 MG/DL (ref 6–20)
BUN/CREAT SERPL: 13.3 (ref 7–25)
CALCIUM SPEC-SCNC: 9.3 MG/DL (ref 8.6–10.5)
CHLORIDE SERPL-SCNC: 101 MMOL/L (ref 98–107)
CO2 SERPL-SCNC: 25 MMOL/L (ref 22–29)
CREAT SERPL-MCNC: 0.75 MG/DL (ref 0.57–1)
D-LACTATE SERPL-SCNC: 1.1 MMOL/L (ref 0.5–2)
DEPRECATED RDW RBC AUTO: 49.2 FL (ref 37–54)
EGFRCR SERPLBLD CKD-EPI 2021: 93 ML/MIN/1.73
EOSINOPHIL # BLD AUTO: 0.33 10*3/MM3 (ref 0–0.4)
EOSINOPHIL NFR BLD AUTO: 2.9 % (ref 0.3–6.2)
ERYTHROCYTE [DISTWIDTH] IN BLOOD BY AUTOMATED COUNT: 17.2 % (ref 12.3–15.4)
GEN 5 1HR TROPONIN T REFLEX: <6 NG/L
GLOBULIN UR ELPH-MCNC: 3.3 GM/DL
GLUCOSE SERPL-MCNC: 110 MG/DL (ref 65–99)
HCT VFR BLD AUTO: 32 % (ref 34–46.6)
HGB BLD-MCNC: 9.5 G/DL (ref 12–15.9)
HOLD SPECIMEN: NORMAL
IMM GRANULOCYTES # BLD AUTO: 0.03 10*3/MM3 (ref 0–0.05)
IMM GRANULOCYTES NFR BLD AUTO: 0.3 % (ref 0–0.5)
INR PPP: 0.88 (ref 0.91–1.09)
LYMPHOCYTES # BLD AUTO: 4.13 10*3/MM3 (ref 0.7–3.1)
LYMPHOCYTES NFR BLD AUTO: 36.7 % (ref 19.6–45.3)
MAGNESIUM SERPL-MCNC: 1.9 MG/DL (ref 1.6–2.6)
MCH RBC QN AUTO: 23.4 PG (ref 26.6–33)
MCHC RBC AUTO-ENTMCNC: 29.7 G/DL (ref 31.5–35.7)
MCV RBC AUTO: 78.8 FL (ref 79–97)
MONOCYTES # BLD AUTO: 1.27 10*3/MM3 (ref 0.1–0.9)
MONOCYTES NFR BLD AUTO: 11.3 % (ref 5–12)
NEUTROPHILS NFR BLD AUTO: 48.3 % (ref 42.7–76)
NEUTROPHILS NFR BLD AUTO: 5.44 10*3/MM3 (ref 1.7–7)
NRBC BLD AUTO-RTO: 0 /100 WBC (ref 0–0.2)
NT-PROBNP SERPL-MCNC: 241 PG/ML (ref 0–900)
PLATELET # BLD AUTO: 371 10*3/MM3 (ref 140–450)
PMV BLD AUTO: 10.5 FL (ref 6–12)
POTASSIUM SERPL-SCNC: 3.4 MMOL/L (ref 3.5–5.2)
PROT SERPL-MCNC: 7.4 G/DL (ref 6–8.5)
PROTHROMBIN TIME: 12.3 SECONDS (ref 11.8–14.8)
RBC # BLD AUTO: 4.06 10*6/MM3 (ref 3.77–5.28)
SODIUM SERPL-SCNC: 136 MMOL/L (ref 136–145)
TROPONIN T NUMERIC DELTA: NORMAL
TROPONIN T SERPL HS-MCNC: 6 NG/L
WBC NRBC COR # BLD AUTO: 11.26 10*3/MM3 (ref 3.4–10.8)

## 2024-12-23 PROCEDURE — 83880 ASSAY OF NATRIURETIC PEPTIDE: CPT | Performed by: FAMILY MEDICINE

## 2024-12-23 PROCEDURE — 84484 ASSAY OF TROPONIN QUANT: CPT | Performed by: FAMILY MEDICINE

## 2024-12-23 PROCEDURE — 76942 ECHO GUIDE FOR BIOPSY: CPT

## 2024-12-23 PROCEDURE — 36415 COLL VENOUS BLD VENIPUNCTURE: CPT

## 2024-12-23 PROCEDURE — 88172 CYTP DX EVAL FNA 1ST EA SITE: CPT | Performed by: FAMILY MEDICINE

## 2024-12-23 PROCEDURE — 88112 CYTOPATH CELL ENHANCE TECH: CPT | Performed by: FAMILY MEDICINE

## 2024-12-23 PROCEDURE — 85025 COMPLETE CBC W/AUTO DIFF WBC: CPT | Performed by: FAMILY MEDICINE

## 2024-12-23 PROCEDURE — 25510000001 IOPAMIDOL PER 1 ML: Performed by: FAMILY MEDICINE

## 2024-12-23 PROCEDURE — 71275 CT ANGIOGRAPHY CHEST: CPT

## 2024-12-23 PROCEDURE — 96374 THER/PROPH/DIAG INJ IV PUSH: CPT

## 2024-12-23 PROCEDURE — 93005 ELECTROCARDIOGRAM TRACING: CPT | Performed by: FAMILY MEDICINE

## 2024-12-23 PROCEDURE — 25010000002 ONDANSETRON PER 1 MG: Performed by: FAMILY MEDICINE

## 2024-12-23 PROCEDURE — 96375 TX/PRO/DX INJ NEW DRUG ADDON: CPT

## 2024-12-23 PROCEDURE — 93005 ELECTROCARDIOGRAM TRACING: CPT

## 2024-12-23 PROCEDURE — 83605 ASSAY OF LACTIC ACID: CPT | Performed by: FAMILY MEDICINE

## 2024-12-23 PROCEDURE — 80053 COMPREHEN METABOLIC PANEL: CPT | Performed by: FAMILY MEDICINE

## 2024-12-23 PROCEDURE — 99285 EMERGENCY DEPT VISIT HI MDM: CPT

## 2024-12-23 PROCEDURE — 85730 THROMBOPLASTIN TIME PARTIAL: CPT | Performed by: FAMILY MEDICINE

## 2024-12-23 PROCEDURE — 85610 PROTHROMBIN TIME: CPT | Performed by: FAMILY MEDICINE

## 2024-12-23 PROCEDURE — 83735 ASSAY OF MAGNESIUM: CPT | Performed by: FAMILY MEDICINE

## 2024-12-23 PROCEDURE — 25010000002 FENTANYL CITRATE (PF) 50 MCG/ML SOLUTION: Performed by: FAMILY MEDICINE

## 2024-12-23 PROCEDURE — 71045 X-RAY EXAM CHEST 1 VIEW: CPT

## 2024-12-23 RX ORDER — IOPAMIDOL 755 MG/ML
100 INJECTION, SOLUTION INTRAVASCULAR
Status: COMPLETED | OUTPATIENT
Start: 2024-12-23 | End: 2024-12-23

## 2024-12-23 RX ORDER — ONDANSETRON 2 MG/ML
4 INJECTION INTRAMUSCULAR; INTRAVENOUS ONCE
Status: COMPLETED | OUTPATIENT
Start: 2024-12-23 | End: 2024-12-23

## 2024-12-23 RX ORDER — FENTANYL CITRATE 50 UG/ML
25 INJECTION, SOLUTION INTRAMUSCULAR; INTRAVENOUS ONCE
Status: COMPLETED | OUTPATIENT
Start: 2024-12-23 | End: 2024-12-23

## 2024-12-23 RX ORDER — SODIUM CHLORIDE 0.9 % (FLUSH) 0.9 %
10 SYRINGE (ML) INJECTION AS NEEDED
Status: DISCONTINUED | OUTPATIENT
Start: 2024-12-23 | End: 2024-12-23 | Stop reason: HOSPADM

## 2024-12-23 RX ORDER — LIDOCAINE HYDROCHLORIDE 10 MG/ML
5 INJECTION, SOLUTION INFILTRATION; PERINEURAL ONCE
Status: DISPENSED | OUTPATIENT
Start: 2024-12-23

## 2024-12-23 RX ORDER — METHYLPREDNISOLONE 4 MG/1
TABLET ORAL
Qty: 21 EACH | Refills: 0 | Status: SHIPPED | OUTPATIENT
Start: 2024-12-23

## 2024-12-23 RX ADMIN — ONDANSETRON 4 MG: 2 INJECTION INTRAMUSCULAR; INTRAVENOUS at 12:59

## 2024-12-23 RX ADMIN — IOPAMIDOL 75 ML: 755 INJECTION, SOLUTION INTRAVENOUS at 13:38

## 2024-12-23 RX ADMIN — FENTANYL CITRATE 25 MCG: 50 INJECTION, SOLUTION INTRAMUSCULAR; INTRAVENOUS at 12:59

## 2024-12-23 NOTE — ED PROVIDER NOTES
HPI:     Patient is a 59-year-old white female presents to the emergency room with a complaint of having chest and back pain.  She states that the pain is in her back and radiates to her chest on the left side right up under her left breast.  Worsening with taking a deep breath.  She states is baseline it is 7 out of 10 and will it increases on its own to 10 out of 10 intermittently.  She denies any trauma.  She denies any fevers at home.  She states that she was recently seen in the hospital and she had a D-dimer that was elevated.  They were concerned for possible blood clot she states that she ended up getting a CT scan but has no results of that and this was done about a week ago.  She states since that time the symptoms have actually gotten worse.    REVIEW OF SYSTEMS  CONSTITUTIONAL:  No complaints of fever, chills,or weakness  EYES:  No complaints of discharge   ENT: No complaints of sore throat or ear pain  CARDIOVASCULAR: Positive for left-sided chest pain underneath the left breast RESPIRATORY: Positive for shortness of breath and painful inspiration GI:  No complaints of abdominal pain, nausea, vomiting, or diarrhea  MUSCULOSKELETAL:  No complaints of back pain  SKIN:  No complaints of rash  NEUROLOGIC:  No complaints of headache, focal weakness, or sensory changes  ENDOCRINE:  No complaints of polyuria or polydipsia  LYMPHATIC:  No complaints of swollen glands  GENITOURINARY: No complaints of urinary frequency or hematuria        PAST MEDICAL HISTORY  Past Medical History:   Diagnosis Date    CHF (congestive heart failure)     Chronic pain syndrome     COPD (chronic obstructive pulmonary disease)     Tobacco dependence        FAMILY HISTORY  Family History   Problem Relation Age of Onset    COPD Mother     Diabetes Father     Heart failure Father     BRCA 1/2 Neg Hx        SOCIAL HISTORY  Social History     Socioeconomic History    Marital status: Single   Tobacco Use    Smoking status: Every Day      Current packs/day: 1.00     Average packs/day: 1 pack/day for 51.0 years (51.0 ttl pk-yrs)     Types: Cigarettes    Smokeless tobacco: Never   Vaping Use    Vaping status: Never Used   Substance and Sexual Activity    Alcohol use: Not Currently    Drug use: Not Currently    Sexual activity: Defer       IMMUNIZATION HISTORY  Deferred to primary care physician.    SURGICAL HISTORY  Past Surgical History:   Procedure Laterality Date    BREAST BIOPSY Right     CARDIAC CATHETERIZATION N/A 07/29/2023    Procedure: Left Heart Cath;  Surgeon: Osman Patrick DO;  Location:  [facility];  Service: Cardiovascular;  Laterality: N/A;    CARDIAC CATHETERIZATION N/A 08/17/2023    Procedure: Percutaneous Coronary Intervention;  Surgeon: Osman Patrick DO;  Location:  [facility];  Service: Cardiovascular;  Laterality: N/A;    CARDIAC CATHETERIZATION N/A 4/27/2024    Procedure: Left Heart Cath, coronaries, grafts, possible;  Surgeon: Win Ann MD;  Location:  [facility];  Service: Cardiology;  Laterality: N/A;    CARDIAC CATHETERIZATION N/A 8/27/2024    Procedure: Left Heart Cath;  Surgeon: Osman Patrick DO;  Location:  [facility];  Service: Cardiovascular;  Laterality: N/A;    CHOLECYSTECTOMY      CORONARY STENT PLACEMENT  2023    X 2    HYSTERECTOMY      KNEE ARTHROSCOPY W/ MEDIAL COLLATERAL LIGAMENT (MCL) REPAIR Right     x 2    TONSILLECTOMY      US GUIDED LYMPH NODE BIOPSY  12/23/2024       CURRENT MEDICATIONS    Current Facility-Administered Medications:     lidocaine (XYLOCAINE) 1 % injection 5 mL, 5 mL, Subcutaneous, Once, Vazquez Finch MD    [COMPLETED] Insert Peripheral IV, , , Once **AND** sodium chloride 0.9 % flush 10 mL, 10 mL, Intravenous, PRN, Telly Pruett Jr., MD    Current Outpatient Medications:     methylPREDNISolone (MEDROL) 4 MG dose pack, Take as directed on package  instructions., Disp: 21 each, Rfl: 0    tiZANidine (ZANAFLEX) 4 MG tablet, Take 1 tablet by mouth 2 (Two) Times a Day As Needed for Muscle Spasms for up to 6 days., Disp: 12 tablet, Rfl: 0    albuterol (PROVENTIL) (2.5 MG/3ML) 0.083% nebulizer solution, Take 2.5 mg by nebulization Every 4 (Four) Hours As Needed for Wheezing., Disp: 25 each, Rfl: 0    albuterol sulfate  (90 Base) MCG/ACT inhaler, Inhale 2 puffs Every 4 (Four) Hours As Needed for Wheezing., Disp: , Rfl:     aspirin 81 MG EC tablet, Take 1 tablet by mouth Daily., Disp: 30 tablet, Rfl: 0    atorvastatin (LIPITOR) 80 MG tablet, Take 1 tablet by mouth Every Night., Disp: 90 tablet, Rfl: 3    bumetanide (BUMEX) 2 MG tablet, Take 1 tablet by mouth 2 (Two) Times a Day., Disp: 60 tablet, Rfl: 0    carvedilol (COREG) 6.25 MG tablet, Take 1 tablet by mouth 2 (Two) Times a Day With Meals., Disp: 180 tablet, Rfl: 3    clopidogrel (PLAVIX) 75 MG tablet, Take 1 tablet by mouth Daily., Disp: 90 tablet, Rfl: 3    FLUoxetine (PROzac) 20 MG capsule, Take 2 capsules by mouth Daily., Disp: , Rfl:     gabapentin (NEURONTIN) 600 MG tablet, Take 1 tablet by mouth 3 (Three) Times a Day., Disp: , Rfl:     HYDROcodone-acetaminophen (NORCO)  MG per tablet, Take 1 tablet by mouth 4 (Four) Times a Day., Disp: , Rfl:     hydrOXYzine pamoate (VISTARIL) 25 MG capsule, Take 1 capsule by mouth 3 (Three) Times a Day As Needed for Itching or Anxiety., Disp: , Rfl:     isosorbide mononitrate (IMDUR) 30 MG 24 hr tablet, Take 1 tablet by mouth Daily., Disp: 90 tablet, Rfl: 3    nitroglycerin (Nitrostat) 0.4 MG SL tablet, Place 1 tablet under the tongue Every 5 (Five) Minutes As Needed for Chest Pain. Take no more than 3 doses in 15 minutes., Disp: 50 tablet, Rfl: 0    pantoprazole (PROTONIX) 40 MG EC tablet, Take 1 tablet by mouth 2 (Two) Times a Day., Disp: , Rfl:     potassium chloride 10 MEQ CR tablet, Take 2 tablets by mouth Daily., Disp: 60 tablet, Rfl: 0    ranolazine  "(RANEXA) 500 MG 12 hr tablet, Take 1 tablet by mouth 2 (Two) Times a Day., Disp: 180 tablet, Rfl: 3    sacubitril-valsartan (Entresto) 24-26 MG tablet, Take 1 tablet by mouth 2 (Two) Times a Day., Disp: 180 tablet, Rfl: 3    spironolactone (ALDACTONE) 25 MG tablet, Take 0.5 tablets by mouth Every Other Day., Disp: 45 tablet, Rfl: 3    topiramate (TOPAMAX) 50 MG tablet, Take 1 tablet by mouth Daily., Disp: , Rfl:     zolpidem (AMBIEN) 10 MG tablet, Take 1 tablet by mouth At Night As Needed for Sleep., Disp: , Rfl:     ALLERGIES  Allergies   Allergen Reactions    Levaquin [Levofloxacin] Shortness Of Breath    Morphine Shortness Of Breath    Codeine Unknown - High Severity    Doxycycline Unknown - High Severity    Metronidazole Unknown - High Severity    Naproxen Unknown - High Severity    Propoxyphene Unknown - High Severity    Tetanus Toxoids Unknown - High Severity    Tetracyclines & Related Unknown - High Severity           Cardiac exam    VITAL SIGNS:  /95   Pulse 77   Temp 97.7 °F (36.5 °C) (Oral)   Resp 22   Ht 152.4 cm (60\")   Wt 75.8 kg (167 lb)   SpO2 100%   BMI 32.61 kg/m²     Constitutional: Patient is alert and in no distress.  Patient with severe low back and left-sided chest discomfort.    ENT: There is a normal pharynx with no acute erythema or exudate and oral mucosa is moist.  Nose is clear with no drainage.  Tympanic membranes intact and nonerythemic    Respiratory: Patient is clear to auscultation bilaterally with no wheezing or rhonchi.  Chest wall is tender to palpation.  There are no external lesions on the chest.  There is no crepitance    Cardiovascular: S1-S2 with a murmur.    Abdomen: Soft, nontender, and  bowel sounds are normal in all 4 quadrants.  There is no rebound or guarding noted.  There is no abdominal distention or hepatosplenomegaly.    Genitourinary: Patient is voiding appropriately.    Integument: No acute lesions noted and color appears to be normal.    [place] Coma " Scale: Total score 15    Neurological: Patient is alert and oriented x4 and no acute findings noted.  Speech is fluent and cognition is normal.  No evidence of acute CVA.  Cranial nerves II through XII intact.  Patient with normal motor function as well as reflexes and sensation        RADIOLOGY/PROCEDURES      CT Angiogram Chest   Final Result   1. No evidence of pulmonary embolism is identified, there are stable   diffuse small foci of groundglass density within both lungs with mild   interstitial prominence as before but no lung consolidation. No solid   pulmonary nodule or mass. The differential diagnosis is similar compared   with the previous chest CT and includes respiratory bronchiolitis.   2. Slight interval decrease in size of the enlarged lymph node seen in   the right axilla before, currently measuring 1.6 x 1.8 cm, compared with   1.6 x 2.0 cm on the previous CT. This lymph node was biopsied with   ultrasound guidance today. Pathology results are pending.                   This report was signed and finalized on 12/23/2024 2:09 PM by Dr. Zane Hampton MD.          XR Chest 1 View   Final Result           No acute abnormality.                                                                               This report was signed and finalized on 12/23/2024 1:17 PM by Dr. Zane Hampton MD.                 FUTURE APPOINTMENTS     Future Appointments   Date Time Provider Department Center   1/6/2025  1:30 PM Ricky Patrick, DO [facility]   6/23/2025  8:45 AM Osman Patrick, DO [facility]          EKG (reviewed and interpreted by me): Normal sinus rhythm. No acute ischemia. Heart rate 87 with no acute ST segment elevation or depression noted    HEART SCORE     Patient history  1      Moderately suspicious (1 point)    2   ECG       Nonspecific repolarization disturbance (1 point)    3   Patient age       Between 45 and 65 (1 point)    4   Risk factors (Hypercholesterolemia,  Hypertension, diabetes, smoking, obesity)     More than 3 risk factors or atherosclerosis history (2 points)     Troponin       Less than normal limit (0 points)     6     TOTAL RISK NUMBER: 5    The three risk categories are described below:  Heart score MACE risk Recommendation  0 - 3 Low (1.7%) Discharge can be an option.  4 - 6 Intermediate (20.3%) Clinical observation and further investigations.  7 - 10 High (72.2%) Immediate invasive treatment        COURSE & MEDICAL DECISION MAKING       Patient's partial differential diagnosis can include:    Unstable angina, angina, non-STEMI, arrhythmia, pneumothorax, pulmonary embolism, electrolyte abnormality,  Prinzmetal angina, costochondritis pleurisy, pleural effusion, pulmonary edema, panic attack, esophageal spasm, GERD, gastritis, chest spasms, and others      CT of the chest is negative.  Troponin negative x 2.  Pain improved with fentanyl.  Patient shows signs of pleurisy ischemia.  There is no dissection.  No pulmonary emboli.  Patient will be discharged home.    Patient's level of risk: Moderate        CRITICAL CARE    CRITICAL CARE: No  CRITICAL CARE TIME: None      Recent Results (from the past 24 hours)   ECG 12 Lead Chest Pain    Collection Time: 12/23/24 11:19 AM   Result Value Ref Range    QT Interval 348 ms    QTC Interval 418 ms   Comprehensive Metabolic Panel    Collection Time: 12/23/24 12:51 PM    Specimen: Blood   Result Value Ref Range    Glucose 110 (H) 65 - 99 mg/dL    BUN 10 6 - 20 mg/dL    Creatinine 0.75 0.57 - 1.00 mg/dL    Sodium 136 136 - 145 mmol/L    Potassium 3.4 (L) 3.5 - 5.2 mmol/L    Chloride 101 98 - 107 mmol/L    CO2 25.0 22.0 - 29.0 mmol/L    Calcium 9.3 8.6 - 10.5 mg/dL    Total Protein 7.4 6.0 - 8.5 g/dL    Albumin 4.1 3.5 - 5.2 g/dL    ALT (SGPT) 11 1 - 33 U/L    AST (SGOT) 15 1 - 32 U/L    Alkaline Phosphatase 131 (H) 39 - 117 U/L    Total Bilirubin 0.2 0.0 - 1.2 mg/dL    Globulin 3.3 gm/dL    A/G Ratio 1.2 g/dL     BUN/Creatinine Ratio 13.3 7.0 - 25.0    Anion Gap 10.0 5.0 - 15.0 mmol/L    eGFR 93.0 >60.0 mL/min/1.73   Protime-INR    Collection Time: 12/23/24 12:51 PM    Specimen: Blood   Result Value Ref Range    Protime 12.3 11.8 - 14.8 Seconds    INR 0.88 (L) 0.91 - 1.09   aPTT    Collection Time: 12/23/24 12:51 PM    Specimen: Blood   Result Value Ref Range    PTT 25.2 24.5 - 36.0 seconds   High Sensitivity Troponin T    Collection Time: 12/23/24 12:51 PM    Specimen: Blood   Result Value Ref Range    HS Troponin T 6 <14 ng/L   BNP    Collection Time: 12/23/24 12:51 PM    Specimen: Blood   Result Value Ref Range    proBNP 241.0 0.0 - 900.0 pg/mL   Lactic Acid, Plasma    Collection Time: 12/23/24 12:51 PM    Specimen: Blood   Result Value Ref Range    Lactate 1.1 0.5 - 2.0 mmol/L   Magnesium    Collection Time: 12/23/24 12:51 PM    Specimen: Blood   Result Value Ref Range    Magnesium 1.9 1.6 - 2.6 mg/dL   CBC Auto Differential    Collection Time: 12/23/24 12:51 PM    Specimen: Blood   Result Value Ref Range    WBC 11.26 (H) 3.40 - 10.80 10*3/mm3    RBC 4.06 3.77 - 5.28 10*6/mm3    Hemoglobin 9.5 (L) 12.0 - 15.9 g/dL    Hematocrit 32.0 (L) 34.0 - 46.6 %    MCV 78.8 (L) 79.0 - 97.0 fL    MCH 23.4 (L) 26.6 - 33.0 pg    MCHC 29.7 (L) 31.5 - 35.7 g/dL    RDW 17.2 (H) 12.3 - 15.4 %    RDW-SD 49.2 37.0 - 54.0 fl    MPV 10.5 6.0 - 12.0 fL    Platelets 371 140 - 450 10*3/mm3    Neutrophil % 48.3 42.7 - 76.0 %    Lymphocyte % 36.7 19.6 - 45.3 %    Monocyte % 11.3 5.0 - 12.0 %    Eosinophil % 2.9 0.3 - 6.2 %    Basophil % 0.5 0.0 - 1.5 %    Immature Grans % 0.3 0.0 - 0.5 %    Neutrophils, Absolute 5.44 1.70 - 7.00 10*3/mm3    Lymphocytes, Absolute 4.13 (H) 0.70 - 3.10 10*3/mm3    Monocytes, Absolute 1.27 (H) 0.10 - 0.90 10*3/mm3    Eosinophils, Absolute 0.33 0.00 - 0.40 10*3/mm3    Basophils, Absolute 0.06 0.00 - 0.20 10*3/mm3    Immature Grans, Absolute 0.03 0.00 - 0.05 10*3/mm3    nRBC 0.0 0.0 - 0.2 /100 WBC   [place] Urine -  Urine, Clean Catch    Collection Time: 12/23/24 12:53 PM    Specimen: Urine, Clean Catch   Result Value Ref Range    Extra Tube Hold for add-ons.    High Sensitivity Troponin T 1Hr    Collection Time: 12/23/24  2:21 PM    Specimen: Blood   Result Value Ref Range    HS Troponin T <6 <14 ng/L    Troponin T Numeric Delta     ECG 12 Lead Chest Pain    Collection Time: 12/23/24  2:30 PM   Result Value Ref Range    QT Interval 400 ms    QTC Interval 450 ms         Old charts were reviewed per [facility] EMR.  Pertinent details are summarized above.  All laboratory, radiologic, and EKG studies that were performed in the Emergency Department were a necessary part of the evaluation needed to exclude unstable or  emergent medical conditions.     Patient was hemodynamically and neurologically stable in the ED.   Pertinent studies were reviewed as above.     The patient received:  Medications   sodium chloride 0.9 % flush 10 mL (has no administration in time range)   fentaNYL citrate (PF) (SUBLIMAZE) injection 25 mcg (25 mcg Intravenous Given 12/23/24 1259)   ondansetron (ZOFRAN) injection 4 mg (4 mg Intravenous Given 12/23/24 1259)   iopamidol (ISOVUE-370) 76 % injection 100 mL (75 mL Intravenous Given 12/23/24 1338)            ED Disposition       ED Disposition   Discharge    Condition   Stable    Comment   --                 Dragon disclaimer:  Part of this note may be an electronic transcription/translation of spoken language to printed text using the Dragon Dictation System.    I have reviewed the patient’s prescription history via a prescription monitoring program.  This information is consistent with my knowledge of the patient’s controlled substance use history.    Patient evaluated during Coronavirus Pandemic. Isolation practices followed according to [facility] policy.     FINAL IMPRESSION   Diagnosis Plan   1. Pleuritic chest pain        2. Thoracic back pain, unspecified back pain laterality, unspecified chronicity         3. Hypokalemia        4. Leukocytosis, unspecified type              MD Flynn Phelps Jr, Thomas Mark Jr., MD  12/23/24 8483

## 2024-12-24 LAB
BEAKER LAB AP INTRAOPERATIVE CONSULTATION: NORMAL
CYTO UR: NORMAL
LAB AP CASE REPORT: NORMAL
LAB AP CLINICAL INFORMATION: NORMAL
LAB AP DIAGNOSIS COMMENT: NORMAL
Lab: NORMAL
PATH REPORT.FINAL DX SPEC: NORMAL
PATH REPORT.GROSS SPEC: NORMAL
QT INTERVAL: 348 MS
QT INTERVAL: 400 MS
QTC INTERVAL: 418 MS
QTC INTERVAL: 450 MS

## 2024-12-28 NOTE — PROGRESS NOTES
Subjective:     Encounter Date:12/19/2024      Patient ID: Alannah Barr is a 59 y.o. female.    Chief Complaint:  History of Present Illness  History of Present Illness  The patient presents for evaluation of fluid retention, chest pain, and axillary mass.    She has been experiencing significant fluid retention, which she attributes to the ineffectiveness of her Lasix medication. Despite self-increasing the dosage from 40 mg to 80 mg daily, there was no improvement. She sought help from her primary care physician, who prescribed Bumex 2 mg, but she ran out of the medication two days before Thanksgiving and did not take it for a week. This led to a severe fluid overload, necessitating hospitalization. Her BNP levels increased from 200 to 700, and then to over 1100. During her hospital stay, she was administered 40 mL of Lasix. Her weight, which was 159 pounds without shoes and in pajamas last night, has increased to 166 pounds today with shoes and clothing on. She took her last dose of diuretic yesterday and is due for today's dose. She reports increased urination with Bumex compared to Lasix. She also reports difficulty walking due to shortness of breath when she had fluid overload.    She describes experiencing sharp chest pain that radiates to her back and shoulder, different from the pain she felt when she had stents placed last year and earlier this year. She is uncertain if this pain is cardiac-related. She has reduced her smoking to five cigarettes per day.    She has an appointment with Dr. Cespedes on 01/06/2025 due to a mass in her axilla. They plan to take her off Plavix for 5 days to perform a biopsy. She has nodules on her lungs and there is a possibility of cancer. A mammogram did not show any abnormalities in her breasts, but there were findings in the lymph nodes in the armpit.    She is currently on baby aspirin, Plavix, atorvastatin, Coreg 6.25 mg twice daily, isosorbide 30 mg,  Ranexa 500 mg twice daily, Entresto, spironolactone, and potassium 10 mcg. She has been advised to take Bumex 2 mg twice daily. She has been cutting her spironolactone in half and does not take it when her blood pressure is low. She has experienced episodes of hypotension at work, with blood pressure readings as low as 70/40, causing her legs to feel weak. She was advised to increase her fluid intake during these episodes.    SOCIAL HISTORY  The patient admits to smoking 5 cigarettes most days.    MEDICATIONS  Current: Lasix, Bumex, baby aspirin, Plavix, atorvastatin, Coreg, isosorbide, Ranexa, Entresto, spironolactone, potassium        Review of Systems   Constitutional: Negative for diaphoresis, fever and malaise/fatigue.   HENT:  Negative for congestion.    Eyes:  Negative for vision loss in left eye and vision loss in right eye.   Cardiovascular:  Positive for chest pain and leg swelling. Negative for claudication, dyspnea on exertion, irregular heartbeat, orthopnea, palpitations and syncope.   Respiratory:  Negative for cough, shortness of breath and wheezing.    Hematologic/Lymphatic: Negative for adenopathy.   Skin:  Negative for rash.   Musculoskeletal:  Negative for joint pain and joint swelling.   Gastrointestinal:  Negative for abdominal pain, diarrhea, nausea and vomiting.   Neurological:  Negative for excessive daytime sleepiness, dizziness, focal weakness, light-headedness, numbness and weakness.   Psychiatric/Behavioral:  Negative for depression. The patient does not have insomnia.            Current Outpatient Medications:     albuterol (PROVENTIL) (2.5 MG/3ML) 0.083% nebulizer solution, Take 2.5 mg by nebulization Every 4 (Four) Hours As Needed for Wheezing., Disp: 25 each, Rfl: 0    albuterol sulfate  (90 Base) MCG/ACT inhaler, Inhale 2 puffs Every 4 (Four) Hours As Needed for Wheezing., Disp: , Rfl:     aspirin 81 MG EC tablet, Take 1 tablet by mouth Daily., Disp: 30 tablet, Rfl: 0     atorvastatin (LIPITOR) 80 MG tablet, Take 1 tablet by mouth Every Night., Disp: 90 tablet, Rfl: 3    bumetanide (BUMEX) 2 MG tablet, Take 1 tablet by mouth 2 (Two) Times a Day., Disp: 60 tablet, Rfl: 0    carvedilol (COREG) 6.25 MG tablet, Take 1 tablet by mouth 2 (Two) Times a Day With Meals., Disp: 180 tablet, Rfl: 3    clopidogrel (PLAVIX) 75 MG tablet, Take 1 tablet by mouth Daily., Disp: 90 tablet, Rfl: 3    FLUoxetine (PROzac) 20 MG capsule, Take 2 capsules by mouth Daily., Disp: , Rfl:     gabapentin (NEURONTIN) 600 MG tablet, Take 1 tablet by mouth 3 (Three) Times a Day., Disp: , Rfl:     HYDROcodone-acetaminophen (NORCO)  MG per tablet, Take 1 tablet by mouth 4 (Four) Times a Day., Disp: , Rfl:     hydrOXYzine pamoate (VISTARIL) 25 MG capsule, Take 1 capsule by mouth 3 (Three) Times a Day As Needed for Itching or Anxiety., Disp: , Rfl:     isosorbide mononitrate (IMDUR) 30 MG 24 hr tablet, Take 1 tablet by mouth Daily., Disp: 90 tablet, Rfl: 3    nitroglycerin (Nitrostat) 0.4 MG SL tablet, Place 1 tablet under the tongue Every 5 (Five) Minutes As Needed for Chest Pain. Take no more than 3 doses in 15 minutes., Disp: 50 tablet, Rfl: 0    pantoprazole (PROTONIX) 40 MG EC tablet, Take 1 tablet by mouth 2 (Two) Times a Day., Disp: , Rfl:     potassium chloride 10 MEQ CR tablet, Take 2 tablets by mouth Daily., Disp: 60 tablet, Rfl: 0    ranolazine (RANEXA) 500 MG 12 hr tablet, Take 1 tablet by mouth 2 (Two) Times a Day., Disp: 180 tablet, Rfl: 3    sacubitril-valsartan (Entresto) 24-26 MG tablet, Take 1 tablet by mouth 2 (Two) Times a Day., Disp: 180 tablet, Rfl: 3    spironolactone (ALDACTONE) 25 MG tablet, Take 0.5 tablets by mouth Every Other Day., Disp: 45 tablet, Rfl: 3    topiramate (TOPAMAX) 50 MG tablet, Take 1 tablet by mouth Daily., Disp: , Rfl:     zolpidem (AMBIEN) 10 MG tablet, Take 1 tablet by mouth At Night As Needed for Sleep., Disp: , Rfl:     methylPREDNISolone (MEDROL) 4 MG dose pack,  Take as directed on package instructions., Disp: 21 each, Rfl: 0    tiZANidine (ZANAFLEX) 4 MG tablet, Take 1 tablet by mouth 2 (Two) Times a Day As Needed for Muscle Spasms for up to 6 days., Disp: 12 tablet, Rfl: 0    Current Facility-Administered Medications:     lidocaine (XYLOCAINE) 1 % injection 5 mL, 5 mL, Subcutaneous, Once, Vazquez Finch MD       Objective:      Vitals:    12/19/24 0904   BP: 124/72   Pulse: 78     Vitals and nursing note reviewed.   Constitutional:       Appearance: Normal and healthy appearance. Well-developed and not in distress.   Eyes:      Extraocular Movements: Extraocular movements intact.      Pupils: Pupils are equal, round, and reactive to light.   HENT:      Head: Normocephalic and atraumatic.    Mouth/Throat:      Pharynx: Oropharynx is clear.   Neck:      Vascular: JVD normal.      Trachea: Trachea normal.   Pulmonary:      Effort: Pulmonary effort is normal.      Breath sounds: Normal breath sounds. No wheezing. No rhonchi. No rales.   Cardiovascular:      PMI at left midclavicular line. Normal rate. Regular rhythm. Normal S1. Normal S2.       Murmurs: There is a grade 2/6 systolic murmur.      No gallop.  No click. No rub.   Pulses:     Dorsalis pedis: 2+ bilaterally.     Posterior tibial: 2+ bilaterally.  Abdominal:      General: Bowel sounds are normal.      Palpations: Abdomen is soft.      Tenderness: There is no abdominal tenderness.   Musculoskeletal: Normal range of motion.      Cervical back: Normal range of motion and neck supple. Skin:     General: Skin is warm and dry.      Capillary Refill: Capillary refill takes less than 2 seconds.   Feet:      Right foot:      Skin integrity: Skin integrity normal.      Left foot:      Skin integrity: Skin integrity normal.   Neurological:      Mental Status: Alert and oriented to person, place and time.      Sensory: Sensation is intact.      Motor: Motor function is intact.      Coordination: Coordination is intact.    Psychiatric:         Speech: Speech normal.         Behavior: Behavior is cooperative.       Physical Exam  Vital Signs  Weight is 166 pounds.    Lab Review:       Procedures  Results  Laboratory Studies  BNP was 700, then increased to 1100.    Assessment/Plan:     Problem List Items Addressed This Visit       COPD (chronic obstructive pulmonary disease)    CAD (coronary artery disease) - Primary    Relevant Medications    carvedilol (COREG) 6.25 MG tablet    clopidogrel (PLAVIX) 75 MG tablet    isosorbide mononitrate (IMDUR) 30 MG 24 hr tablet    ranolazine (RANEXA) 500 MG 12 hr tablet    sacubitril-valsartan (Entresto) 24-26 MG tablet    Chronic diastolic CHF (congestive heart failure)    Relevant Medications    carvedilol (COREG) 6.25 MG tablet    clopidogrel (PLAVIX) 75 MG tablet    isosorbide mononitrate (IMDUR) 30 MG 24 hr tablet    ranolazine (RANEXA) 500 MG 12 hr tablet    sacubitril-valsartan (Entresto) 24-26 MG tablet     Assessment & Plan  1. Fluid retention.  She reports that Lasix was ineffective even after doubling the dose. She was switched to Bumex 2 mg, which improved her symptoms. However, she experienced fluid overload after missing doses. Her BNP levels increased significantly, indicating worsening heart failure. She will be prescribed Bumex 2 mg, to be taken once in the morning and once at night until her weight normalizes and swelling subsides. A prescription for 90 days with three refills will be provided. Additionally, her potassium dosage will be increased to 20 mcg daily, and a refill will be called into her mail-order pharmacy. A short supply of Bumex will be called into her local pharmacy for immediate use.    2. Chest pain.  She reports sharp chest pain that is different from her previous cardiac-related pain. The pain is likely pulmonary rather than cardiac, given its sharp nature. She is advised to monitor her symptoms and report any changes.    3. Axillary mass.  She has a mass in  her axilla and is scheduled to see a specialist in January. The radiologist prefers holding Plavix before the biopsy, but it is not recommended to stop Plavix until at least February due to recent stent placements. She will continue her Plavix and aspirin regimen.    4. Medication management.  Refills for all her medications, including atorvastatin, Coreg, isosorbide, Ranexa, Entresto, and spironolactone, will be provided. She is advised to continue taking Entresto and Coreg daily. She can take spironolactone as tolerated.    PROCEDURE  The patient had stents placed last year and earlier this year.      Recommendations/plans:    Transcribed from ambient dictation for Osman Patrick DO by Osman Patrick DO.  12/28/24   17:42 CST    Patient or patient representative verbalized consent for the use of Ambient Listening during the visit with  Osman Patrick DO for chart documentation. 12/28/2024  17:45 CST

## 2024-12-30 ENCOUNTER — TELEPHONE (OUTPATIENT)
Dept: INTERVENTIONAL RADIOLOGY/VASCULAR | Facility: HOSPITAL | Age: 59
End: 2024-12-30
Payer: COMMERCIAL

## 2025-01-16 ENCOUNTER — TELEPHONE (OUTPATIENT)
Dept: PULMONOLOGY | Facility: CLINIC | Age: 60
End: 2025-01-16
Payer: COMMERCIAL

## 2025-01-16 NOTE — TELEPHONE ENCOUNTER
Called patient to reschedule their no show appointment that was originally scheduled on 01/06/2025 .      Unable to contact patient after trying all available phone numbers. No Show letter was mailed, which includes [facility] No Show Policy.     Referring provider Gregory acosta also notified by voicemail to referral coordinator.

## 2025-01-27 ENCOUNTER — HOSPITAL ENCOUNTER (EMERGENCY)
Facility: HOSPITAL | Age: 60
Discharge: HOME | End: 2025-01-27
Attending: EMERGENCY MEDICINE | Admitting: EMERGENCY MEDICINE
Payer: COMMERCIAL

## 2025-01-27 ENCOUNTER — APPOINTMENT (OUTPATIENT)
Dept: CT IMAGING | Facility: HOSPITAL | Age: 60
End: 2025-01-27
Payer: COMMERCIAL

## 2025-01-27 VITALS
RESPIRATION RATE: 18 BRPM | OXYGEN SATURATION: 97 % | DIASTOLIC BLOOD PRESSURE: 87 MMHG | TEMPERATURE: 98.2 F | HEIGHT: 60 IN | SYSTOLIC BLOOD PRESSURE: 117 MMHG | BODY MASS INDEX: 35.47 KG/M2 | WEIGHT: 180.7 LBS | HEART RATE: 89 BPM

## 2025-01-27 DIAGNOSIS — N10 ACUTE PYELONEPHRITIS: Primary | ICD-10-CM

## 2025-01-27 LAB
ALBUMIN SERPL-MCNC: 3.8 G/DL (ref 3.5–5.2)
ALBUMIN/GLOB SERPL: 1.1 G/DL
ALP SERPL-CCNC: 127 U/L (ref 39–117)
ALT SERPL W P-5'-P-CCNC: 11 U/L (ref 1–33)
ANION GAP SERPL CALCULATED.3IONS-SCNC: 11 MMOL/L (ref 5–15)
AST SERPL-CCNC: 13 U/L (ref 1–32)
BACTERIA UR QL AUTO: ABNORMAL /HPF
BASOPHILS # BLD AUTO: 0.04 10*3/MM3 (ref 0–0.2)
BASOPHILS NFR BLD AUTO: 0.4 % (ref 0–1.5)
BILIRUB SERPL-MCNC: 0.2 MG/DL (ref 0–1.2)
BILIRUB UR QL STRIP: ABNORMAL
BUN SERPL-MCNC: 13 MG/DL (ref 6–20)
BUN/CREAT SERPL: 12.4 (ref 7–25)
CALCIUM SPEC-SCNC: 8.9 MG/DL (ref 8.6–10.5)
CHLORIDE SERPL-SCNC: 101 MMOL/L (ref 98–107)
CLARITY UR: ABNORMAL
CO2 SERPL-SCNC: 23 MMOL/L (ref 22–29)
COLOR UR: ABNORMAL
CREAT SERPL-MCNC: 1.05 MG/DL (ref 0.57–1)
DEPRECATED RDW RBC AUTO: 48.9 FL (ref 37–54)
EGFRCR SERPLBLD CKD-EPI 2021: 62.1 ML/MIN/1.73
EOSINOPHIL # BLD AUTO: 0.37 10*3/MM3 (ref 0–0.4)
EOSINOPHIL NFR BLD AUTO: 3.3 % (ref 0.3–6.2)
ERYTHROCYTE [DISTWIDTH] IN BLOOD BY AUTOMATED COUNT: 17.7 % (ref 12.3–15.4)
GLOBULIN UR ELPH-MCNC: 3.4 GM/DL
GLUCOSE SERPL-MCNC: 152 MG/DL (ref 65–99)
GLUCOSE UR STRIP-MCNC: NEGATIVE MG/DL
HCT VFR BLD AUTO: 30.7 % (ref 34–46.6)
HGB BLD-MCNC: 8.9 G/DL (ref 12–15.9)
HGB UR QL STRIP.AUTO: NEGATIVE
HYALINE CASTS UR QL AUTO: ABNORMAL /LPF
IMM GRANULOCYTES # BLD AUTO: 0.03 10*3/MM3 (ref 0–0.05)
IMM GRANULOCYTES NFR BLD AUTO: 0.3 % (ref 0–0.5)
KETONES UR QL STRIP: NEGATIVE
LEUKOCYTE ESTERASE UR QL STRIP.AUTO: ABNORMAL
LIPASE SERPL-CCNC: 18 U/L (ref 13–60)
LYMPHOCYTES # BLD AUTO: 4.03 10*3/MM3 (ref 0.7–3.1)
LYMPHOCYTES NFR BLD AUTO: 35.6 % (ref 19.6–45.3)
MCH RBC QN AUTO: 22.3 PG (ref 26.6–33)
MCHC RBC AUTO-ENTMCNC: 29 G/DL (ref 31.5–35.7)
MCV RBC AUTO: 76.8 FL (ref 79–97)
MONOCYTES # BLD AUTO: 1.21 10*3/MM3 (ref 0.1–0.9)
MONOCYTES NFR BLD AUTO: 10.7 % (ref 5–12)
NEUTROPHILS NFR BLD AUTO: 49.7 % (ref 42.7–76)
NEUTROPHILS NFR BLD AUTO: 5.65 10*3/MM3 (ref 1.7–7)
NITRITE UR QL STRIP: POSITIVE
NRBC BLD AUTO-RTO: 0 /100 WBC (ref 0–0.2)
PH UR STRIP.AUTO: <=5 [PH] (ref 5–8)
PLATELET # BLD AUTO: 409 10*3/MM3 (ref 140–450)
PMV BLD AUTO: 9.6 FL (ref 6–12)
POTASSIUM SERPL-SCNC: 3.8 MMOL/L (ref 3.5–5.2)
PROT SERPL-MCNC: 7.2 G/DL (ref 6–8.5)
PROT UR QL STRIP: ABNORMAL
RBC # BLD AUTO: 4 10*6/MM3 (ref 3.77–5.28)
RBC # UR STRIP: ABNORMAL /HPF
REF LAB TEST METHOD: ABNORMAL
SODIUM SERPL-SCNC: 135 MMOL/L (ref 136–145)
SP GR UR STRIP: 1.01 (ref 1–1.03)
SQUAMOUS #/AREA URNS HPF: ABNORMAL /HPF
UROBILINOGEN UR QL STRIP: ABNORMAL
WBC # UR STRIP: ABNORMAL /HPF
WBC NRBC COR # BLD AUTO: 11.33 10*3/MM3 (ref 3.4–10.8)

## 2025-01-27 PROCEDURE — 25810000003 SODIUM CHLORIDE 0.9 % SOLUTION: Performed by: EMERGENCY MEDICINE

## 2025-01-27 PROCEDURE — 25510000001 IOPAMIDOL 61 % SOLUTION: Performed by: EMERGENCY MEDICINE

## 2025-01-27 PROCEDURE — 85025 COMPLETE CBC W/AUTO DIFF WBC: CPT | Performed by: EMERGENCY MEDICINE

## 2025-01-27 PROCEDURE — 25010000002 CEFTRIAXONE PER 250 MG: Performed by: EMERGENCY MEDICINE

## 2025-01-27 PROCEDURE — 83690 ASSAY OF LIPASE: CPT | Performed by: EMERGENCY MEDICINE

## 2025-01-27 PROCEDURE — 80053 COMPREHEN METABOLIC PANEL: CPT | Performed by: EMERGENCY MEDICINE

## 2025-01-27 PROCEDURE — 74177 CT ABD & PELVIS W/CONTRAST: CPT

## 2025-01-27 PROCEDURE — 36415 COLL VENOUS BLD VENIPUNCTURE: CPT

## 2025-01-27 PROCEDURE — 96365 THER/PROPH/DIAG IV INF INIT: CPT

## 2025-01-27 PROCEDURE — 99285 EMERGENCY DEPT VISIT HI MDM: CPT

## 2025-01-27 PROCEDURE — 81001 URINALYSIS AUTO W/SCOPE: CPT | Performed by: EMERGENCY MEDICINE

## 2025-01-27 RX ORDER — CEFDINIR 300 MG/1
300 CAPSULE ORAL 2 TIMES DAILY
Qty: 20 CAPSULE | Refills: 0 | Status: SHIPPED | OUTPATIENT
Start: 2025-01-27 | End: 2025-02-06

## 2025-01-27 RX ORDER — IOPAMIDOL 612 MG/ML
100 INJECTION, SOLUTION INTRAVASCULAR
Status: COMPLETED | OUTPATIENT
Start: 2025-01-27 | End: 2025-01-27

## 2025-01-27 RX ADMIN — SODIUM CHLORIDE 1000 ML: 9 INJECTION, SOLUTION INTRAVENOUS at 04:52

## 2025-01-27 RX ADMIN — IOPAMIDOL 100 ML: 612 INJECTION, SOLUTION INTRAVENOUS at 05:23

## 2025-01-27 RX ADMIN — CEFTRIAXONE SODIUM 2000 MG: 2 INJECTION, POWDER, FOR SOLUTION INTRAMUSCULAR; INTRAVENOUS at 05:58

## 2025-01-27 NOTE — Clinical Note
[facility] EMERGENCY DEPARTMENT  [address] [place] [address] AVE  [facility] [address]  Phone: [phone]    Alannah Barr was seen and treated in our emergency department on 1/27/2025.  She may return to work on 01/28/2025.         Thank you for choosing [facility].    Gatito Mcfarlane, DO

## 2025-01-27 NOTE — PROGRESS NOTES
"Pharmacy Dosing Service  Antimicrobial Dosing  Rocephin    Assessment/Action/Plan:  Rocephin 1 gm IV once for the treatment of UTI has been adjusted to 2 gm IV once for patient with a BMI >= 30 kg/m2.        Subjective:  Alannah Barr is a 59 y.o. female   Objective:  Ht: 152.4 cm (60\"); Wt: 82 kg (180 lb 11.2 oz); BMI: Body mass index is 35.29 kg/m².  Estimated Creatinine Clearance: 56.1 mL/min (A) (by C-G formula based on SCr of 1.05 mg/dL (H)).   Creatinine   Date Value Ref Range Status   01/27/2025 1.05 (H) 0.57 - 1.00 mg/dL Final      Lab Results   Component Value Date    WBC 11.33 (H) 01/27/2025      Baseline culture results:  Microbiology Results (last 10 days)       ** No results found for the last 240 hours. **            Terry Solis, PharmD  01/27/25 05:52 CST    "

## 2025-01-27 NOTE — ED NOTES
"Patient took Bumex from her purse after triage was completed, stating she's \"gained 30 pounds in fluid\" because the weight that was taken with the bed scale was more than what she has seen on her home scale. Patient educated on the fluctuation of scales and that not all scales will be the exact same.   "

## 2025-01-27 NOTE — ED PROVIDER NOTES
Subjective   History of Present Illness  Pt presents to the  with report of RLQ abdominal pain - onset 3d ago per report.  States worsened tonight.  Denies f/c.  No n/v/d.  No injuries.  Denies any hematuria/dysuria.  No new foods/meds.  Pain worse with movement.          Review of Systems   Constitutional:  Negative for chills and fever.   Respiratory:  Negative for shortness of breath.    Gastrointestinal:  Positive for abdominal pain. Negative for diarrhea, nausea and vomiting.   Genitourinary:  Negative for dysuria, flank pain and hematuria.   All other systems reviewed and are negative.      Past Medical History:   Diagnosis Date    CHF (congestive heart failure)     Chronic pain syndrome     COPD (chronic obstructive pulmonary disease)     Tobacco dependence        Allergies   Allergen Reactions    Levaquin [Levofloxacin] Shortness Of Breath    Morphine Shortness Of Breath    Codeine Unknown - High Severity    Doxycycline Unknown - High Severity    Metronidazole Unknown - High Severity    Naproxen Unknown - High Severity    Propoxyphene Unknown - High Severity    Tetanus Toxoids Unknown - High Severity    Tetracyclines & Related Unknown - High Severity       Past Surgical History:   Procedure Laterality Date    BREAST BIOPSY Right     CARDIAC CATHETERIZATION N/A 07/29/2023    Procedure: Left Heart Cath;  Surgeon: Osman Patrick DO;  Location:  [facility] CATH INVASIVE LOCATION;  Service: Cardiovascular;  Laterality: N/A;    CARDIAC CATHETERIZATION N/A 08/17/2023    Procedure: Percutaneous Coronary Intervention;  Surgeon: Osman Patrick DO;  Location:  [facility] CATH INVASIVE LOCATION;  Service: Cardiovascular;  Laterality: N/A;    CARDIAC CATHETERIZATION N/A 4/27/2024    Procedure: Left Heart Cath, coronaries, grafts, possible;  Surgeon: Win Ann MD;  Location:  [facility] CATH INVASIVE LOC[facility];  Service: Cardiology;  Laterality: N/A;    CARDIAC CATHETERIZATION N/A 8/27/2024     Procedure: Left Heart Cath;  Surgeon: Osman Patrick DO;  Location:  [facility] CATH INVASIVE LOC[facility];  Service: Cardiovascular;  Laterality: N/A;    CHOLECYSTECTOMY      CORONARY STENT PLACEMENT  2023    X 2    HYSTERECTOMY      KNEE ARTHROSCOPY W/ MEDIAL COLLATERAL LIGAMENT (MCL) REPAIR Right     x 2    TONSILLECTOMY      US GUIDED LYMPH NODE BIOPSY  12/23/2024       Family History   Problem Relation Age of Onset    COPD Mother     Diabetes Father     Heart failure Father     BRCA 1/2 Neg Hx        Social History     Socioeconomic History    Marital status: Single   Tobacco Use    Smoking status: Every Day     Current packs/day: 1.00     Average packs/day: 1 pack/day for 51.0 years (51.0 ttl pk-yrs)     Types: Cigarettes    Smokeless tobacco: Never   Vaping Use    Vaping status: Never Used   Substance and Sexual Activity    Alcohol use: Not Currently    Drug use: Not Currently    Sexual activity: Defer           Objective   Physical Exam  Vitals and nursing note reviewed.   Constitutional:       General: She is not in acute distress.  HENT:      Head: Normocephalic and atraumatic.      Mouth/Throat:      Mouth: Mucous membranes are moist.   Eyes:      General: No scleral icterus.  Cardiovascular:      Rate and Rhythm: Normal rate and regular rhythm.      Heart sounds: Normal heart sounds.   Pulmonary:      Effort: Pulmonary effort is normal.      Breath sounds: Normal breath sounds.   Abdominal:      General: Abdomen is flat. Bowel sounds are normal.      Palpations: Abdomen is soft.      Tenderness: There is no abdominal tenderness. There is no guarding or rebound.   Skin:     General: Skin is warm and dry.      Capillary Refill: Capillary refill takes less than 2 seconds.   Neurological:      Mental Status: She is alert.         Procedures           ED Course      Labs Reviewed   COMPREHENSIVE METABOLIC PANEL - Abnormal; Notable for the following components:       Result Value    Glucose 152 (*)      Creatinine 1.05 (*)     Sodium 135 (*)     Alkaline Phosphatase 127 (*)     All other components within normal limits    Narrative:     GFR Categories in Chronic Kidney Disease (CKD)      GFR Category          GFR (mL/min/1.73)    Interpretation  G1                     90 or greater         Normal or high (1)  G2                      60-89                Mild decrease (1)  G3a                   45-59                Mild to moderate decrease  G3b                   30-44                Moderate to severe decrease  G4                    15-29                Severe decrease  G5                    14 or less           Kidney failure          (1)In the absence of evidence of kidney disease, neither GFR category G1 or G2 fulfill the criteria for CKD.    eGFR calculation 2021 CKD-EPI creatinine equation, which does not include race as a factor   URINALYSIS W/ MICROSCOPIC IF INDICATED (NO CULTURE) - Abnormal; Notable for the following components:    Color, UA Orange (*)     Appearance, UA Cloudy (*)     Bilirubin, UA Small (1+) (*)     Protein, UA Trace (*)     Leuk Esterase, UA Small (1+) (*)     Nitrite, UA Positive (*)     All other components within normal limits    Narrative:     Dipstick results may be inaccurate due to color interference.   CBC WITH AUTO DIFFERENTIAL - Abnormal; Notable for the following components:    WBC 11.33 (*)     Hemoglobin 8.9 (*)     Hematocrit 30.7 (*)     MCV 76.8 (*)     MCH 22.3 (*)     MCHC 29.0 (*)     RDW 17.7 (*)     Lymphocytes, Absolute 4.03 (*)     Monocytes, Absolute 1.21 (*)     All other components within normal limits   URINALYSIS, MICROSCOPIC ONLY - Abnormal; Notable for the following components:    WBC, UA 3-5 (*)     Bacteria, UA Trace (*)     All other components within normal limits   LIPASE - Normal   CBC AND DIFFERENTIAL    Narrative:     The following orders were created for panel order CBC & Differential.  Procedure                               Abnormality          Status                     ---------                               -----------         ------                     CBC Auto Differential[[id number]]        Abnormal            Final result                 Please view results for these tests on the individual orders.     CT Abdomen Pelvis With Contrast   Final Result   1. Right acute pyelonephritis.. No renal or ureteral calculi or   obstructive uropathy.   2. Normal appendix.   3. Prior cholecystectomy.   4. A small sliding-type hiatal hernia.                                                   This report was signed and finalized on 1/27/2025 5:44 AM by Dr. Lizandro Snider MD.                                                             Medical Decision Making  Pt stable in EC - no evid of appy/obstruction/perf.  No evid of sepsis.  + pyelonephritis.  Pt given dose of rocephin in EC and will give rx for omnicef.  Recommend prn apap/nsaids and outpt f/u.  Prec given    Amount and/or Complexity of Data Reviewed  Labs: ordered.  Radiology: ordered.    Risk  Prescription drug management.        Final diagnoses:   Acute pyelonephritis       ED Disposition  ED Disposition       ED Disposition   Discharge    Condition   Stable    Comment   --               Coy Guillermo MD  [address] [place] [address] [phone]               Medication List        New Prescriptions      cefdinir 300 MG capsule  Commonly known as: OMNICEF  Take 1 capsule by mouth 2 (Two) Times a Day for 10 days.               Where to Get Your Medications        These medications were sent to [facility] - [place], [place] - [address] [place] [address] - [phone]  - [phone] [address] [address]      Phone: [phone]   cefdinir 300 MG capsule            Gatito Mcfarlane, DO  01/27/25 5573       Gatito Mcfarlane, DO  01/27/25 1893

## 2025-02-23 RX ORDER — ATORVASTATIN CALCIUM 80 MG/1
80 TABLET, FILM COATED ORAL NIGHTLY
Qty: 90 TABLET | Refills: 3 | Status: SHIPPED | OUTPATIENT
Start: 2025-02-23

## 2025-02-23 RX ORDER — METHYLPREDNISOLONE 4 MG/1
TABLET ORAL
Qty: 21 EACH | Refills: 0 | Status: SHIPPED | OUTPATIENT
Start: 2025-02-23

## 2025-02-23 RX ORDER — CLOPIDOGREL BISULFATE 75 MG/1
75 TABLET ORAL DAILY
Qty: 90 TABLET | Refills: 3 | Status: SHIPPED | OUTPATIENT
Start: 2025-02-23

## 2025-02-23 RX ORDER — SACUBITRIL AND VALSARTAN 24; 26 MG/1; MG/1
1 TABLET, FILM COATED ORAL 2 TIMES DAILY
Qty: 180 TABLET | Refills: 3 | Status: SHIPPED | OUTPATIENT
Start: 2025-02-23

## 2025-02-23 RX ORDER — ISOSORBIDE MONONITRATE 30 MG/1
30 TABLET, EXTENDED RELEASE ORAL DAILY
Qty: 90 TABLET | Refills: 3 | Status: SHIPPED | OUTPATIENT
Start: 2025-02-23

## 2025-02-23 RX ORDER — BUMETANIDE 2 MG/1
2 TABLET ORAL 2 TIMES DAILY
Qty: 180 TABLET | Refills: 3 | Status: SHIPPED | OUTPATIENT
Start: 2025-02-23

## 2025-02-26 ENCOUNTER — TRANSCRIBE ORDERS (OUTPATIENT)
Dept: ADMINISTRATIVE | Age: 60
End: 2025-02-26

## 2025-02-26 DIAGNOSIS — M54.16 RADICULOPATHY, LUMBAR REGION: Primary | ICD-10-CM

## 2025-03-03 ENCOUNTER — OFFICE VISIT (OUTPATIENT)
Dept: PULMONOLOGY | Facility: CLINIC | Age: 60
End: 2025-03-03
Payer: COMMERCIAL

## 2025-03-03 ENCOUNTER — PATIENT ROUNDING (BHMG ONLY) (OUTPATIENT)
Dept: PULMONOLOGY | Facility: CLINIC | Age: 60
End: 2025-03-03
Payer: COMMERCIAL

## 2025-03-03 VITALS
OXYGEN SATURATION: 94 % | SYSTOLIC BLOOD PRESSURE: 122 MMHG | RESPIRATION RATE: 18 BRPM | HEIGHT: 60 IN | HEART RATE: 87 BPM | DIASTOLIC BLOOD PRESSURE: 80 MMHG | BODY MASS INDEX: 33.57 KG/M2 | WEIGHT: 171 LBS

## 2025-03-03 DIAGNOSIS — R06.09 CHRONIC DYSPNEA: ICD-10-CM

## 2025-03-03 DIAGNOSIS — R05.3 CHRONIC COUGH: ICD-10-CM

## 2025-03-03 DIAGNOSIS — Z23 NEED FOR VACCINATION: ICD-10-CM

## 2025-03-03 DIAGNOSIS — Z72.0 TOBACCO USE: ICD-10-CM

## 2025-03-03 DIAGNOSIS — J44.9 CHRONIC OBSTRUCTIVE PULMONARY DISEASE, UNSPECIFIED COPD TYPE: ICD-10-CM

## 2025-03-03 DIAGNOSIS — J84.115 RESPIRATORY BRONCHIOLITIS ASSOCIATED INTERSTITIAL LUNG DISEASE: Primary | ICD-10-CM

## 2025-03-03 DIAGNOSIS — R91.1 PULMONARY NODULE: ICD-10-CM

## 2025-03-03 RX ORDER — BUPROPION HYDROCHLORIDE 150 MG/1
150 TABLET, EXTENDED RELEASE ORAL 2 TIMES DAILY
Qty: 60 TABLET | Refills: 2 | Status: SHIPPED | OUTPATIENT
Start: 2025-03-03

## 2025-03-03 NOTE — PROGRESS NOTES
March 3, 2025    Hello, may I speak with Alannah Barr?    My name is JAE      I am  with [facility]  [address]  [facility] [address] [phone].    Before we get started may I verify your date of birth? 1965    I am calling to officially welcome you to our practice and ask about your recent visit. Is this a good time to talk? yes    Tell me about your visit with us. What things went well?  HAD A GOOD VISIT       We're always looking for ways to make our patients' experiences even better. Do you have recommendations on ways we may improve?  no    Overall were you satisfied with your first visit to our practice? yes       I appreciate you taking the time to speak with me today. Is there anything else I can do for you? no      Thank you, and have a great day.

## 2025-03-03 NOTE — LETTER
2025     Coy Guillermo MD  [address] [facility] [address]    Patient: Alannah Barr   YOB: 1965   Date of Visit: 3/3/2025     Dear Dr. Mima MD:    Thank you for referring Alannah Barr to me for evaluation. Below are the relevant portions of my assessment and plan of care.    If you have questions, please do not hesitate to call me. I look forward to following Alannah along with you.         Sincerely,        Suntana Patrick,         CC: No Recipients      Progress Notes:    Clinic Note - New Patient            NAME: Alannah Barr  MRN: [id number]  AGE: 59 y.o.            : 1965  PRIMARY CARE PROVIDER: Coy Guillermo MD               Reason for Visit:  Alannah Barr presents to clinic today as a new patient for the evaluation of pulmonary nodules.    History of Present Illness:  Mrs. Barr is a 59-year-old female who presents to clinic today as a new patient for evaluation of pulmonary nodules.  Past medical history includes tobacco use, COPD, asthma.    The patient was referred to pulmonology due to abnormal chest CT findings.  The patient was in the ED towards the end of December of last year.  She was complaining of chest and back pain.  She underwent a CT of the chest which showed diffuse micronodules.  Due to these findings she has been referred to pulmonology.  Denies any prior diagnosis of abnormal chest CT.  She does note difficulty with dyspnea on exertion.  States she will become short of breath while at work.  She also experiences dyspnea on exertion performing her ADLs.  Notes a chronic nonproductive cough.  Also notes occasional wheezing.  The patient is a current everyday smoker.  States that recently she was able to cut back, unfortunately she is back to 1 pack/day due to stress at home.  She is currently caring for her mother who is on hospice.    Admits to a diagnosis of COPD and asthma.  She has an  albuterol inhaler and a maintenance inhaler which she cannot recall the name.  Has never required supplemental oxygen at home.  No personal or family history of lung cancer or VTE.  She lives at home, she is concerned her home may have mold.    Review of Systems:  A full 12-point review of systems was performed and was negative except as documented in the HPI.               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  Pets: Dog, cat  Recent travel: None recent  Previous exposure to persons with tuberculosis: None known            Physical Exam:  General: No acute distress, cooperative.  Head: Atraumatic, normocephalic, normal inspection.  Eyes: EOMI, normal inspection.  ENT: Mucous membranes moist, normal inspection. No thrush.  Heart: RRR, no murmur  Lungs: Clear to auscultation, no wheezing  MSK: No edema or clubbing  GI/abdominal: Soft, nontender.  Neurologic: Alert, oriented.  Cranial nerves II through XII grossly intact.      Imaging Review:  I personally reviewed the chest CTA done 2024:  Chest CTA shows diffuse micronodules and some scattered groundglass opacities.      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 - Primary    Relevant Medications    Fluticasone-Umeclidin-Vilant (TRELEGY ELLIPTA) 200-62.5-25 MCG/ACT inhaler    Fluticasone-Umeclidin-Vilant (TRELEGY ELLIPTA) 200-62.5-25 MCG/ACT inhaler    Need for vaccination    Relevant Orders    Fluzone >6mos (Completed)    Pulmonary nodule    Relevant  Medications    Fluticasone-Umeclidin-Vilant (TRELEGY ELLIPTA) 200-62.5-25 MCG/ACT inhaler    Fluticasone-Umeclidin-Vilant (TRELEGY ELLIPTA) 200-62.5-25 MCG/ACT inhaler    Other Relevant Orders    Complete PFT - Pre & Post Bronchodilator    CT Chest Without Contrast    Chronic cough    Chronic dyspnea    Tobacco use         Pulmonary Assessment:  Patient is a 59-year-old female being evaluated for abnormal chest CT.  We reviewed her CT in clinic today.  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  We discussed tobacco cessation.  The patient states that currently it would be difficult to quit due to home stressors and taking care of her mother who is on hospice.  She is interested in starting Wellbutrin for tobacco cessation.  Also within the differential would be hypersensitivity pneumonitis, though there is very little mosaic attenuation and no 3 density sign.  Also within the differential would be infection though less likely.      Plan:   -Stressed the importance of tobacco cessation.  Wellbutrin provided.  Explained I would like to repeat her CT once she either cuts back or completely stops smoking to assess resolution of her micronodules which would be consistent with RB-ILD  -If her CT worsens or does not respond to tobacco cessation we will pursue additional workup for HP, infection, other etiology  -Given her significant dyspnea will increase her maintenance therapy to Trelegy  -PFTs prior to follow-up           Follow Up:  6-8 weeks         Thank you Coy Guillermo MD for this referral and allowing me to participate in this patient's care.           Past Medical History:   Past Medical History:   Diagnosis Date   • CHF (congestive heart failure)    • Chronic pain syndrome    • COPD (chronic obstructive pulmonary disease)    • Tobacco dependence          Past Surgical History:   Past Surgical History:   Procedure Laterality Date   • BREAST  BIOPSY Right    • CARDIAC CATHETERIZATION N/A 07/29/2023    Procedure: Left Heart Cath;  Surgeon: Osman Patrick DO;  Location:  [facility];  Service: Cardiovascular;  Laterality: N/A;   • CARDIAC CATHETERIZATION N/A 08/17/2023    Procedure: Percutaneous Coronary Intervention;  Surgeon: Osman Patrick DO;  Location:  [facility];  Service: Cardiovascular;  Laterality: N/A;   • CARDIAC CATHETERIZATION N/A 4/27/2024    Procedure: Left Heart Cath, coronaries, grafts, possible;  Surgeon: Win Ann MD;  Location:  [facility];  Service: Cardiology;  Laterality: N/A;   • CARDIAC CATHETERIZATION N/A 8/27/2024    Procedure: Left Heart Cath;  Surgeon: Osman Patrick DO;  Location:  [facility];  Service: Cardiovascular;  Laterality: N/A;   • CHOLECYSTECTOMY     • CORONARY STENT PLACEMENT  2023    X 2   • HYSTERECTOMY     • KNEE ARTHROSCOPY W/ MEDIAL COLLATERAL LIGAMENT (MCL) REPAIR Right     x 2   • TONSILLECTOMY     • US GUIDED LYMPH NODE BIOPSY  12/23/2024         Family History:   Family History   Problem Relation Age of Onset   • COPD Mother    • Diabetes Father    • Heart failure Father    • BRCA 1/2 Neg Hx          Medications:   Prior to Admission medications    Medication Sig Start Date End Date Taking? Authorizing Provider   albuterol (PROVENTIL) (2.5 MG/3ML) 0.083% nebulizer solution Take 2.5 mg by nebulization Every 4 (Four) Hours As Needed for Wheezing. 11/14/24  Yes Val Riggs APRN   albuterol sulfate  (90 Base) MCG/ACT inhaler Inhale 2 puffs Every 4 (Four) Hours As Needed for Wheezing.   Yes Provider, MD Zoraida   aspirin 81 MG EC tablet Take 1 tablet by mouth Daily. 8/28/24  Yes Osman Patrick DO   atorvastatin (LIPITOR) 80 MG tablet Take 1 tablet by mouth Every Night. 2/23/25  Yes Osman Patrick DO   bumetanide (BUMEX) 2 MG tablet Take 1 tablet by  mouth 2 (Two) Times a Day. 2/23/25  Yes Osman Patrick DO   carvedilol (COREG) 6.25 MG tablet Take 1 tablet by mouth 2 (Two) Times a Day With Meals. 12/19/24  Yes Osman Patrick DO   clopidogrel (PLAVIX) 75 MG tablet Take 1 tablet by mouth Daily. 2/23/25  Yes Osman Patrick DO   gabapentin (NEURONTIN) 600 MG tablet Take 1 tablet by mouth 3 (Three) Times a Day.   Yes Zoraida Avalos MD   HYDROcodone-acetaminophen (NORCO)  MG per tablet Take 1 tablet by mouth 4 (Four) Times a Day.   Yes Zoraida Avalos MD   hydrOXYzine pamoate (VISTARIL) 25 MG capsule Take 1 capsule by mouth 3 (Three) Times a Day As Needed for Itching or Anxiety.  Patient taking differently: Take 1 capsule by mouth As Needed for Itching or Anxiety. 9/18/24  Yes Zoraida Avalos MD   isosorbide mononitrate (IMDUR) 30 MG 24 hr tablet Take 1 tablet by mouth Daily. 2/23/25  Yes Osman Patrick DO   nitroglycerin (Nitrostat) 0.4 MG SL tablet Place 1 tablet under the tongue Every 5 (Five) Minutes As Needed for Chest Pain. Take no more than 3 doses in 15 minutes. 8/23/24  Yes Telly Pruett Jr., MD   pantoprazole (PROTONIX) 40 MG EC tablet Take 1 tablet by mouth 2 (Two) Times a Day.   Yes ProviderZoraida MD   potassium chloride 10 MEQ CR tablet Take 2 tablets by mouth Daily. 12/19/24  Yes Osman Patrick DO   ranolazine (RANEXA) 500 MG 12 hr tablet Take 1 tablet by mouth 2 (Two) Times a Day. 12/19/24  Yes Osman Patrick DO   sacubitril-valsartan (Entresto) 24-26 MG tablet Take 1 tablet by mouth 2 (Two) Times a Day. 2/23/25  Yes Osman Patrick DO   spironolactone (ALDACTONE) 25 MG tablet Take 0.5 tablets by mouth Every Other Day.  Patient taking differently: Take 0.5 tablets by mouth As Needed. 12/19/24  Yes Osman Patrick,    topiramate (TOPAMAX) 50 MG tablet Take 1 tablet by mouth Daily.   Yes Provider, MD Zoraida   zolpidem  "(AMBIEN) 10 MG tablet Take 1 tablet by mouth At Night As Needed for Sleep.   Yes Provider, MD Zoraida   buPROPion SR (Wellbutrin SR) 150 MG 12 hr tablet Take 1 tablet by mouth 2 (Two) Times a Day. 3/3/25   Ricky Patrick DO   FLUoxetine (PROzac) 20 MG capsule Take 2 capsules by mouth Daily.  Patient not taking: Reported on 3/3/2025    Provider, MD Zoraida   Fluticasone-Umeclidin-Vilant (TRELEGY ELLIPTA) 200-62.5-25 MCG/ACT inhaler Inhale 1 puff Daily. 3/3/25   Ricky Patrick DO   Fluticasone-Umeclidin-Vilant (TRELEGY ELLIPTA) 200-62.5-25 MCG/ACT inhaler Inhale 1 puff Daily. 3/3/25   Ricky Patrick DO   methylPREDNISolone (MEDROL) 4 MG dose pack Take as directed on package instructions.  Patient not taking: Reported on 3/3/2025 2/23/25   Osman Patrick DO         Allergies:   Levaquin [levofloxacin], Morphine, Codeine, Doxycycline, Metronidazole, Naproxen, Propoxyphene, Tetanus toxoids, and Tetracyclines & related      Results Review:             Visit Vitals  /80   Pulse 87   Resp 18   Ht 152.4 cm (60\")   Wt 77.6 kg (171 lb)   SpO2 94% Comment: r/a   Breastfeeding No   BMI 33.40 kg/m²                  Ricky Patrick DO  Pulmonary/Critical Care  3/5/2025  12:47 CST    "

## 2025-03-05 PROBLEM — Z72.0 TOBACCO USE: Status: ACTIVE | Noted: 2025-03-05

## 2025-03-05 PROBLEM — J84.115 RESPIRATORY BRONCHIOLITIS ASSOCIATED INTERSTITIAL LUNG DISEASE: Status: ACTIVE | Noted: 2025-03-05

## 2025-03-05 PROBLEM — R06.09 CHRONIC DYSPNEA: Status: ACTIVE | Noted: 2025-03-05

## 2025-03-05 PROBLEM — Z23 NEED FOR VACCINATION: Status: ACTIVE | Noted: 2025-03-05

## 2025-03-05 PROBLEM — R05.3 CHRONIC COUGH: Status: ACTIVE | Noted: 2025-03-05

## 2025-03-05 PROBLEM — R91.1 PULMONARY NODULE: Status: ACTIVE | Noted: 2025-03-05

## 2025-03-05 NOTE — PROGRESS NOTES
Clinic Note - New Patient            NAME: Alannah Barr  MRN: [id number]  AGE: 59 y.o.            : 1965  PRIMARY CARE PROVIDER: Coy Guillermo MD               Reason for Visit:  Alannah Barr presents to clinic today as a new patient for the evaluation of pulmonary nodules.    History of Present Illness:  Mrs. Barr is a 59-year-old female who presents to clinic today as a new patient for evaluation of pulmonary nodules.  Past medical history includes tobacco use, COPD, asthma.    The patient was referred to pulmonology due to abnormal chest CT findings.  The patient was in the ED towards the end of December of last year.  She was complaining of chest and back pain.  She underwent a CT of the chest which showed diffuse micronodules.  Due to these findings she has been referred to pulmonology.  Denies any prior diagnosis of abnormal chest CT.  She does note difficulty with dyspnea on exertion.  States she will become short of breath while at work.  She also experiences dyspnea on exertion performing her ADLs.  Notes a chronic nonproductive cough.  Also notes occasional wheezing.  The patient is a current everyday smoker.  States that recently she was able to cut back, unfortunately she is back to 1 pack/day due to stress at home.  She is currently caring for her mother who is on hospice.    Admits to a diagnosis of COPD and asthma.  She has an albuterol inhaler and a maintenance inhaler which she cannot recall the name.  Has never required supplemental oxygen at home.  No personal or family history of lung cancer or VTE.  She lives at home, she is concerned her home may have mold.    Review of Systems:  A full 12-point review of systems was performed and was negative except as documented in the HPI.               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  Pets: Dog, cat  Recent travel: None recent  Previous exposure to persons with tuberculosis: None known            Physical Exam:  General: No acute distress, cooperative.  Head: Atraumatic, normocephalic, normal inspection.  Eyes: EOMI, normal inspection.  ENT: Mucous membranes moist, normal inspection. No thrush.  Heart: RRR, no murmur  Lungs: Clear to auscultation, no wheezing  MSK: No edema or clubbing  GI/abdominal: Soft, nontender.  Neurologic: Alert, oriented.  Cranial nerves II through XII grossly intact.      Imaging Review:  I personally reviewed the chest CTA done 12/23/2024:  Chest CTA shows diffuse micronodules and some scattered groundglass opacities.      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 - Primary    Relevant Medications    Fluticasone-Umeclidin-Vilant (TRELEGY ELLIPTA) 200-62.5-25 MCG/ACT inhaler    Fluticasone-Umeclidin-Vilant (TRELEGY ELLIPTA) 200-62.5-25 MCG/ACT inhaler    Need for vaccination    Relevant Orders    Fluzone >6mos (Completed)    Pulmonary nodule    Relevant Medications    Fluticasone-Umeclidin-Vilant (TRELEGY ELLIPTA) 200-62.5-25 MCG/ACT inhaler    Fluticasone-Umeclidin-Vilant (TRELEGY ELLIPTA) 200-62.5-25 MCG/ACT inhaler    Other Relevant Orders    Complete PFT - Pre & Post Bronchodilator    CT Chest Without Contrast    Chronic cough    Chronic dyspnea    Tobacco use         Pulmonary Assessment:  Patient is a 59-year-old female being evaluated for abnormal chest CT.  We reviewed her CT in clinic today.  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  We discussed tobacco cessation.  The patient states that currently it would be difficult to quit due to home stressors and taking care of her mother who is on hospice.  She is interested in starting Wellbutrin for tobacco cessation.  Also within the differential would be hypersensitivity pneumonitis, though there is very little mosaic attenuation and no 3 density sign.  Also within the differential would be infection though less likely.      Plan:   -Stressed the importance of tobacco cessation.  Wellbutrin provided.  Explained I would like to repeat her CT once she either cuts back or completely stops smoking to assess resolution of her micronodules which would be consistent with RB-ILD  -If her CT worsens or does not respond to tobacco cessation we will pursue additional workup for HP, infection, other etiology  -Given her significant dyspnea will increase her maintenance therapy to Trelegy  -PFTs prior to follow-up    Alannah Barr  reports that she has been smoking cigarettes. She has a 51 pack-year smoking history. She has been exposed to tobacco smoke. She has never used smokeless tobacco. I have educated her on the risk of diseases from using tobacco products such as cancer, COPD, and heart disease. I advised her to quit and she is not willing to quit. I spent 8 minutes counseling the patient.             Follow Up:  6-8 weeks         Thank you Coy Guillermo MD for this referral and allowing me to participate in this patient's care.           Past Medical History:   Past Medical History:   Diagnosis Date    CHF (congestive heart failure)     Chronic pain syndrome     COPD (chronic obstructive pulmonary disease)     Tobacco dependence          Past Surgical History:   Past Surgical History:   Procedure Laterality Date    BREAST BIOPSY Right     CARDIAC CATHETERIZATION N/A 07/29/2023    Procedure: Left Heart Cath;  Surgeon: Osman Patrick DO;  Location:   [facility];  Service: Cardiovascular;  Laterality: N/A;    CARDIAC CATHETERIZATION N/A 08/17/2023    Procedure: Percutaneous Coronary Intervention;  Surgeon: Osman Patrick DO;  Location:  [facility];  Service: Cardiovascular;  Laterality: N/A;    CARDIAC CATHETERIZATION N/A 4/27/2024    Procedure: Left Heart Cath, coronaries, grafts, possible;  Surgeon: Win Ann MD;  Location:  [facility];  Service: Cardiology;  Laterality: N/A;    CARDIAC CATHETERIZATION N/A 8/27/2024    Procedure: Left Heart Cath;  Surgeon: Osman Patrick DO;  Location:  [facility];  Service: Cardiovascular;  Laterality: N/A;    CHOLECYSTECTOMY      CORONARY STENT PLACEMENT  2023    X 2    HYSTERECTOMY      KNEE ARTHROSCOPY W/ MEDIAL COLLATERAL LIGAMENT (MCL) REPAIR Right     x 2    TONSILLECTOMY      US GUIDED LYMPH NODE BIOPSY  12/23/2024         Family History:   Family History   Problem Relation Age of Onset    COPD Mother     Diabetes Father     Heart failure Father     BRCA 1/2 Neg Hx          Medications:   Prior to Admission medications    Medication Sig Start Date End Date Taking? Authorizing Provider   albuterol (PROVENTIL) (2.5 MG/3ML) 0.083% nebulizer solution Take 2.5 mg by nebulization Every 4 (Four) Hours As Needed for Wheezing. 11/14/24  Yes Val Riggs APRN   albuterol sulfate  (90 Base) MCG/ACT inhaler Inhale 2 puffs Every 4 (Four) Hours As Needed for Wheezing.   Yes Provider, MD Zoraida   aspirin 81 MG EC tablet Take 1 tablet by mouth Daily. 8/28/24  Yes Osman Patrick DO   atorvastatin (LIPITOR) 80 MG tablet Take 1 tablet by mouth Every Night. 2/23/25  Yes Osman Patrick DO   bumetanide (BUMEX) 2 MG tablet Take 1 tablet by mouth 2 (Two) Times a Day. 2/23/25  Yes Osman Patrick DO   carvedilol (COREG) 6.25 MG tablet Take 1 tablet by mouth 2 (Two) Times a Day With  Meals. 12/19/24  Yes Osman Patrick DO   clopidogrel (PLAVIX) 75 MG tablet Take 1 tablet by mouth Daily. 2/23/25  Yes Osman Patrick DO   gabapentin (NEURONTIN) 600 MG tablet Take 1 tablet by mouth 3 (Three) Times a Day.   Yes Zoraida Avalos MD   HYDROcodone-acetaminophen (NORCO)  MG per tablet Take 1 tablet by mouth 4 (Four) Times a Day.   Yes Zoraida Avalos MD   hydrOXYzine pamoate (VISTARIL) 25 MG capsule Take 1 capsule by mouth 3 (Three) Times a Day As Needed for Itching or Anxiety.  Patient taking differently: Take 1 capsule by mouth As Needed for Itching or Anxiety. 9/18/24  Yes Zoraida Avalos MD   isosorbide mononitrate (IMDUR) 30 MG 24 hr tablet Take 1 tablet by mouth Daily. 2/23/25  Yes Osman Patrick DO   nitroglycerin (Nitrostat) 0.4 MG SL tablet Place 1 tablet under the tongue Every 5 (Five) Minutes As Needed for Chest Pain. Take no more than 3 doses in 15 minutes. 8/23/24  Yes Telly Pruett Jr., MD   pantoprazole (PROTONIX) 40 MG EC tablet Take 1 tablet by mouth 2 (Two) Times a Day.   Yes Zoraida Avalos MD   potassium chloride 10 MEQ CR tablet Take 2 tablets by mouth Daily. 12/19/24  Yes Osman Patrick DO   ranolazine (RANEXA) 500 MG 12 hr tablet Take 1 tablet by mouth 2 (Two) Times a Day. 12/19/24  Yes Osman Patrick DO   sacubitril-valsartan (Entresto) 24-26 MG tablet Take 1 tablet by mouth 2 (Two) Times a Day. 2/23/25  Yes Osman Patrick DO   spironolactone (ALDACTONE) 25 MG tablet Take 0.5 tablets by mouth Every Other Day.  Patient taking differently: Take 0.5 tablets by mouth As Needed. 12/19/24  Yes Osman Patrick DO   topiramate (TOPAMAX) 50 MG tablet Take 1 tablet by mouth Daily.   Yes Zoraida Avalos MD   zolpidem (AMBIEN) 10 MG tablet Take 1 tablet by mouth At Night As Needed for Sleep.   Yes Provider, MD Zoraida   buPROPion SR (Wellbutrin SR) 150 MG 12 hr  "tablet Take 1 tablet by mouth 2 (Two) Times a Day. 3/3/25   Ricky Patrick DO   FLUoxetine (PROzac) 20 MG capsule Take 2 capsules by mouth Daily.  Patient not taking: Reported on 3/3/2025    Provider, MD Zoraida   Fluticasone-Umeclidin-Vilant (TRELEGY ELLIPTA) 200-62.5-25 MCG/ACT inhaler Inhale 1 puff Daily. 3/3/25   Ricky Patrick DO   Fluticasone-Umeclidin-Vilant (TRELEGY ELLIPTA) 200-62.5-25 MCG/ACT inhaler Inhale 1 puff Daily. 3/3/25   Ricky Patrick DO   methylPREDNISolone (MEDROL) 4 MG dose pack Take as directed on package instructions.  Patient not taking: Reported on 3/3/2025 2/23/25   Osman Patrick DO         Allergies:   Levaquin [levofloxacin], Morphine, Codeine, Doxycycline, Metronidazole, Naproxen, Propoxyphene, Tetanus toxoids, and Tetracyclines & related      Results Review:             Visit Vitals  /80   Pulse 87   Resp 18   Ht 152.4 cm (60\")   Wt 77.6 kg (171 lb)   SpO2 94% Comment: r/a   Breastfeeding No   BMI 33.40 kg/m²                  Ricky Patrick DO  Pulmonary/Critical Care  3/5/2025  12:47 CST  "

## 2025-03-14 ENCOUNTER — TELEPHONE (OUTPATIENT)
Dept: PULMONOLOGY | Facility: CLINIC | Age: 60
End: 2025-03-14
Payer: COMMERCIAL

## 2025-03-14 NOTE — TELEPHONE ENCOUNTER
Tried to call patient regarding to upcoming appointment. CT order was put in and needs to be done prior to appointment on 4/28/25. Left voicemail to call scheduling to schedule CT.

## 2025-04-14 ENCOUNTER — HOSPITAL ENCOUNTER (OUTPATIENT)
Dept: MRI IMAGING | Age: 60
Discharge: HOME | End: 2025-04-14
Payer: COMMERCIAL

## 2025-04-14 DIAGNOSIS — M54.16 RADICULOPATHY, LUMBAR REGION: ICD-10-CM

## 2025-04-14 PROCEDURE — 72148 MRI LUMBAR SPINE W/O DYE: CPT

## 2025-04-22 ENCOUNTER — HOSPITAL ENCOUNTER (OUTPATIENT)
Dept: PULMONOLOGY | Facility: HOSPITAL | Age: 60
Discharge: HOME | End: 2025-04-22
Admitting: INTERNAL MEDICINE
Payer: COMMERCIAL

## 2025-04-22 ENCOUNTER — TELEPHONE (OUTPATIENT)
Dept: PULMONOLOGY | Facility: CLINIC | Age: 60
End: 2025-04-22
Payer: COMMERCIAL

## 2025-04-22 DIAGNOSIS — R91.1 PULMONARY NODULE: ICD-10-CM

## 2025-04-22 PROCEDURE — 94010 BREATHING CAPACITY TEST: CPT

## 2025-04-22 RX ORDER — ALBUTEROL SULFATE 0.83 MG/ML
2.5 SOLUTION RESPIRATORY (INHALATION) ONCE
Status: COMPLETED | OUTPATIENT
Start: 2025-04-22 | End: 2025-04-22

## 2025-04-22 RX ADMIN — ALBUTEROL SULFATE 2.5 MG: 2.5 SOLUTION RESPIRATORY (INHALATION) at 08:55

## 2025-04-22 NOTE — TELEPHONE ENCOUNTER
Tried to call patient regarding CT that was scheduled for 4/21/25 to see if she completed it or call and reschedule appointment before 4/28/25 appointment with provider. Not able to reach patient to call scheduling for CT.

## 2025-04-22 NOTE — PROGRESS NOTES
Attempted PFT with patient. After spirometry x1, patient started having anxiety attack and did not want to continue any further. Submitted spirometry x1 for review by Dr. Alegria.

## 2025-04-25 ENCOUNTER — TELEPHONE (OUTPATIENT)
Dept: CARDIOLOGY | Facility: CLINIC | Age: 60
End: 2025-04-25
Payer: COMMERCIAL

## 2025-04-25 NOTE — TELEPHONE ENCOUNTER
[facility] IS REQUESTING CLEARANCE      WITH andrew TAN       ?   PT HAS  cad  ?   PT IS ON  asa 81 mg/ plavix 75 mg     LOV WITH YOU WAS  12/19/24  ?   PLEASE ADVISE

## 2025-04-28 ENCOUNTER — OFFICE VISIT (OUTPATIENT)
Dept: PULMONOLOGY | Facility: CLINIC | Age: 60
End: 2025-04-28
Payer: COMMERCIAL

## 2025-04-28 ENCOUNTER — HOSPITAL ENCOUNTER (OUTPATIENT)
Dept: GENERAL RADIOLOGY | Facility: HOSPITAL | Age: 60
Discharge: HOME | End: 2025-04-28
Payer: COMMERCIAL

## 2025-04-28 ENCOUNTER — LAB (OUTPATIENT)
Dept: LAB | Facility: HOSPITAL | Age: 60
End: 2025-04-28
Payer: COMMERCIAL

## 2025-04-28 VITALS
SYSTOLIC BLOOD PRESSURE: 160 MMHG | HEIGHT: 60 IN | BODY MASS INDEX: 34.99 KG/M2 | DIASTOLIC BLOOD PRESSURE: 92 MMHG | WEIGHT: 178.2 LBS | OXYGEN SATURATION: 96 % | HEART RATE: 114 BPM

## 2025-04-28 DIAGNOSIS — J44.9 CHRONIC OBSTRUCTIVE PULMONARY DISEASE, UNSPECIFIED COPD TYPE: ICD-10-CM

## 2025-04-28 DIAGNOSIS — Z72.0 TOBACCO USE: ICD-10-CM

## 2025-04-28 DIAGNOSIS — J84.115 RESPIRATORY BRONCHIOLITIS ASSOCIATED INTERSTITIAL LUNG DISEASE: Primary | ICD-10-CM

## 2025-04-28 DIAGNOSIS — R06.09 CHRONIC DYSPNEA: ICD-10-CM

## 2025-04-28 DIAGNOSIS — R05.3 CHRONIC COUGH: ICD-10-CM

## 2025-04-28 LAB
ANION GAP SERPL CALCULATED.3IONS-SCNC: 12 MMOL/L (ref 5–15)
BUN SERPL-MCNC: 8 MG/DL (ref 6–20)
BUN/CREAT SERPL: 11 (ref 7–25)
CALCIUM SPEC-SCNC: 8.8 MG/DL (ref 8.6–10.5)
CHLORIDE SERPL-SCNC: 101 MMOL/L (ref 98–107)
CO2 SERPL-SCNC: 24 MMOL/L (ref 22–29)
CREAT SERPL-MCNC: 0.73 MG/DL (ref 0.57–1)
EGFRCR SERPLBLD CKD-EPI 2021: 96.1 ML/MIN/1.73
GLUCOSE SERPL-MCNC: 113 MG/DL (ref 65–99)
MAGNESIUM SERPL-MCNC: 1.7 MG/DL (ref 1.6–2.6)
POTASSIUM SERPL-SCNC: 3.6 MMOL/L (ref 3.5–5.2)
SODIUM SERPL-SCNC: 137 MMOL/L (ref 136–145)

## 2025-04-28 PROCEDURE — 99213 OFFICE O/P EST LOW 20 MIN: CPT | Performed by: INTERNAL MEDICINE

## 2025-04-28 PROCEDURE — 83735 ASSAY OF MAGNESIUM: CPT

## 2025-04-28 PROCEDURE — 71046 X-RAY EXAM CHEST 2 VIEWS: CPT

## 2025-04-28 PROCEDURE — 94729 DIFFUSING CAPACITY: CPT | Performed by: INTERNAL MEDICINE

## 2025-04-28 PROCEDURE — 94375 RESPIRATORY FLOW VOLUME LOOP: CPT | Performed by: INTERNAL MEDICINE

## 2025-04-28 PROCEDURE — 80048 BASIC METABOLIC PNL TOTAL CA: CPT

## 2025-04-28 PROCEDURE — 36415 COLL VENOUS BLD VENIPUNCTURE: CPT

## 2025-04-28 RX ORDER — SPIRONOLACTONE AND HYDROCHLOROTHIAZIDE 25; 25 MG/1; MG/1
1 TABLET ORAL DAILY
COMMUNITY

## 2025-04-28 RX ORDER — CARVEDILOL 6.25 MG/1
6.25 TABLET ORAL 2 TIMES DAILY WITH MEALS
Qty: 180 TABLET | Refills: 0 | Status: SHIPPED | OUTPATIENT
Start: 2025-04-28

## 2025-04-28 RX ORDER — ALBUTEROL SULFATE 90 UG/1
2 INHALANT RESPIRATORY (INHALATION) EVERY 4 HOURS PRN
Qty: 18 G | Refills: 5 | Status: SHIPPED | OUTPATIENT
Start: 2025-04-28

## 2025-04-28 RX ORDER — BUSPIRONE HYDROCHLORIDE 5 MG/1
5 TABLET ORAL 2 TIMES DAILY
COMMUNITY

## 2025-04-28 RX ORDER — ALBUTEROL SULFATE 0.83 MG/ML
2.5 SOLUTION RESPIRATORY (INHALATION) EVERY 4 HOURS PRN
Qty: 120 ML | Refills: 5 | Status: SHIPPED | OUTPATIENT
Start: 2025-04-28

## 2025-04-28 NOTE — LETTER
2025     Coy Guillermo MD  [address] [facility] [address]    Patient: Alannah Barr   YOB: 1965   Date of Visit: 2025     Dear Dr. Mima MD:    Thank you for referring Alannah Barr to me for evaluation. Below are the relevant portions of my assessment and plan of care.    If you have questions, please do not hesitate to call me. I look forward to following Alannah along with you.         Sincerely,        Alex Patrick,         CC: No Recipients      Progress Notes:    Clinic Note - Follow Up            NAME: Alannah Barr  MRN: [id number]  AGE: 59 y.o.            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    History of Present Illness:  Mrs. Barr is a 59-year-old female who presents to clinic today as a new patient for evaluation of pulmonary nodules. Past medical history includes tobacco use, COPD, asthma.     The patient was last seen as a new patient on 3/3/2025.  She was seen for abnormal chest CT.  Explained chest CT consistent with RB-ILD and recommended tobacco cessation.  Today the patient continues to have significant stress at home due to her terminally ill mother.  She has started the Wellbutrin, which is unfortunately too nausea.  When she does tolerate the Wellbutrin she notes less of an urge to smoke.  We discussed cutting the Wellbutrin in half.  Additionally since her last visit the patient had issues with chest pain and volume overload.  She takes the Bumex as prescribed but still notes bilateral lower extremity edema and weight gain.  She has also noted chest pain recently only relieved by nitro.    Review of Systems:  A full 12-point review of systems was performed and was negative except as documented in the HPI.            Physical Exam:  General: No acute  distress, cooperative.  Head: Atraumatic, normocephalic, normal inspection.  Eyes: EOMI, normal inspection.  ENT: Mucous membranes moist, normal inspection. No thrush.  Heart: RRR, no murmur  Lungs: Diminished bilaterally  MSK: No edema or clubbing  GI/abdominal: Soft, nontender.  Neurologic: Alert, oriented.  Cranial nerves II through XII grossly intact.      Imaging Review:  No new chest imaging for review.      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  Tobacco cessation still difficult given life stressors.  Will continue Wellbutrin.  Refills sent for her inhalers.  Regarding her cardiac issues, I was able to contact the cardiology office and have the patient scheduled to be seen in 2 days.  Will go ahead and send in refill for her Coreg.  2 view chest x-ray to assess for volume overload.      Plan:   - Continue Trelegy and as needed albuterol  - Continue to cut back on cigarettes with the assistance of Wellbutrin.  Will decrease Wellbutrin to 75  mg twice daily due to nausea  - Two-view chest x-ray today  - Urgent evaluation in the cardiology clinic, appointment scheduled for 4/30  - BMP and magnesium today  - Will provide refill for her Coreg until she is able to see cardiology    Marketta Elizabeth Barr  reports that she has been smoking cigarettes. She has a 51 pack-year smoking history. She has been exposed to tobacco smoke. She has never used smokeless tobacco. I have educated her on the risk of diseases from using tobacco products such as cancer, COPD, and heart disease.     I advised her to quit and she is willing to quit. We have discussed the following method/s for tobacco cessation:  Prescription Medication.    I spent 7.5 minutes counseling the patient.           Follow Up:  6 months         Thank you Coy Guillermo MD for allowing me to participate in this patient's care.           Past Medical History:   Past Medical History:   Diagnosis Date   • CHF (congestive heart failure)    • Chronic pain syndrome    • COPD (chronic obstructive pulmonary disease)    • Tobacco dependence          Past Surgical History:   Past Surgical History:   Procedure Laterality Date   • BREAST BIOPSY Right    • CARDIAC CATHETERIZATION N/A 07/29/2023    Procedure: Left Heart Cath;  Surgeon: Osman Patrick DO;  Location:  [facility];  Service: Cardiovascular;  Laterality: N/A;   • CARDIAC CATHETERIZATION N/A 08/17/2023    Procedure: Percutaneous Coronary Intervention;  Surgeon: Osman Patrick DO;  Location:  [facility];  Service: Cardiovascular;  Laterality: N/A;   • CARDIAC CATHETERIZATION N/A 4/27/2024    Procedure: Left Heart Cath, coronaries, grafts, possible;  Surgeon: Win Ann MD;  Location:  [facility];  Service: Cardiology;  Laterality: N/A;   • CARDIAC CATHETERIZATION N/A 8/27/2024    Procedure: Left Heart Cath;  Surgeon: Osman Patrick DO;   Location:  [facility];  Service: Cardiovascular;  Laterality: N/A;   • CHOLECYSTECTOMY     • CORONARY STENT PLACEMENT  2023    X 2   • HYSTERECTOMY     • KNEE ARTHROSCOPY W/ MEDIAL COLLATERAL LIGAMENT (MCL) REPAIR Right     x 2   • TONSILLECTOMY     • US GUIDED LYMPH NODE BIOPSY  12/23/2024         Family History:   Family History   Problem Relation Age of Onset   • COPD Mother    • Diabetes Father    • Heart failure Father    • BRCA 1/2 Neg Hx          Medications:   Prior to Admission medications    Medication Sig Start Date End Date Taking? Authorizing Provider   albuterol (PROVENTIL) (2.5 MG/3ML) 0.083% nebulizer solution Take 2.5 mg by nebulization Every 4 (Four) Hours As Needed for Wheezing. 4/28/25  Yes Ricky Patrick DO   albuterol sulfate  (90 Base) MCG/ACT inhaler Inhale 2 puffs Every 4 (Four) Hours As Needed for Wheezing. 4/28/25  Yes Ricky Patrick DO   aspirin 81 MG EC tablet Take 1 tablet by mouth Daily. 8/28/24  Yes Osman Patrick DO   atorvastatin (LIPITOR) 80 MG tablet Take 1 tablet by mouth Every Night. 2/23/25  Yes Osman Patrick DO   bumetanide (BUMEX) 2 MG tablet Take 1 tablet by mouth 2 (Two) Times a Day. 2/23/25  Yes Osman Patrick DO   buPROPion SR (Wellbutrin SR) 150 MG 12 hr tablet Take 1 tablet by mouth 2 (Two) Times a Day. 3/3/25  Yes Ricky Patrick DO   busPIRone (BUSPAR) 5 MG tablet Take 1 tablet by mouth 2 (Two) Times a Day.   Yes ProviderZoraida MD   carvedilol (COREG) 6.25 MG tablet Take 1 tablet by mouth 2 (Two) Times a Day With Meals. 4/28/25  Yes Ricky Patrick DO   clopidogrel (PLAVIX) 75 MG tablet Take 1 tablet by mouth Daily. 2/23/25  Yes Osman Patrick DO   Fluticasone-Umeclidin-Vilant (TRELEGY ELLIPTA) 200-62.5-25 MCG/ACT inhaler Inhale 1 puff Daily. 4/28/25  Yes Ricky Patrick, DO   gabapentin (NEURONTIN) 600 MG tablet Take 1 tablet by  mouth 3 (Three) Times a Day.   Yes Zoraida Avalos MD   HYDROcodone-acetaminophen (NORCO)  MG per tablet Take 1 tablet by mouth 4 (Four) Times a Day.   Yes Zoraida Avalos MD   hydrOXYzine pamoate (VISTARIL) 25 MG capsule Take 1 capsule by mouth 3 (Three) Times a Day As Needed for Itching or Anxiety.  Patient taking differently: Take 1 capsule by mouth As Needed for Itching or Anxiety. 9/18/24  Yes Zoraida Avalos MD   isosorbide mononitrate (IMDUR) 30 MG 24 hr tablet Take 1 tablet by mouth Daily. 2/23/25  Yes Osman Patrick DO   nitroglycerin (Nitrostat) 0.4 MG SL tablet Place 1 tablet under the tongue Every 5 (Five) Minutes As Needed for Chest Pain. Take no more than 3 doses in 15 minutes. 8/23/24  Yes Telly Pruett Jr., MD   pantoprazole (PROTONIX) 40 MG EC tablet Take 1 tablet by mouth 2 (Two) Times a Day.   Yes Zoraida Avalos MD   potassium chloride 10 MEQ CR tablet Take 2 tablets by mouth Daily. 12/19/24  Yes Osman Patrick DO   ranolazine (RANEXA) 500 MG 12 hr tablet Take 1 tablet by mouth 2 (Two) Times a Day. 12/19/24  Yes Osman Patrick DO   sacubitril-valsartan (Entresto) 24-26 MG tablet Take 1 tablet by mouth 2 (Two) Times a Day. 2/23/25  Yes Osman Patrick DO   spironolactone (ALDACTONE) 25 MG tablet Take 0.5 tablets by mouth Every Other Day.  Patient taking differently: Take 0.5 tablets by mouth As Needed. 12/19/24  Yes Osman Patrick DO   spironolactone-hydrochlorothiazide (ALDACTAZIDE) 25-25 MG tablet Take 1 tablet by mouth Daily.   Yes Zoraida Avalos MD   topiramate (TOPAMAX) 50 MG tablet Take 1 tablet by mouth Daily.   Yes Zoraida Avalos MD   zolpidem (AMBIEN) 10 MG tablet Take 1 tablet by mouth At Night As Needed for Sleep.   Yes Zoraida Avalos MD   albuterol (PROVENTIL) (2.5 MG/3ML) 0.083% nebulizer solution Take 2.5 mg by nebulization Every 4 (Four) Hours As Needed for Wheezing.  "11/14/24 4/28/25 Yes Val Riggs APRN   albuterol sulfate  (90 Base) MCG/ACT inhaler Inhale 2 puffs Every 4 (Four) Hours As Needed for Wheezing.  4/28/25 Yes Zoraida Avalos MD   carvedilol (COREG) 6.25 MG tablet Take 1 tablet by mouth 2 (Two) Times a Day With Meals. 12/19/24 4/28/25 Yes Osman Patrick DO   Fluticasone-Umeclidin-Vilant (TRELEGY ELLIPTA) 200-62.5-25 MCG/ACT inhaler Inhale 1 puff Daily. 3/3/25 4/28/25 Yes Ricky Patrick DO   Fluticasone-Umeclidin-Vilant (TRELEGY ELLIPTA) 200-62.5-25 MCG/ACT inhaler Inhale 1 puff Daily. 3/3/25 4/28/25 Yes Ricky Patrick DO   FLUoxetine (PROzac) 20 MG capsule Take 2 capsules by mouth Daily.  Patient not taking: Reported on 3/3/2025    ProviderZoraida MD   methylPREDNISolone (MEDROL) 4 MG dose pack Take as directed on package instructions.  Patient not taking: Reported on 4/28/2025 2/23/25   Osman Patrick DO         Allergies:   Levaquin [levofloxacin], Morphine, Codeine, Doxycycline, Metronidazole, Naproxen, Propoxyphene, Tetanus toxoids, and Tetracyclines & related      Results Review:   Results from last 7 days   Lab Units 04/28/25  1158   SODIUM mmol/L 137   POTASSIUM mmol/L 3.6   CHLORIDE mmol/L 101   CO2 mmol/L 24.0   BUN mg/dL 8   CALCIUM mg/dL 8.8           Visit Vitals  /92   Pulse 114   Ht 152.4 cm (60\")   Wt 80.8 kg (178 lb 3.2 oz)   SpO2 96% Comment: RA   Breastfeeding No   BMI 34.80 kg/m²           Social History:     Social History     Socioeconomic History   • Marital status: Single   Tobacco Use   • Smoking status: Every Day     Current packs/day: 1.00     Average packs/day: 1 pack/day for 51.0 years (51.0 ttl pk-yrs)     Types: Cigarettes     Passive exposure: Current   • Smokeless tobacco: Never   • Tobacco comments:     Smoking a pack of cigarettes. Reported 3/3/25   Vaping Use   • Vaping status: Never Used   Substance and Sexual Activity   • Alcohol use: Not Currently "   • Drug use: Not Currently   • Sexual activity: Defer                Ricky Patrick DO  Pulmonary/Critical Care  4/28/2025  13:01 CDT

## 2025-04-28 NOTE — PROGRESS NOTES
Clinic Note - Follow Up            NAME: Alannah Barr  MRN: [id number]  AGE: 59 y.o.            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    History of Present Illness:  Mrs. Barr is a 59-year-old female who presents to clinic today as a new patient for evaluation of pulmonary nodules. Past medical history includes tobacco use, COPD, asthma.     The patient was last seen as a new patient on 3/3/2025.  She was seen for abnormal chest CT.  Explained chest CT consistent with RB-ILD and recommended tobacco cessation.  Today the patient continues to have significant stress at home due to her terminally ill mother.  She has started the Wellbutrin, which is unfortunately too nausea.  When she does tolerate the Wellbutrin she notes less of an urge to smoke.  We discussed cutting the Wellbutrin in half.  Additionally since her last visit the patient had issues with chest pain and volume overload.  She takes the Bumex as prescribed but still notes bilateral lower extremity edema and weight gain.  She has also noted chest pain recently only relieved by nitro.    Review of Systems:  A full 12-point review of systems was performed and was negative except as documented in the HPI.            Physical Exam:  General: No acute distress, cooperative.  Head: Atraumatic, normocephalic, normal inspection.  Eyes: EOMI, normal inspection.  ENT: Mucous membranes moist, normal inspection. No thrush.  Heart: RRR, no murmur  Lungs: Diminished bilaterally  MSK: No edema or clubbing  GI/abdominal: Soft, nontender.  Neurologic: Alert, oriented.  Cranial nerves II through XII grossly intact.      Imaging Review:  No new chest imaging for review.      Pulmonary Functions Testing Results:  2025: Moderate COPD with an FEV1 of 68, DLCO 51            Problem List:  Problem List  Items Addressed This Visit       COPD (chronic obstructive pulmonary disease)    Relevant Medications    Fluticasone-Umeclidin-Vilant (TRELEGY ELLIPTA) 200-62.5-25 MCG/ACT inhaler    albuterol (PROVENTIL) (2.5 MG/3ML) 0.083% nebulizer solution    albuterol sulfate  (90 Base) MCG/ACT inhaler    Other Relevant Orders    XR Chest 2 View (Completed)    Basic Metabolic Panel (Completed)    Magnesium (Completed)    Respiratory bronchiolitis associated interstitial lung disease - Primary    Relevant Medications    Fluticasone-Umeclidin-Vilant (TRELEGY ELLIPTA) 200-62.5-25 MCG/ACT inhaler    albuterol (PROVENTIL) (2.5 MG/3ML) 0.083% nebulizer solution    albuterol sulfate  (90 Base) MCG/ACT inhaler    Chronic cough    Chronic dyspnea    Tobacco use         Pulmonary Assessment:  Patient is a 59-year-old female with history of COPD, decreased DLCO, 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  Tobacco cessation still difficult given life stressors.  Will continue Wellbutrin.  Refills sent for her inhalers.  Regarding her cardiac issues, I was able to contact the cardiology office and have the patient scheduled to be seen in 2 days.  Will go ahead and send in refill for her Coreg.  2 view chest x-ray to assess for volume overload.      Plan:   - Continue Trelegy and as needed albuterol  - Continue to cut back on cigarettes with the assistance of Wellbutrin.  Will decrease Wellbutrin to 75 mg twice daily due to nausea  - Two-view chest x-ray today  - Urgent evaluation in the cardiology clinic, appointment scheduled for 4/30  - BMP and magnesium today  - Will provide refill for her Coreg until she is able to see cardiology    Alannah Elizabeth Barr  reports that she has been smoking cigarettes. She has a 51 pack-year smoking history. She has been exposed to tobacco smoke. She has never used smokeless tobacco. I have educated her on the risk of diseases from using tobacco products such as cancer, COPD, and heart disease.     I  advised her to quit and she is willing to quit. We have discussed the following method/s for tobacco cessation:  Prescription Medication.    I spent 7.5 minutes counseling the patient.           Follow Up:  6 months         Thank you Coy Guillermo MD for allowing me to participate in this patient's care.           Past Medical History:   Past Medical History:   Diagnosis Date    CHF (congestive heart failure)     Chronic pain syndrome     COPD (chronic obstructive pulmonary disease)     Tobacco dependence          Past Surgical History:   Past Surgical History:   Procedure Laterality Date    BREAST BIOPSY Right     CARDIAC CATHETERIZATION N/A 07/29/2023    Procedure: Left Heart Cath;  Surgeon: Osman Patrick DO;  Location:  [facility];  Service: Cardiovascular;  Laterality: N/A;    CARDIAC CATHETERIZATION N/A 08/17/2023    Procedure: Percutaneous Coronary Intervention;  Surgeon: Osman Patrick DO;  Location:  [facility];  Service: Cardiovascular;  Laterality: N/A;    CARDIAC CATHETERIZATION N/A 4/27/2024    Procedure: Left Heart Cath, coronaries, grafts, possible;  Surgeon: Win Ann MD;  Location:  [facility];  Service: Cardiology;  Laterality: N/A;    CARDIAC CATHETERIZATION N/A 8/27/2024    Procedure: Left Heart Cath;  Surgeon: Osman Patrick DO;  Location:  [facility];  Service: Cardiovascular;  Laterality: N/A;    CHOLECYSTECTOMY      CORONARY STENT PLACEMENT  2023    X 2    HYSTERECTOMY      KNEE ARTHROSCOPY W/ MEDIAL COLLATERAL LIGAMENT (MCL) REPAIR Right     x 2    TONSILLECTOMY      US GUIDED LYMPH NODE BIOPSY  12/23/2024         Family History:   Family History   Problem Relation Age of Onset    COPD Mother     Diabetes Father     Heart failure Father     BRCA 1/2 Neg Hx          Medications:   Prior to Admission medications    Medication Sig Start Date End Date Taking?  Authorizing Provider   albuterol (PROVENTIL) (2.5 MG/3ML) 0.083% nebulizer solution Take 2.5 mg by nebulization Every 4 (Four) Hours As Needed for Wheezing. 4/28/25  Yes Ricky Patrick DO   albuterol sulfate  (90 Base) MCG/ACT inhaler Inhale 2 puffs Every 4 (Four) Hours As Needed for Wheezing. 4/28/25  Yes Ricky Patrick DO   aspirin 81 MG EC tablet Take 1 tablet by mouth Daily. 8/28/24  Yes Osman Patrick DO   atorvastatin (LIPITOR) 80 MG tablet Take 1 tablet by mouth Every Night. 2/23/25  Yes Osman Patrick DO   bumetanide (BUMEX) 2 MG tablet Take 1 tablet by mouth 2 (Two) Times a Day. 2/23/25  Yes Osman Patrick DO   buPROPion SR (Wellbutrin SR) 150 MG 12 hr tablet Take 1 tablet by mouth 2 (Two) Times a Day. 3/3/25  Yes Ricky Patrick DO   busPIRone (BUSPAR) 5 MG tablet Take 1 tablet by mouth 2 (Two) Times a Day.   Yes ProviderZoraida MD   carvedilol (COREG) 6.25 MG tablet Take 1 tablet by mouth 2 (Two) Times a Day With Meals. 4/28/25  Yes Ricky Patrick DO   clopidogrel (PLAVIX) 75 MG tablet Take 1 tablet by mouth Daily. 2/23/25  Yes Osman Patrick DO   Fluticasone-Umeclidin-Vilant (TRELEGY ELLIPTA) 200-62.5-25 MCG/ACT inhaler Inhale 1 puff Daily. 4/28/25  Yes Ricky Patrick DO   gabapentin (NEURONTIN) 600 MG tablet Take 1 tablet by mouth 3 (Three) Times a Day.   Yes ProviderZoraida MD   HYDROcodone-acetaminophen (NORCO)  MG per tablet Take 1 tablet by mouth 4 (Four) Times a Day.   Yes Zoraida Avalos MD   hydrOXYzine pamoate (VISTARIL) 25 MG capsule Take 1 capsule by mouth 3 (Three) Times a Day As Needed for Itching or Anxiety.  Patient taking differently: Take 1 capsule by mouth As Needed for Itching or Anxiety. 9/18/24  Yes Provider, MD Zoraida   isosorbide mononitrate (IMDUR) 30 MG 24 hr tablet Take 1 tablet by mouth Daily. 2/23/25  Yes Osman Patrick,     nitroglycerin (Nitrostat) 0.4 MG SL tablet Place 1 tablet under the tongue Every 5 (Five) Minutes As Needed for Chest Pain. Take no more than 3 doses in 15 minutes. 8/23/24  Yes Telly Pruett Jr., MD   pantoprazole (PROTONIX) 40 MG EC tablet Take 1 tablet by mouth 2 (Two) Times a Day.   Yes ProviderZoraida MD   potassium chloride 10 MEQ CR tablet Take 2 tablets by mouth Daily. 12/19/24  Yes Osman Patrick DO   ranolazine (RANEXA) 500 MG 12 hr tablet Take 1 tablet by mouth 2 (Two) Times a Day. 12/19/24  Yes Osman Patrick DO   sacubitril-valsartan (Entresto) 24-26 MG tablet Take 1 tablet by mouth 2 (Two) Times a Day. 2/23/25  Yes Osman Patrick DO   spironolactone (ALDACTONE) 25 MG tablet Take 0.5 tablets by mouth Every Other Day.  Patient taking differently: Take 0.5 tablets by mouth As Needed. 12/19/24  Yes Osman Patrick DO   spironolactone-hydrochlorothiazide (ALDACTAZIDE) 25-25 MG tablet Take 1 tablet by mouth Daily.   Yes ProviderZoraida MD   topiramate (TOPAMAX) 50 MG tablet Take 1 tablet by mouth Daily.   Yes ProviderZoraida MD   zolpidem (AMBIEN) 10 MG tablet Take 1 tablet by mouth At Night As Needed for Sleep.   Yes ProviderZoraida MD   albuterol (PROVENTIL) (2.5 MG/3ML) 0.083% nebulizer solution Take 2.5 mg by nebulization Every 4 (Four) Hours As Needed for Wheezing. 11/14/24 4/28/25 Yes Val Riggs APRN   albuterol sulfate  (90 Base) MCG/ACT inhaler Inhale 2 puffs Every 4 (Four) Hours As Needed for Wheezing.  4/28/25 Yes Zoraida Avalos MD   carvedilol (COREG) 6.25 MG tablet Take 1 tablet by mouth 2 (Two) Times a Day With Meals. 12/19/24 4/28/25 Yes Osman Patrick DO   Fluticasone-Umeclidin-Vilant (TRELEGY ELLIPTA) 200-62.5-25 MCG/ACT inhaler Inhale 1 puff Daily. 3/3/25 4/28/25 Yes Ricky Patrick DO   Fluticasone-Umeclidin-Vilant (TRELEGY ELLIPTA) 200-62.5-25 MCG/ACT inhaler  "Inhale 1 puff Daily. 3/3/25 4/28/25 Yes Ricky Patrick DO   FLUoxetine (PROzac) 20 MG capsule Take 2 capsules by mouth Daily.  Patient not taking: Reported on 3/3/2025    Provider, MD Zoraida   methylPREDNISolone (MEDROL) 4 MG dose pack Take as directed on package instructions.  Patient not taking: Reported on 4/28/2025 2/23/25   Osman Patrick DO         Allergies:   Levaquin [levofloxacin], Morphine, Codeine, Doxycycline, Metronidazole, Naproxen, Propoxyphene, Tetanus toxoids, and Tetracyclines & related      Results Review:   Results from last 7 days   Lab Units 04/28/25  1158   SODIUM mmol/L 137   POTASSIUM mmol/L 3.6   CHLORIDE mmol/L 101   CO2 mmol/L 24.0   BUN mg/dL 8   CALCIUM mg/dL 8.8           Visit Vitals  /92   Pulse 114   Ht 152.4 cm (60\")   Wt 80.8 kg (178 lb 3.2 oz)   SpO2 96% Comment: RA   Breastfeeding No   BMI 34.80 kg/m²           Social History:     Social History     Socioeconomic History    Marital status: Single   Tobacco Use    Smoking status: Every Day     Current packs/day: 1.00     Average packs/day: 1 pack/day for 51.0 years (51.0 ttl pk-yrs)     Types: Cigarettes     Passive exposure: Current    Smokeless tobacco: Never    Tobacco comments:     Smoking a pack of cigarettes. Reported 3/3/25   Vaping Use    Vaping status: Never Used   Substance and Sexual Activity    Alcohol use: Not Currently    Drug use: Not Currently    Sexual activity: Defer                Ricky Patrick DO  Pulmonary/Critical Care  4/28/2025  13:01 CDT  "

## 2025-04-28 NOTE — PROCEDURES
Spirometry with Diffusion Capacity & Lung Volumes    Performed by: Rose Juarez, RRT  Authorized by: Ricky Patrick DO     Pre Drug % Predicted    FVC: 72%   FEV1: 68%   FEF 25-75%: 54%   FEV1/FVC: 76%   DLCO: 51%   D/VAsb: 62%    Interpretation   Spirometry   Spirometry shows moderate obstruction. There is reduced midflow suggesting small airway/airflow obstruction.   Review of FVL curve   Patient's effort is normal.   Diffusion Capacity  The patient's diffusion capacity is mildly reduced.    Overall comments: Attempted lung volumes x 2.  Patient unable to keep a tight seal.  Patient was not willing to use the additional mouthpiece to help with seal.

## 2025-04-28 NOTE — LETTER
2025     Coy Guillermo MD  [address] [facility] [address]    Patient: Alannah Barr   YOB: 1965   Date of Visit: 2025     Dear Dr. Mima MD:    Thank you for referring Alannah Barr to me for evaluation. Below are the relevant portions of my assessment and plan of care.    If you have questions, please do not hesitate to call me. I look forward to following Alannah along with you.         Sincerely,        Alex Patrick,         CC: No Recipients      Progress Notes:    Clinic Note - Follow Up            NAME: Alannah Barr  MRN: [id number]  AGE: 59 y.o.            : 1965  PRIMARY CARE PROVIDER: Coy Guillermo MD               Reason for Visit:  Alannah Barr presents to clinic today for follow up for the evaluation of ***.    History of Present Illness:  Mrs. Barr is a 59-year-old female who presents to clinic today as a new patient for evaluation of pulmonary nodules. Past medical history includes tobacco use, COPD, asthma.     The patient was last seen as a new patient on 3/3/2025.  She was seen for abnormal chest CT.  Explained chest CT consistent with RB-ILD and recommended tobacco cessation.  Today the patient continues to have significant stress at home due to her terminally ill mother.  She has started the Wellbutrin, which is unfortunately too nausea.  When she does tolerate the Wellbutrin she notes less of an urge to smoke.  We discussed cutting the Wellbutrin in half.  Additionally since her last visit the patient had issues with chest pain and volume overload.  She takes the Bumex as prescribed but still notes bilateral lower extremity edema and weight gain.  She has also noted chest pain recently only relieved by nitro.    Review of Systems:  A full 12-point review of systems was performed and was negative except as documented in the HPI.            Physical Exam:  General: No acute distress,  cooperative.  Head: Atraumatic, normocephalic, normal inspection.  Eyes: EOMI, normal inspection.  ENT: Mucous membranes moist, normal inspection. No thrush.  Heart: RRR, no murmur  Lungs: Diminished bilaterally  MSK: No edema or clubbing  GI/abdominal: Soft, nontender.  Neurologic: Alert, oriented.  Cranial nerves II through XII grossly intact.      Imaging Review:  No new chest imaging for review.      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  Tobacco cessation still difficult given life stressors.  Will continue Wellbutrin.  Refills sent for her inhalers.  Regarding her cardiac issues, I was able to contact the cardiology office and have the patient scheduled to be seen in 2 days.  Will go ahead and send in refill for her Coreg.  2 view chest x-ray to assess for volume overload.      Plan:   - Continue Trelegy and as needed albuterol  - Continue to cut back on cigarettes with the assistance of Wellbutrin.  Will decrease Wellbutrin to 75 mg twice  daily due to nausea  - Two-view chest x-ray today  - Urgent evaluation in the cardiology clinic, appointment scheduled for 4/30  - BMP and magnesium today  - Will provide refill for her Coreg until she is able to see cardiology    Marketta Elizabeth Barr  reports that she has been smoking cigarettes. She has a 51 pack-year smoking history. She has been exposed to tobacco smoke. She has never used smokeless tobacco. I have educated her on the risk of diseases from using tobacco products such as cancer, COPD, and heart disease.     I advised her to quit and she is willing to quit. We have discussed the following method/s for tobacco cessation:  Prescription Medication.    I spent 7.5 minutes counseling the patient.           Follow Up:  6 months         Thank you Coy Guillermo MD for allowing me to participate in this patient's care.           Past Medical History:   Past Medical History:   Diagnosis Date   • CHF (congestive heart failure)    • Chronic pain syndrome    • COPD (chronic obstructive pulmonary disease)    • Tobacco dependence          Past Surgical History:   Past Surgical History:   Procedure Laterality Date   • BREAST BIOPSY Right    • CARDIAC CATHETERIZATION N/A 07/29/2023    Procedure: Left Heart Cath;  Surgeon: Osman Patrick DO;  Location:  [facility] CATH INVASIVE LOCATION;  Service: Cardiovascular;  Laterality: N/A;   • CARDIAC CATHETERIZATION N/A 08/17/2023    Procedure: Percutaneous Coronary Intervention;  Surgeon: Osman Patrick DO;  Location:  [facility] CATH INVASIVE LOC[facility];  Service: Cardiovascular;  Laterality: N/A;   • CARDIAC CATHETERIZATION N/A 4/27/2024    Procedure: Left Heart Cath, coronaries, grafts, possible;  Surgeon: Win Ann MD;  Location:  [facility] CATH INVASIVE LOC[facility];  Service: Cardiology;  Laterality: N/A;   • CARDIAC CATHETERIZATION N/A 8/27/2024    Procedure: Left Heart Cath;  Surgeon: Osman Patrick DO;  Location:  [facility]  CATH INVASIVE LOCATION;  Service: Cardiovascular;  Laterality: N/A;   • CHOLECYSTECTOMY     • CORONARY STENT PLACEMENT  2023    X 2   • HYSTERECTOMY     • KNEE ARTHROSCOPY W/ MEDIAL COLLATERAL LIGAMENT (MCL) REPAIR Right     x 2   • TONSILLECTOMY     • US GUIDED LYMPH NODE BIOPSY  12/23/2024         Family History:   Family History   Problem Relation Age of Onset   • COPD Mother    • Diabetes Father    • Heart failure Father    • BRCA 1/2 Neg Hx          Medications:   Prior to Admission medications    Medication Sig Start Date End Date Taking? Authorizing Provider   albuterol (PROVENTIL) (2.5 MG/3ML) 0.083% nebulizer solution Take 2.5 mg by nebulization Every 4 (Four) Hours As Needed for Wheezing. 4/28/25  Yes Ricky Patrick DO   albuterol sulfate  (90 Base) MCG/ACT inhaler Inhale 2 puffs Every 4 (Four) Hours As Needed for Wheezing. 4/28/25  Yes Ricky Patrick DO   aspirin 81 MG EC tablet Take 1 tablet by mouth Daily. 8/28/24  Yes Osman Patrick DO   atorvastatin (LIPITOR) 80 MG tablet Take 1 tablet by mouth Every Night. 2/23/25  Yes Osman Patrick DO   bumetanide (BUMEX) 2 MG tablet Take 1 tablet by mouth 2 (Two) Times a Day. 2/23/25  Yes Osman Patrick DO   buPROPion SR (Wellbutrin SR) 150 MG 12 hr tablet Take 1 tablet by mouth 2 (Two) Times a Day. 3/3/25  Yes Ricky Patrick DO   busPIRone (BUSPAR) 5 MG tablet Take 1 tablet by mouth 2 (Two) Times a Day.   Yes Zoraida Avalos MD   carvedilol (COREG) 6.25 MG tablet Take 1 tablet by mouth 2 (Two) Times a Day With Meals. 4/28/25  Yes Ricky Patrick DO   clopidogrel (PLAVIX) 75 MG tablet Take 1 tablet by mouth Daily. 2/23/25  Yes Osman Patrick DO   Fluticasone-Umeclidin-Vilant (TRELEGY ELLIPTA) 200-62.5-25 MCG/ACT inhaler Inhale 1 puff Daily. 4/28/25  Yes Ricky Patrick DO   gabapentin (NEURONTIN) 600 MG tablet Take 1 tablet by mouth 3 (Three)  Times a Day.   Yes Zoraida Avalos MD   HYDROcodone-acetaminophen (NORCO)  MG per tablet Take 1 tablet by mouth 4 (Four) Times a Day.   Yes Zoraida Avalos MD   hydrOXYzine pamoate (VISTARIL) 25 MG capsule Take 1 capsule by mouth 3 (Three) Times a Day As Needed for Itching or Anxiety.  Patient taking differently: Take 1 capsule by mouth As Needed for Itching or Anxiety. 9/18/24  Yes Zoraida Avalos MD   isosorbide mononitrate (IMDUR) 30 MG 24 hr tablet Take 1 tablet by mouth Daily. 2/23/25  Yes Osman Patrick DO   nitroglycerin (Nitrostat) 0.4 MG SL tablet Place 1 tablet under the tongue Every 5 (Five) Minutes As Needed for Chest Pain. Take no more than 3 doses in 15 minutes. 8/23/24  Yes Telly Pruett Jr., MD   pantoprazole (PROTONIX) 40 MG EC tablet Take 1 tablet by mouth 2 (Two) Times a Day.   Yes Zoraida Avalos MD   potassium chloride 10 MEQ CR tablet Take 2 tablets by mouth Daily. 12/19/24  Yes Osman Patrick DO   ranolazine (RANEXA) 500 MG 12 hr tablet Take 1 tablet by mouth 2 (Two) Times a Day. 12/19/24  Yes Osman Patrick DO   sacubitril-valsartan (Entresto) 24-26 MG tablet Take 1 tablet by mouth 2 (Two) Times a Day. 2/23/25  Yes Osman Patrick DO   spironolactone (ALDACTONE) 25 MG tablet Take 0.5 tablets by mouth Every Other Day.  Patient taking differently: Take 0.5 tablets by mouth As Needed. 12/19/24  Yes Osman Patrick DO   spironolactone-hydrochlorothiazide (ALDACTAZIDE) 25-25 MG tablet Take 1 tablet by mouth Daily.   Yes Zoraida Avalos MD   topiramate (TOPAMAX) 50 MG tablet Take 1 tablet by mouth Daily.   Yes Zoraida Avalos MD   zolpidem (AMBIEN) 10 MG tablet Take 1 tablet by mouth At Night As Needed for Sleep.   Yes Zoraida Avalos MD   albuterol (PROVENTIL) (2.5 MG/3ML) 0.083% nebulizer solution Take 2.5 mg by nebulization Every 4 (Four) Hours As Needed for Wheezing. 11/14/24 4/28/25  "Yes Val Riggs APRN   albuterol sulfate  (90 Base) MCG/ACT inhaler Inhale 2 puffs Every 4 (Four) Hours As Needed for Wheezing.  4/28/25 Yes Zoraida Avalos MD   carvedilol (COREG) 6.25 MG tablet Take 1 tablet by mouth 2 (Two) Times a Day With Meals. 12/19/24 4/28/25 Yes Osman Patrick DO   Fluticasone-Umeclidin-Vilant (TRELEGY ELLIPTA) 200-62.5-25 MCG/ACT inhaler Inhale 1 puff Daily. 3/3/25 4/28/25 Yes Ricky Patrick DO   Fluticasone-Umeclidin-Vilant (TRELEGY ELLIPTA) 200-62.5-25 MCG/ACT inhaler Inhale 1 puff Daily. 3/3/25 4/28/25 Yes Ricky Patrick DO   FLUoxetine (PROzac) 20 MG capsule Take 2 capsules by mouth Daily.  Patient not taking: Reported on 3/3/2025    ProviderZoraida MD   methylPREDNISolone (MEDROL) 4 MG dose pack Take as directed on package instructions.  Patient not taking: Reported on 4/28/2025 2/23/25   Osman Patrick DO         Allergies:   Levaquin [levofloxacin], Morphine, Codeine, Doxycycline, Metronidazole, Naproxen, Propoxyphene, Tetanus toxoids, and Tetracyclines & related      Results Review:   Results from last 7 days   Lab Units 04/28/25  1158   SODIUM mmol/L 137   POTASSIUM mmol/L 3.6   CHLORIDE mmol/L 101   CO2 mmol/L 24.0   BUN mg/dL 8   CALCIUM mg/dL 8.8           Visit Vitals  /92   Pulse 114   Ht 152.4 cm (60\")   Wt 80.8 kg (178 lb 3.2 oz)   SpO2 96% Comment: RA   Breastfeeding No   BMI 34.80 kg/m²           Social History:     Social History     Socioeconomic History   • Marital status: Single   Tobacco Use   • Smoking status: Every Day     Current packs/day: 1.00     Average packs/day: 1 pack/day for 51.0 years (51.0 ttl pk-yrs)     Types: Cigarettes     Passive exposure: Current   • Smokeless tobacco: Never   • Tobacco comments:     Smoking a pack of cigarettes. Reported 3/3/25   Vaping Use   • Vaping status: Never Used   Substance and Sexual Activity   • Alcohol use: Not Currently   • Drug use: Not " Currently   • Sexual activity: Defer                Ricky Patrick DO  Pulmonary/Critical Care  4/28/2025  13:01 CDT

## 2025-04-30 ENCOUNTER — OFFICE VISIT (OUTPATIENT)
Dept: CARDIOLOGY | Facility: CLINIC | Age: 60
End: 2025-04-30
Payer: COMMERCIAL

## 2025-04-30 VITALS
HEIGHT: 60 IN | SYSTOLIC BLOOD PRESSURE: 134 MMHG | DIASTOLIC BLOOD PRESSURE: 80 MMHG | WEIGHT: 179 LBS | BODY MASS INDEX: 35.14 KG/M2 | OXYGEN SATURATION: 98 % | HEART RATE: 127 BPM

## 2025-04-30 DIAGNOSIS — J44.9 CHRONIC OBSTRUCTIVE PULMONARY DISEASE, UNSPECIFIED COPD TYPE: ICD-10-CM

## 2025-04-30 DIAGNOSIS — I25.118 CORONARY ARTERY DISEASE OF NATIVE ARTERY OF NATIVE HEART WITH STABLE ANGINA PECTORIS: ICD-10-CM

## 2025-04-30 DIAGNOSIS — I50.32 CHRONIC DIASTOLIC CHF (CONGESTIVE HEART FAILURE): Primary | ICD-10-CM

## 2025-04-30 DIAGNOSIS — F17.200 TOBACCO DEPENDENCE: ICD-10-CM

## 2025-04-30 DIAGNOSIS — I20.89 STABLE ANGINA PECTORIS: ICD-10-CM

## 2025-04-30 RX ORDER — POTASSIUM CHLORIDE 750 MG/1
10 TABLET, EXTENDED RELEASE ORAL DAILY
OUTPATIENT
Start: 2025-04-30
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
## 2025-05-06 ENCOUNTER — HOSPITAL ENCOUNTER (OUTPATIENT)
Dept: CARDIOLOGY | Facility: HOSPITAL | Age: 60
Discharge: HOME | End: 2025-05-06
Payer: COMMERCIAL

## 2025-05-06 ENCOUNTER — HOSPITAL ENCOUNTER (OUTPATIENT)
Facility: HOSPITAL | Age: 60
Discharge: HOME | End: 2025-05-07
Attending: EMERGENCY MEDICINE | Admitting: EMERGENCY MEDICINE
Payer: COMMERCIAL

## 2025-05-06 VITALS
SYSTOLIC BLOOD PRESSURE: 134 MMHG | BODY MASS INDEX: 35.14 KG/M2 | WEIGHT: 179 LBS | DIASTOLIC BLOOD PRESSURE: 80 MMHG | HEIGHT: 60 IN

## 2025-05-06 DIAGNOSIS — I50.32 CHRONIC DIASTOLIC CHF (CONGESTIVE HEART FAILURE): ICD-10-CM

## 2025-05-06 DIAGNOSIS — F17.200 TOBACCO DEPENDENCE: ICD-10-CM

## 2025-05-06 DIAGNOSIS — I25.118 CORONARY ARTERY DISEASE OF NATIVE ARTERY OF NATIVE HEART WITH STABLE ANGINA PECTORIS: ICD-10-CM

## 2025-05-06 PROBLEM — I25.10 CAD S/P PERCUTANEOUS CORONARY ANGIOPLASTY: Status: ACTIVE | Noted: 2025-05-06

## 2025-05-06 PROBLEM — Z98.61 CAD S/P PERCUTANEOUS CORONARY ANGIOPLASTY: Status: ACTIVE | Noted: 2025-05-06

## 2025-05-06 LAB
ANION GAP SERPL CALCULATED.3IONS-SCNC: 9 MMOL/L (ref 5–15)
ANISOCYTOSIS BLD QL: ABNORMAL
AORTIC DIMENSIONLESS INDEX: 1.02 (DI)
AV MEAN PRESS GRAD SYS DOP V1V2: 3.9 MMHG
AV VMAX SYS DOP: 133.9 CM/SEC
BASOPHILS # BLD MANUAL: 0.11 10*3/MM3 (ref 0–0.2)
BASOPHILS NFR BLD MANUAL: 1 % (ref 0–1.5)
BH CV ECHO MEAS - AO MAX PG: 7.2 MMHG
BH CV ECHO MEAS - AO ROOT DIAM: 2.7 CM
BH CV ECHO MEAS - AO V2 VTI: 27.8 CM
BH CV ECHO MEAS - AVA(I,D): 3.1 CM2
BH CV ECHO MEAS - EDV(MOD-SP4): 65.2 ML
BH CV ECHO MEAS - EF(MOD-SP4): 56.6 %
BH CV ECHO MEAS - ESV(MOD-SP4): 28.3 ML
BH CV ECHO MEAS - LA DIMENSION: 3.9 CM
BH CV ECHO MEAS - LV DIASTOLIC VOL/BSA (35-75): 36.6 CM2
BH CV ECHO MEAS - LV MAX PG: 5.8 MMHG
BH CV ECHO MEAS - LV MEAN PG: 3.1 MMHG
BH CV ECHO MEAS - LV SYSTOLIC VOL/BSA (12-30): 15.9 CM2
BH CV ECHO MEAS - LV V1 MAX: 119.9 CM/SEC
BH CV ECHO MEAS - LV V1 VTI: 28.2 CM
BH CV ECHO MEAS - LVOT AREA: 3.1 CM2
BH CV ECHO MEAS - LVOT DIAM: 1.97 CM
BH CV ECHO MEAS - MV A MAX VEL: 107.2 CM/SEC
BH CV ECHO MEAS - MV DEC SLOPE: 524.6 CM/SEC2
BH CV ECHO MEAS - MV DEC TIME: 0.17 SEC
BH CV ECHO MEAS - MV E MAX VEL: 91.5 CM/SEC
BH CV ECHO MEAS - MV E/A: 0.85
BH CV ECHO MEAS - SV(LVOT): 86.2 ML
BH CV ECHO MEAS - SV(MOD-SP4): 36.9 ML
BH CV ECHO MEAS - SVI(LVOT): 48.4 ML/M2
BH CV ECHO MEAS - SVI(MOD-SP4): 20.7 ML/M2
BUN SERPL-MCNC: 8 MG/DL (ref 6–20)
BUN/CREAT SERPL: 12.7 (ref 7–25)
CALCIUM SPEC-SCNC: 8.7 MG/DL (ref 8.6–10.5)
CHLORIDE SERPL-SCNC: 106 MMOL/L (ref 98–107)
CO2 SERPL-SCNC: 25 MMOL/L (ref 22–29)
CREAT SERPL-MCNC: 0.63 MG/DL (ref 0.57–1)
DEPRECATED RDW RBC AUTO: 49.1 FL (ref 37–54)
EGFRCR SERPLBLD CKD-EPI 2021: 103.6 ML/MIN/1.73
EOSINOPHIL # BLD MANUAL: 0.42 10*3/MM3 (ref 0–0.4)
EOSINOPHIL NFR BLD MANUAL: 4 % (ref 0.3–6.2)
ERYTHROCYTE [DISTWIDTH] IN BLOOD BY AUTOMATED COUNT: 18.9 % (ref 12.3–15.4)
GLUCOSE SERPL-MCNC: 119 MG/DL (ref 65–99)
HCT VFR BLD AUTO: 30.9 % (ref 34–46.6)
HGB BLD-MCNC: 8.7 G/DL (ref 12–15.9)
INR PPP: 0.9 (ref 0.91–1.09)
LYMPHOCYTES # BLD MANUAL: 4.07 10*3/MM3 (ref 0.7–3.1)
LYMPHOCYTES NFR BLD MANUAL: 7.1 % (ref 5–12)
MCH RBC QN AUTO: 20.6 PG (ref 26.6–33)
MCHC RBC AUTO-ENTMCNC: 28.2 G/DL (ref 31.5–35.7)
MCV RBC AUTO: 73.2 FL (ref 79–97)
MICROCYTES BLD QL: ABNORMAL
MONOCYTES # BLD: 0.75 10*3/MM3 (ref 0.1–0.9)
NEUTROPHILS # BLD AUTO: 5.24 10*3/MM3 (ref 1.7–7)
NEUTROPHILS NFR BLD MANUAL: 48.5 % (ref 42.7–76)
NEUTS BAND NFR BLD MANUAL: 1 % (ref 0–5)
NEUTS VAC BLD QL SMEAR: ABNORMAL
PLAT MORPH BLD: NORMAL
PLATELET # BLD AUTO: 349 10*3/MM3 (ref 140–450)
PMV BLD AUTO: 10.1 FL (ref 6–12)
POIKILOCYTOSIS BLD QL SMEAR: ABNORMAL
POLYCHROMASIA BLD QL SMEAR: ABNORMAL
POTASSIUM SERPL-SCNC: 3.9 MMOL/L (ref 3.5–5.2)
PROTHROMBIN TIME: 12.6 SECONDS (ref 11.8–14.8)
RBC # BLD AUTO: 4.22 10*6/MM3 (ref 3.77–5.28)
ROULEAUX BLD QL SMEAR: ABNORMAL
SODIUM SERPL-SCNC: 140 MMOL/L (ref 136–145)
VARIANT LYMPHS NFR BLD MANUAL: 38.4 % (ref 19.6–45.3)
WBC NRBC COR # BLD AUTO: 10.59 10*3/MM3 (ref 3.4–10.8)

## 2025-05-06 PROCEDURE — C1725 CATH, TRANSLUMIN NON-LASER: HCPCS | Performed by: EMERGENCY MEDICINE

## 2025-05-06 PROCEDURE — 25010000002 BIVALIRUDIN TRIFLUOROACETATE 250 MG RECONSTITUTED SOLUTION 1 EACH VIAL: Performed by: EMERGENCY MEDICINE

## 2025-05-06 PROCEDURE — G0378 HOSPITAL OBSERVATION PER HR: HCPCS

## 2025-05-06 PROCEDURE — C1769 GUIDE WIRE: HCPCS | Performed by: EMERGENCY MEDICINE

## 2025-05-06 PROCEDURE — 93321 DOPPLER ECHO F-UP/LMTD STD: CPT | Performed by: EMERGENCY MEDICINE

## 2025-05-06 PROCEDURE — 93308 TTE F-UP OR LMTD: CPT

## 2025-05-06 PROCEDURE — C9610: HCPCS | Performed by: EMERGENCY MEDICINE

## 2025-05-06 PROCEDURE — 85007 BL SMEAR W/DIFF WBC COUNT: CPT | Performed by: EMERGENCY MEDICINE

## 2025-05-06 PROCEDURE — 0913T PRQ TCAT THER RX NTRAC BALO1: CPT | Performed by: EMERGENCY MEDICINE

## 2025-05-06 PROCEDURE — 93325 DOPPLER ECHO COLOR FLOW MAPG: CPT | Performed by: EMERGENCY MEDICINE

## 2025-05-06 PROCEDURE — C1760 CLOSURE DEV, VASC: HCPCS | Performed by: EMERGENCY MEDICINE

## 2025-05-06 PROCEDURE — 85025 COMPLETE CBC W/AUTO DIFF WBC: CPT | Performed by: EMERGENCY MEDICINE

## 2025-05-06 PROCEDURE — 80048 BASIC METABOLIC PNL TOTAL CA: CPT | Performed by: EMERGENCY MEDICINE

## 2025-05-06 PROCEDURE — 99152 MOD SED SAME PHYS/QHP 5/>YRS: CPT | Performed by: EMERGENCY MEDICINE

## 2025-05-06 PROCEDURE — C1887 CATHETER, GUIDING: HCPCS | Performed by: EMERGENCY MEDICINE

## 2025-05-06 PROCEDURE — C1894 INTRO/SHEATH, NON-LASER: HCPCS | Performed by: EMERGENCY MEDICINE

## 2025-05-06 PROCEDURE — 99153 MOD SED SAME PHYS/QHP EA: CPT | Performed by: EMERGENCY MEDICINE

## 2025-05-06 PROCEDURE — 25010000002 FENTANYL CITRATE (PF) 50 MCG/ML SOLUTION: Performed by: EMERGENCY MEDICINE

## 2025-05-06 PROCEDURE — 93321 DOPPLER ECHO F-UP/LMTD STD: CPT

## 2025-05-06 PROCEDURE — 25010000002 MIDAZOLAM HCL (PF) 5 MG/5ML SOLUTION: Performed by: EMERGENCY MEDICINE

## 2025-05-06 PROCEDURE — 25010000002 LORAZEPAM PER 2 MG: Performed by: EMERGENCY MEDICINE

## 2025-05-06 PROCEDURE — 25010000002 DIPHENHYDRAMINE PER 50 MG: Performed by: EMERGENCY MEDICINE

## 2025-05-06 PROCEDURE — 25510000001 IOPAMIDOL PER 1 ML: Performed by: EMERGENCY MEDICINE

## 2025-05-06 PROCEDURE — 94799 UNLISTED PULMONARY SVC/PX: CPT

## 2025-05-06 PROCEDURE — 93458 L HRT ARTERY/VENTRICLE ANGIO: CPT | Performed by: EMERGENCY MEDICINE

## 2025-05-06 PROCEDURE — 25810000003 SODIUM CHLORIDE 0.9 % SOLUTION: Performed by: EMERGENCY MEDICINE

## 2025-05-06 PROCEDURE — 25010000002 HEPARIN (PORCINE) 2000-0.9 UNIT/L-% SOLUTION: Performed by: EMERGENCY MEDICINE

## 2025-05-06 PROCEDURE — S0260 H&P FOR SURGERY: HCPCS | Performed by: EMERGENCY MEDICINE

## 2025-05-06 PROCEDURE — 25010000002 HEPARIN (PORCINE) 1000-0.9 UT/500ML-% SOLUTION: Performed by: EMERGENCY MEDICINE

## 2025-05-06 PROCEDURE — 93325 DOPPLER ECHO COLOR FLOW MAPG: CPT

## 2025-05-06 PROCEDURE — 25010000002 LIDOCAINE 2% SOLUTION: Performed by: EMERGENCY MEDICINE

## 2025-05-06 PROCEDURE — 93308 TTE F-UP OR LMTD: CPT | Performed by: EMERGENCY MEDICINE

## 2025-05-06 PROCEDURE — 85610 PROTHROMBIN TIME: CPT | Performed by: EMERGENCY MEDICINE

## 2025-05-06 RX ORDER — NITROGLYCERIN 0.4 MG/1
0.4 TABLET SUBLINGUAL
Status: DISCONTINUED | OUTPATIENT
Start: 2025-05-06 | End: 2025-05-07 | Stop reason: HOSPADM

## 2025-05-06 RX ORDER — BUPROPION HYDROCHLORIDE 150 MG/1
150 TABLET, EXTENDED RELEASE ORAL 2 TIMES DAILY
Status: DISCONTINUED | OUTPATIENT
Start: 2025-05-06 | End: 2025-05-07 | Stop reason: HOSPADM

## 2025-05-06 RX ORDER — CLOPIDOGREL BISULFATE 75 MG/1
75 TABLET ORAL DAILY
Status: DISCONTINUED | OUTPATIENT
Start: 2025-05-06 | End: 2025-05-06

## 2025-05-06 RX ORDER — SODIUM CHLORIDE 0.9 % (FLUSH) 0.9 %
10 SYRINGE (ML) INJECTION AS NEEDED
Status: DISCONTINUED | OUTPATIENT
Start: 2025-05-06 | End: 2025-05-07 | Stop reason: HOSPADM

## 2025-05-06 RX ORDER — HYDROCODONE BITARTRATE AND ACETAMINOPHEN 10; 325 MG/1; MG/1
1 TABLET ORAL 4 TIMES DAILY
Refills: 0 | Status: DISCONTINUED | OUTPATIENT
Start: 2025-05-06 | End: 2025-05-07 | Stop reason: HOSPADM

## 2025-05-06 RX ORDER — CARVEDILOL 6.25 MG/1
6.25 TABLET ORAL 2 TIMES DAILY WITH MEALS
Status: DISCONTINUED | OUTPATIENT
Start: 2025-05-06 | End: 2025-05-07 | Stop reason: HOSPADM

## 2025-05-06 RX ORDER — LORAZEPAM 2 MG/ML
INJECTION INTRAMUSCULAR
Status: DISCONTINUED
Start: 2025-05-06 | End: 2025-05-06 | Stop reason: WASHOUT

## 2025-05-06 RX ORDER — LIDOCAINE HYDROCHLORIDE 20 MG/ML
5 SOLUTION OROPHARYNGEAL
Status: DISCONTINUED | OUTPATIENT
Start: 2025-05-06 | End: 2025-05-07 | Stop reason: HOSPADM

## 2025-05-06 RX ORDER — IOPAMIDOL 755 MG/ML
INJECTION, SOLUTION INTRAVASCULAR
Status: DISCONTINUED | OUTPATIENT
Start: 2025-05-06 | End: 2025-05-06 | Stop reason: HOSPADM

## 2025-05-06 RX ORDER — ARFORMOTEROL TARTRATE 15 UG/2ML
15 SOLUTION RESPIRATORY (INHALATION)
Status: DISCONTINUED | OUTPATIENT
Start: 2025-05-06 | End: 2025-05-06

## 2025-05-06 RX ORDER — DIPHENHYDRAMINE HYDROCHLORIDE 50 MG/ML
INJECTION, SOLUTION INTRAMUSCULAR; INTRAVENOUS
Status: DISCONTINUED | OUTPATIENT
Start: 2025-05-06 | End: 2025-05-06 | Stop reason: HOSPADM

## 2025-05-06 RX ORDER — ALBUTEROL SULFATE 0.83 MG/ML
2.5 SOLUTION RESPIRATORY (INHALATION) EVERY 4 HOURS PRN
Status: DISCONTINUED | OUTPATIENT
Start: 2025-05-06 | End: 2025-05-07 | Stop reason: HOSPADM

## 2025-05-06 RX ORDER — ONDANSETRON 4 MG/1
4 TABLET, ORALLY DISINTEGRATING ORAL EVERY 6 HOURS PRN
Status: DISCONTINUED | OUTPATIENT
Start: 2025-05-06 | End: 2025-05-07 | Stop reason: HOSPADM

## 2025-05-06 RX ORDER — NITROGLYCERIN 0.4 MG/1
0.4 TABLET SUBLINGUAL
Status: DISCONTINUED | OUTPATIENT
Start: 2025-05-06 | End: 2025-05-06

## 2025-05-06 RX ORDER — BUMETANIDE 2 MG/1
2 TABLET ORAL 2 TIMES DAILY
Status: DISCONTINUED | OUTPATIENT
Start: 2025-05-06 | End: 2025-05-07 | Stop reason: HOSPADM

## 2025-05-06 RX ORDER — MIDAZOLAM HYDROCHLORIDE 5 MG/5ML
INJECTION, SOLUTION INTRAMUSCULAR; INTRAVENOUS
Status: DISCONTINUED | OUTPATIENT
Start: 2025-05-06 | End: 2025-05-06 | Stop reason: HOSPADM

## 2025-05-06 RX ORDER — TOPIRAMATE 25 MG/1
50 TABLET, FILM COATED ORAL DAILY
Status: DISCONTINUED | OUTPATIENT
Start: 2025-05-06 | End: 2025-05-07 | Stop reason: HOSPADM

## 2025-05-06 RX ORDER — LORAZEPAM 2 MG/ML
1 INJECTION INTRAMUSCULAR ONCE
Status: COMPLETED | OUTPATIENT
Start: 2025-05-06 | End: 2025-05-06

## 2025-05-06 RX ORDER — HEPARIN SODIUM 200 [USP'U]/100ML
INJECTION, SOLUTION INTRAVENOUS
Status: DISCONTINUED | OUTPATIENT
Start: 2025-05-06 | End: 2025-05-06 | Stop reason: HOSPADM

## 2025-05-06 RX ORDER — FENTANYL CITRATE 50 UG/ML
INJECTION, SOLUTION INTRAMUSCULAR; INTRAVENOUS
Status: DISCONTINUED | OUTPATIENT
Start: 2025-05-06 | End: 2025-05-06 | Stop reason: HOSPADM

## 2025-05-06 RX ORDER — ONDANSETRON 2 MG/ML
4 INJECTION INTRAMUSCULAR; INTRAVENOUS EVERY 6 HOURS PRN
Status: DISCONTINUED | OUTPATIENT
Start: 2025-05-06 | End: 2025-05-07 | Stop reason: HOSPADM

## 2025-05-06 RX ORDER — ACETAMINOPHEN 325 MG/1
650 TABLET ORAL EVERY 4 HOURS PRN
Status: DISCONTINUED | OUTPATIENT
Start: 2025-05-06 | End: 2025-05-07 | Stop reason: HOSPADM

## 2025-05-06 RX ORDER — CLOPIDOGREL BISULFATE 75 MG/1
75 TABLET ORAL DAILY
Status: DISCONTINUED | OUTPATIENT
Start: 2025-05-07 | End: 2025-05-07 | Stop reason: HOSPADM

## 2025-05-06 RX ORDER — BUDESONIDE 0.5 MG/2ML
0.5 INHALANT ORAL
Status: DISCONTINUED | OUTPATIENT
Start: 2025-05-06 | End: 2025-05-06

## 2025-05-06 RX ORDER — LORAZEPAM 2 MG/ML
INJECTION INTRAMUSCULAR
Status: DISPENSED
Start: 2025-05-06 | End: 2025-05-06

## 2025-05-06 RX ORDER — PANTOPRAZOLE SODIUM 40 MG/1
40 TABLET, DELAYED RELEASE ORAL
Status: DISCONTINUED | OUTPATIENT
Start: 2025-05-06 | End: 2025-05-07 | Stop reason: HOSPADM

## 2025-05-06 RX ORDER — RANOLAZINE 500 MG/1
500 TABLET, EXTENDED RELEASE ORAL 2 TIMES DAILY
Status: DISCONTINUED | OUTPATIENT
Start: 2025-05-06 | End: 2025-05-07 | Stop reason: HOSPADM

## 2025-05-06 RX ORDER — LIDOCAINE HYDROCHLORIDE 20 MG/ML
INJECTION, SOLUTION INFILTRATION; PERINEURAL
Status: DISCONTINUED | OUTPATIENT
Start: 2025-05-06 | End: 2025-05-06 | Stop reason: HOSPADM

## 2025-05-06 RX ORDER — CLOPIDOGREL BISULFATE 75 MG/1
TABLET ORAL
Status: DISCONTINUED | OUTPATIENT
Start: 2025-05-06 | End: 2025-05-06 | Stop reason: HOSPADM

## 2025-05-06 RX ORDER — GABAPENTIN 300 MG/1
600 CAPSULE ORAL EVERY 8 HOURS SCHEDULED
Status: DISCONTINUED | OUTPATIENT
Start: 2025-05-06 | End: 2025-05-07 | Stop reason: HOSPADM

## 2025-05-06 RX ORDER — BUSPIRONE HYDROCHLORIDE 5 MG/1
5 TABLET ORAL 2 TIMES DAILY
Status: DISCONTINUED | OUTPATIENT
Start: 2025-05-06 | End: 2025-05-07 | Stop reason: HOSPADM

## 2025-05-06 RX ORDER — ARFORMOTEROL TARTRATE 15 UG/2ML
15 SOLUTION RESPIRATORY (INHALATION)
Status: DISCONTINUED | OUTPATIENT
Start: 2025-05-06 | End: 2025-05-07 | Stop reason: HOSPADM

## 2025-05-06 RX ORDER — SODIUM CHLORIDE 9 MG/ML
100 INJECTION, SOLUTION INTRAVENOUS CONTINUOUS
Status: DISPENSED | OUTPATIENT
Start: 2025-05-06 | End: 2025-05-06

## 2025-05-06 RX ORDER — ASPIRIN 81 MG/1
81 TABLET ORAL DAILY
Status: DISCONTINUED | OUTPATIENT
Start: 2025-05-06 | End: 2025-05-07 | Stop reason: HOSPADM

## 2025-05-06 RX ORDER — SODIUM CHLORIDE 9 MG/ML
1 INJECTION, SOLUTION INTRAVENOUS CONTINUOUS
Status: DISPENSED | OUTPATIENT
Start: 2025-05-06 | End: 2025-05-06

## 2025-05-06 RX ORDER — ATORVASTATIN CALCIUM 40 MG/1
80 TABLET, FILM COATED ORAL NIGHTLY
Status: DISCONTINUED | OUTPATIENT
Start: 2025-05-06 | End: 2025-05-07 | Stop reason: HOSPADM

## 2025-05-06 RX ORDER — ISOSORBIDE MONONITRATE 30 MG/1
30 TABLET, EXTENDED RELEASE ORAL DAILY
Status: DISCONTINUED | OUTPATIENT
Start: 2025-05-06 | End: 2025-05-07 | Stop reason: HOSPADM

## 2025-05-06 RX ORDER — ZOLPIDEM TARTRATE 5 MG/1
10 TABLET ORAL NIGHTLY PRN
Status: DISCONTINUED | OUTPATIENT
Start: 2025-05-06 | End: 2025-05-07 | Stop reason: HOSPADM

## 2025-05-06 RX ORDER — SODIUM CHLORIDE 0.9 % (FLUSH) 0.9 %
10 SYRINGE (ML) INJECTION EVERY 12 HOURS SCHEDULED
Status: DISCONTINUED | OUTPATIENT
Start: 2025-05-06 | End: 2025-05-07 | Stop reason: HOSPADM

## 2025-05-06 RX ORDER — BUDESONIDE 0.5 MG/2ML
0.5 INHALANT ORAL
Status: DISCONTINUED | OUTPATIENT
Start: 2025-05-06 | End: 2025-05-07 | Stop reason: HOSPADM

## 2025-05-06 RX ADMIN — HYDROCODONE BITARTRATE AND ACETAMINOPHEN 1 TABLET: 10; 325 TABLET ORAL at 20:58

## 2025-05-06 RX ADMIN — PANTOPRAZOLE SODIUM 40 MG: 40 TABLET, DELAYED RELEASE ORAL at 17:08

## 2025-05-06 RX ADMIN — ZOLPIDEM TARTRATE 10 MG: 5 TABLET, FILM COATED ORAL at 21:09

## 2025-05-06 RX ADMIN — RANOLAZINE 500 MG: 500 TABLET, FILM COATED, EXTENDED RELEASE ORAL at 17:08

## 2025-05-06 RX ADMIN — Medication 10 ML: at 20:58

## 2025-05-06 RX ADMIN — GABAPENTIN 600 MG: 300 CAPSULE ORAL at 21:00

## 2025-05-06 RX ADMIN — LORAZEPAM 1 MG: 2 INJECTION INTRAMUSCULAR; INTRAVENOUS at 09:51

## 2025-05-06 RX ADMIN — SODIUM CHLORIDE 1 ML/KG/HR: 9 INJECTION, SOLUTION INTRAVENOUS at 14:15

## 2025-05-06 RX ADMIN — IPRATROPIUM BROMIDE 0.5 MG: 0.5 SOLUTION RESPIRATORY (INHALATION) at 15:03

## 2025-05-06 RX ADMIN — SACUBITRIL AND VALSARTAN 1 TABLET: 24; 26 TABLET, FILM COATED ORAL at 17:10

## 2025-05-06 RX ADMIN — HYDROCODONE BITARTRATE AND ACETAMINOPHEN 1 TABLET: 10; 325 TABLET ORAL at 17:07

## 2025-05-06 RX ADMIN — CARVEDILOL 6.25 MG: 6.25 TABLET, FILM COATED ORAL at 17:07

## 2025-05-06 NOTE — Clinical Note
Second inflation of balloon - Max pressure = 18 tere. 2nd Inflation of balloon - Duration = 20 seconds.

## 2025-05-06 NOTE — NURSING NOTE
DINESH Virgen, helped me change the patient in the bed. Pt voided in bed. I asked her before changing and she states its ok that I change her. Also let her know that she is being admitted and going to room 454.

## 2025-05-06 NOTE — Clinical Note
Third inflation of balloon - Max pressure = 18 tere. 3rd Inflation of balloon - Duration = 15 seconds.

## 2025-05-06 NOTE — PLAN OF CARE
Goal Outcome Evaluation:              Outcome Evaluation: AO4. pt declined most of meds upon arrival. see notes from today. all needs currently met.                              full range of motion in all extremities
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
## 2025-05-06 NOTE — Clinical Note
The right DP pulse is +2. The right PT pulse is +2. Azathioprine Pregnancy And Lactation Text: This medication is Pregnancy Category D and isn't considered safe during pregnancy. It is unknown if this medication is excreted in breast milk.

## 2025-05-06 NOTE — Clinical Note
Hemostasis started on the right femoral artery. Angio-Seal was used in achieving hemostasis. Closure device deployed in the vessel. Hemostasis achieved successfully. Speaking Coherently

## 2025-05-06 NOTE — Clinical Note
A 5 fr sheath was successfully inserted with ultrasound guidance into the right femoral artery. 5F used to dilate

## 2025-05-06 NOTE — NURSING NOTE
Patient observed to be actively drinking sweet tea in room upon entering. Advised to stop as NPO prior to procedure is needed so aspiration does not occur. Patient became angry and verbalized having multiple procedures and being very knowledgeable with pre procedural preparation. While trying to get 2 person identifiers patient verbalized our lack of communication because everyone kept asking the same questions. Tried to explain why we do this but patient refused to acknowledge the conversation and said that staff here don't know what were doing. Patient also said no one told her she couldn't drink. Said that she also didn't want to be woken up during the procedure like the last time and began using profanity. I explained moderate sedation and its purpose and patient continued swearing stating that the last time she had a stent put in and it was painful.Patient teaching undertaken at this time. Patient taken to lab and MO made aware of patient drinking sweet tea.MO verbalized it was OK to proceed but charge nurse said that per protocol patient would have be NPO for at least 2 hrs before procedure. Teaching undertaken by charge nurse and patient taken back to COU.
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
## 2025-05-06 NOTE — NURSING NOTE
"Told patient I was hooking her up to fluids again and patient states \"you wont let me have tea, but you're giving me fluids\". I explained the importance of having fluids prior to heart cath. DINESH Virgen at bedside as witness.   "

## 2025-05-06 NOTE — Clinical Note
Fourth inflation of balloon - Max pressure = 18 tere. 4th Inflation of balloon - Duration = 15 seconds.

## 2025-05-06 NOTE — NURSING NOTE
Pt returned from cath lab without having procedure due to patient drinking tea. Pt refused vitals. Pt states she would like to go outside and smoke. Jeff educated patient that smoking isn't advised before a heart cath. Due to her stating she wanted to go smoke and smoking having to be off campus, I removed her IV. After talking with Jeff, patient decided she wanted to stay for procedure later today. New IV was started.

## 2025-05-06 NOTE — NURSING NOTE
"Pt questioned dosing on Ativan. Stated \"I want to see.\" And \"you're only giving 0.5, he said 1mg.\" Explained to her that 0.5mL of the drug equated to 1mg because it was a 2mg vial in 1mL. Asked Janeth and Jeff to help with witnessing dosage and explanation to patient.   "

## 2025-05-06 NOTE — OP NOTE
"  [facility]  Date of procedure: 5/6/2025     Procedures performed:     1.  Access to the right femoral artery via modified Seldinger technique and ultrasound guidance  2.  Left heart catheterization with retrograde crossing Revonto left ventricle pressure recorded  3.  LV ventriculography  4.  Selective bilateral coronary angiography  5.  Conscious sedation with continuous hemodynamic monitoring for 40 minutes  6.  Patent but states achieved in the right femoral artery with a 6 Swedish Angio-Seal closure device  7.  Successful noncompliant PTCA to the left circumflex with an NC Emerge MR 2.5 x 15 mm balloon times multiple inflations  8.  Successful noncompliant PTCA with a NC Emerge MR 3.5 by millimeter balloon  9.  Successful drug-coated balloon to the left circumflex in-stent stenosis with an agent 3 x 30 mm balloon  10.  Successful drug-coated balloon to the left circumflex with an agent 3.5 x 20 mm balloon    Risk, Benefits, and Alternatives discussed with the patient and/or family.  Plan is for moderate sedation and the patient agrees to proceed with the procedure.  An immediate assessment was done prior to the administration of moderate sedation     Indication: Unstable angina with symptoms the exact same as previously when she had intervention performed  Premedication: Versed, fentanyl  Contrast: Isovue 125 cc  Radiation: Flouro time= 4.1 minutes. Air Kerma= 563 mGy  Catheters: 6Fr JL4, 6Fr JR4, 6Fr angled pigtail  Guiding catheter: XB 3.5  PCI Hardware: .014\" whisper extra-support wire, balloons as outlined above      Procedural details:    The patient was brought to the cath lab and prepped and draped in the usual fashion.  Access was obtained in the right femoral artery via modified Seldinger technique and ultrasound guidance.  A 6 Swedish sheath was placed into the artery and flushed.  A JL 4 catheter was inserted and used to engage the left main selective left coronary angiography " performed in multiple views.  A JR4 catheter was then inserted and used to engage the right and selective right coronary angiography performed in multiple views.  Pigtail catheter was then inserted used to cross aortic valve to the left ventricle pressure recorded LV ventriculography was performed.  Catheter was then pulled back across the aortic valve and again pressures were recorded.  Next an XB 3.5 catheter was inserted and used to engage the left main.  A whisper export wire was inserted and advanced into the distal left circumflex.  We then inserted an NC Emerge MR 2.5 x 15 mm balloon into the left circumflex where it was inflated multiple times.  We then inserted an NC Emerge MR 3.5 x 8 mm balloon into the left circumflex where it was inflated.  Next, an agent 3 x 30 mm drug-coated balloon was inserted into the left circumflex where it was inflated to 14 tere for 2 minutes.  Finally, an agent 3.5 x 20 mm drug-coated balloon was inserted into the left circumflex where it was inflated to 12 tere for 120 seconds.  Postintervention angiography formed in multiple views.  Everything was then withdrawn to the sheath and the sheath was flushed.  Patent hemostasis was achieved in the right femoral artery access site using a 6 Malay Angio-Seal closure device.  Patient tolerated procedure well without any complications.  She left the Cath Lab in stable condition.        I supervised the administration of conscious sedation by nursing staff throughout the case.  First dose was given at 1207 and the end of my face-to-face encounter was at 1247, accounting for a total of 40 minutes of supervision.  During the case, continuous pulse oximetry, heart rate, blood pressure, and patient status were monitored.     Findings:    Hemodynamics:    Aortic: 141/75 mmHg  LV: 138/1 mmHg  LVEDP: 18 mmHg    Left ventriculography:  1. The contractility of the left ventricle is normal.  Estimated ejection fraction 55%.  2.  No significant  gradient across aortic valve on pullback    Selective coronary angiography:     Left main: Large-caliber vessel that bifurcates into the LAD and left circumflex, no angiographic evidence of stenosis, ALEXY-3 flow    LAD: LAD is a large-caliber vessel with a patent stent in the midsegment, ALEXY-3 flow    Diagonals: 1 moderate caliber diagonal vessel with mild disease, remaining diagonals are small caliber    Left circumflex: Large caliber vessel with stent present in the mid and distal segment with no significant in-stent stenosis, ALEXY-3 flow    Obtuse marginals: The first obtuse marginal small caliber, second obtuse marginal is moderate caliber with no significant disease, the third obtuse marginal is moderate caliber with stents present in the proximal and mid segment with 80 to 90% in-stent stenosis in the proximal segment, the mid segment has 70 to 80% in-stent stenosis, ALEXY-3 flow.  Status post successful noncompliant PTCA and drug-coated PTCA we had continuation ALEXY-3 flow and 0% residual stenosis remaining within the stents.    RONNY: Small caliber    RCA: Moderate caliber vessel with no significant disease, ALEXY-3 flow    PDA is small caliber     Estimated Blood Loss: 50 cc    Specimens: None    Complications: None       Impression:  1.  Coronary artery disease as described above with 80 to 90% in-stent stenosis in the proximal third obtuse marginal and 7080% in-stent stenosis in the mid third obtuse marginal branch status post successful  noncompliant PTCA and drug-coated PTCA with continuation ALEXY-3 flow and 0% residual stenosis remaining     Plan:   1.  Continue on dual antiplatelet therapy with aspirin and Plavix  2.  Continue on high intensity Lipitor 80  3.  Continue on current dose of Aldactone, Bumex, Coreg, Imdur, Ranexa  4.  Follow-up in the [facility] cardiology clinic in 3 months    Osman Patrick DO  Interventional cardiology  [facility]  May 6, 2025

## 2025-05-07 VITALS
SYSTOLIC BLOOD PRESSURE: 151 MMHG | RESPIRATION RATE: 18 BRPM | TEMPERATURE: 98.4 F | HEIGHT: 60 IN | WEIGHT: 178.4 LBS | DIASTOLIC BLOOD PRESSURE: 68 MMHG | HEART RATE: 88 BPM | BODY MASS INDEX: 35.02 KG/M2 | OXYGEN SATURATION: 96 %

## 2025-05-07 DIAGNOSIS — Z98.61 S/P PTCA (PERCUTANEOUS TRANSLUMINAL CORONARY ANGIOPLASTY): Primary | ICD-10-CM

## 2025-05-07 LAB
ANION GAP SERPL CALCULATED.3IONS-SCNC: 9 MMOL/L (ref 5–15)
BUN SERPL-MCNC: 7 MG/DL (ref 6–20)
BUN/CREAT SERPL: 8.8 (ref 7–25)
CALCIUM SPEC-SCNC: 7.9 MG/DL (ref 8.6–10.5)
CHLORIDE SERPL-SCNC: 110 MMOL/L (ref 98–107)
CO2 SERPL-SCNC: 22 MMOL/L (ref 22–29)
CREAT SERPL-MCNC: 0.8 MG/DL (ref 0.57–1)
DEPRECATED RDW RBC AUTO: 49.3 FL (ref 37–54)
EGFRCR SERPLBLD CKD-EPI 2021: 86.1 ML/MIN/1.73
ERYTHROCYTE [DISTWIDTH] IN BLOOD BY AUTOMATED COUNT: 19.1 % (ref 12.3–15.4)
GLUCOSE SERPL-MCNC: 125 MG/DL (ref 65–99)
HCT VFR BLD AUTO: 28.7 % (ref 34–46.6)
HGB BLD-MCNC: 8 G/DL (ref 12–15.9)
MCH RBC QN AUTO: 20.4 PG (ref 26.6–33)
MCHC RBC AUTO-ENTMCNC: 27.9 G/DL (ref 31.5–35.7)
MCV RBC AUTO: 73.2 FL (ref 79–97)
PLATELET # BLD AUTO: 266 10*3/MM3 (ref 140–450)
PMV BLD AUTO: 9.7 FL (ref 6–12)
POTASSIUM SERPL-SCNC: 4 MMOL/L (ref 3.5–5.2)
RBC # BLD AUTO: 3.92 10*6/MM3 (ref 3.77–5.28)
SODIUM SERPL-SCNC: 141 MMOL/L (ref 136–145)
WBC NRBC COR # BLD AUTO: 8.55 10*3/MM3 (ref 3.4–10.8)

## 2025-05-07 PROCEDURE — 94799 UNLISTED PULMONARY SVC/PX: CPT

## 2025-05-07 PROCEDURE — G0378 HOSPITAL OBSERVATION PER HR: HCPCS

## 2025-05-07 PROCEDURE — 93010 ELECTROCARDIOGRAM REPORT: CPT | Performed by: EMERGENCY MEDICINE

## 2025-05-07 PROCEDURE — 94664 DEMO&/EVAL PT USE INHALER: CPT

## 2025-05-07 PROCEDURE — 80048 BASIC METABOLIC PNL TOTAL CA: CPT | Performed by: EMERGENCY MEDICINE

## 2025-05-07 PROCEDURE — 85027 COMPLETE CBC AUTOMATED: CPT | Performed by: EMERGENCY MEDICINE

## 2025-05-07 PROCEDURE — 93005 ELECTROCARDIOGRAM TRACING: CPT | Performed by: EMERGENCY MEDICINE

## 2025-05-07 RX ORDER — HYDROXYZINE HYDROCHLORIDE 25 MG/1
25 TABLET, FILM COATED ORAL 3 TIMES DAILY PRN
COMMUNITY

## 2025-05-07 RX ADMIN — Medication 10 ML: at 08:30

## 2025-05-07 RX ADMIN — PANTOPRAZOLE SODIUM 40 MG: 40 TABLET, DELAYED RELEASE ORAL at 08:27

## 2025-05-07 RX ADMIN — HYDROCODONE BITARTRATE AND ACETAMINOPHEN 1 TABLET: 10; 325 TABLET ORAL at 06:14

## 2025-05-07 RX ADMIN — Medication 12.5 MG: at 08:27

## 2025-05-07 RX ADMIN — SACUBITRIL AND VALSARTAN 1 TABLET: 24; 26 TABLET, FILM COATED ORAL at 08:27

## 2025-05-07 RX ADMIN — ASPIRIN 81 MG: 81 TABLET, COATED ORAL at 08:27

## 2025-05-07 RX ADMIN — CARVEDILOL 6.25 MG: 6.25 TABLET, FILM COATED ORAL at 08:27

## 2025-05-07 RX ADMIN — BUMETANIDE 2 MG: 2 TABLET ORAL at 08:27

## 2025-05-07 RX ADMIN — RANOLAZINE 500 MG: 500 TABLET, FILM COATED, EXTENDED RELEASE ORAL at 08:27

## 2025-05-07 RX ADMIN — TOPIRAMATE 50 MG: 25 TABLET, FILM COATED ORAL at 08:27

## 2025-05-07 RX ADMIN — BUDESONIDE 0.5 MG: 0.5 INHALANT RESPIRATORY (INHALATION) at 06:06

## 2025-05-07 RX ADMIN — ARFORMOTEROL TARTRATE 15 MCG: 15 SOLUTION RESPIRATORY (INHALATION) at 06:02

## 2025-05-07 RX ADMIN — GABAPENTIN 600 MG: 300 CAPSULE ORAL at 06:14

## 2025-05-07 RX ADMIN — IPRATROPIUM BROMIDE 0.5 MG: 0.5 SOLUTION RESPIRATORY (INHALATION) at 06:05

## 2025-05-07 RX ADMIN — ISOSORBIDE MONONITRATE 30 MG: 30 TABLET, EXTENDED RELEASE ORAL at 08:27

## 2025-05-07 NOTE — PLAN OF CARE
Goal Outcome Evaluation:  Plan of Care Reviewed With: patient        Progress: improving    Problem: Adult Inpatient Plan of Care  Goal: Plan of Care Review  Outcome: Progressing  Flowsheets (Taken 5/7/2025 9921)  Progress: improving  Outcome Evaluation: AAO x 4. Difficult to deal with due to pt's noncompliance. HR Sinus 76-85. Right groin site c/d/i with dressing on. Possibly home later today.  Plan of Care Reviewed With: patient

## 2025-05-07 NOTE — NURSING NOTE
"Notified by monitor observation that one of Pt's leads were off. At the bedside, Pt stated \"Well, they've been off all fucking night and you're just now coming in here to wake me up. I don't give a shit\". Explained to pt that heart monitoring was important since she had a PTCA earlier. As RN was leaving room, Pt stated \"Fuck you all. I'll just rip them off again\".  "

## 2025-05-07 NOTE — NURSING NOTE
Pt's leads off again. Replaced one lead and explained again that monitoring was an important aspect of care.

## 2025-05-07 NOTE — PROGRESS NOTES
Patient refused all breathing treatments. Sp02 94% on room air. Patient in no distress at this time.

## 2025-05-07 NOTE — NURSING NOTE
"Pt refuses several medications. States \"I've been here all day and nobody has given me any of my home meds\". Advised Pt that I had offered her the medications that were ordered that were her home meds, and she said \"Fuck. I'll just wait and talk to  Celina\". Medications offered again, to which she refused to take.  "

## 2025-05-07 NOTE — DISCHARGE SUMMARY
"  [facility] HEART GROUP DISCHARGE    Date of Discharge:  5/7/2025    Discharge Diagnosis: ***    Presenting Problem/History of Present Illness  Coronary artery disease of native artery of native heart with stable angina pectoris [I25.118]  Tobacco dependence [F17.200]  CAD S/P percutaneous coronary angioplasty [I25.10, Z98.61]  CAD (coronary artery disease) [I25.10]    ***    Hospital Course  Patient is a 59 y.o. female presented with ***.      Procedures Performed  Procedure(s):  Left Heart Cath       Consults:   Consults       No orders found for last 30 day(s).            Pertinent Test Results: {diagnostics:[id number]}    Ejection Fraction  Lab Results   Component Value Date    EF 46 04/26/2024       Echo EF Estimated  No results found for: \"ECHOEFEST\"    Nuclear Stress Ejection Fraction  No components found for: \"NUCEF\"    Cath Ejection Fraction Quantitative  No results found for: \"CATHEF\"    Condition on Discharge:  ***    Physical Exam at Discharge    Vital Signs  Temp:  [97.8 °F (36.6 °C)-98.4 °F (36.9 °C)] 98.4 °F (36.9 °C)  Heart Rate:  [82-96] 87  Resp:  [13-18] 18  BP: (110-159)/() 151/68    Physical Exam:  Physical Exam    Discharge Disposition  Home or Self Care    Discharge Medications     Discharge Medications        Continue These Medications        Instructions Start Date   albuterol (2.5 MG/3ML) 0.083% nebulizer solution  Commonly known as: PROVENTIL   2.5 mg, Nebulization, Every 4 Hours PRN      albuterol sulfate  (90 Base) MCG/ACT inhaler  Commonly known as: PROVENTIL HFA;VENTOLIN HFA;PROAIR HFA   2 puffs, Inhalation, Every 4 Hours PRN      Aspirin EC Adult Low Dose 81 MG EC tablet  Generic drug: aspirin   81 mg, Oral, Daily      atorvastatin 80 MG tablet  Commonly known as: LIPITOR   80 mg, Oral, Nightly      bumetanide 2 MG tablet  Commonly known as: BUMEX   2 mg, Oral, 2 Times Daily      buPROPion  MG 12 hr tablet  Commonly known as: Wellbutrin SR   150 mg, " Oral, 2 Times Daily      busPIRone 5 MG tablet  Commonly known as: BUSPAR   5 mg, 2 Times Daily      carvedilol 6.25 MG tablet  Commonly known as: COREG   6.25 mg, Oral, 2 Times Daily With Meals      clopidogrel 75 MG tablet  Commonly known as: PLAVIX   75 mg, Oral, Daily      Entresto 24-26 MG tablet  Generic drug: sacubitril-valsartan   1 tablet, Oral, 2 Times Daily      FLUoxetine 20 MG capsule  Commonly known as: PROzac   40 mg, Daily      Fluticasone-Umeclidin-Vilant 200-62.5-25 MCG/ACT inhaler  Commonly known as: TRELEGY ELLIPTA   1 puff, Inhalation, Daily - RT      gabapentin 600 MG tablet  Commonly known as: NEURONTIN   600 mg, 3 Times Daily      HYDROcodone-acetaminophen  MG per tablet  Commonly known as: NORCO   1 tablet, 4 Times Daily      hydrOXYzine pamoate 25 MG capsule  Commonly known as: VISTARIL   25 mg, 3 Times Daily PRN      isosorbide mononitrate 30 MG 24 hr tablet  Commonly known as: IMDUR   30 mg, Oral, Daily      nitroglycerin 0.4 MG SL tablet  Commonly known as: Nitrostat   0.4 mg, Sublingual, Every 5 Minutes PRN, Take no more than 3 doses in 15 minutes.      pantoprazole 40 MG EC tablet  Commonly known as: PROTONIX   40 mg, 2 Times Daily      potassium chloride 10 MEQ CR tablet   20 mEq, Oral, Daily      ranolazine 500 MG 12 hr tablet  Commonly known as: RANEXA   500 mg, Oral, 2 Times Daily      spironolactone 25 MG tablet  Commonly known as: ALDACTONE   12.5 mg, Oral, Every Other Day      topiramate 50 MG tablet  Commonly known as: TOPAMAX   50 mg, Daily      zolpidem 10 MG tablet  Commonly known as: AMBIEN   10 mg, Nightly PRN               Discharge Diet:     Activity at Discharge:     Follow-up Appointments  Future Appointments   Date Time Provider Department Center   10/30/2025 10:00 AM Osman Patrick DO [facility]   11/3/2025  9:00 AM Ricky Patrick DO [facility]         Test Results Pending at Discharge       Osman Patrick  DO  05/07/25  09:47 CDT

## 2025-05-07 NOTE — PLAN OF CARE
Problem: Adult Inpatient Plan of Care  Goal: Plan of Care Review  Outcome: Met  Goal: Patient-Specific Goal (Individualized)  Outcome: Met  Goal: Absence of Hospital-Acquired Illness or Injury  Outcome: Met  Intervention: Identify and Manage Fall Risk  Recent Flowsheet Documentation  Taken 5/7/2025 1200 by Dianna Michaud RN  Safety Promotion/Fall Prevention:   activity supervised   assistive device/personal items within reach   clutter free environment maintained   fall prevention program maintained   nonskid shoes/slippers when out of bed   room organization consistent   safety round/check completed  Taken 5/7/2025 1100 by Dianna Michaud RN  Safety Promotion/Fall Prevention:   activity supervised   assistive device/personal items within reach   clutter free environment maintained   fall prevention program maintained   nonskid shoes/slippers when out of bed   room organization consistent   safety round/check completed  Taken 5/7/2025 1000 by Dianna Michaud RN  Safety Promotion/Fall Prevention:   activity supervised   assistive device/personal items within reach   clutter free environment maintained   fall prevention program maintained   nonskid shoes/slippers when out of bed   room organization consistent   safety round/check completed  Taken 5/7/2025 0900 by Dianna Michaud RN  Safety Promotion/Fall Prevention:   activity supervised   assistive device/personal items within reach   clutter free environment maintained   fall prevention program maintained   nonskid shoes/slippers when out of bed   room organization consistent   safety round/check completed  Taken 5/7/2025 0800 by Dianna Michaud RN  Safety Promotion/Fall Prevention:   activity supervised   assistive device/personal items within reach   clutter free environment maintained   fall prevention program maintained   nonskid shoes/slippers when out of bed   room organization consistent   safety round/check completed  Intervention: Prevent  Skin Injury  Recent Flowsheet Documentation  Taken 5/7/2025 0800 by Dianna Michaud, RN  Body Position: position changed independently  Goal: Optimal Comfort and Wellbeing  Outcome: Met  Goal: Readiness for Transition of Care  Outcome: Met   Goal Outcome Evaluation:

## 2025-05-12 LAB
QT INTERVAL: 368 MS
QTC INTERVAL: 469 MS

## 2025-05-29 RX ORDER — FLUTICASONE FUROATE, UMECLIDINIUM BROMIDE AND VILANTEROL TRIFENATATE 200; 62.5; 25 UG/1; UG/1; UG/1
1 POWDER RESPIRATORY (INHALATION) DAILY
Qty: 60 EACH | Refills: 5 | Status: SHIPPED | OUTPATIENT
Start: 2025-05-29

## 2025-05-29 NOTE — TELEPHONE ENCOUNTER
Received a refill request from State requesting this refill, I tried reaching out to patient to see if this refill was neededbut had to leave a voicemail to call back and let us know.           Requested Prescriptions     Pending Prescriptions Disp Refills    Trelegy Ellipta 200-62.5-25 MCG/ACT inhaler [Pharmacy Med Name: TRELEGY ELLIPTA 200-62.5-25 MCG/ACT INHALATION AEROSOL POWDER BREATH ACTIVATED] 60 each      Sig: INHALE 1 PUFF BY MOUTH DAILY.      Last office visit with prescribing clinician: 4/28/2025   Last telemedicine visit with prescribing clinician: Visit date not found   Next office visit with prescribing clinician: 11/3/2025                         Would you like a call back once the refill request has been completed: [] Yes [] No    If the office needs to give you a call back, can they leave a voicemail: [] Yes [] No    Keiry Lozano MA  05/29/25, 13:22 CDT

## 2025-07-17 RX ORDER — POTASSIUM CHLORIDE 750 MG/1
10 TABLET, EXTENDED RELEASE ORAL DAILY
Qty: 90 TABLET | Refills: 3 | Status: SHIPPED | OUTPATIENT
Start: 2025-07-17

## (undated) DEVICE — ANGIO-SEAL VIP VASCULAR CLOSURE DEVICE: Brand: ANGIO-SEAL

## (undated) DEVICE — PAD, DEFIB, ADULT, RADIOTRANS, PHYSIO: Brand: MEDLINE

## (undated) DEVICE — KT NDL GUIDE STRL 18GA

## (undated) DEVICE — SOLIDIFIER LIQUI LOC PLUS 2000CC

## (undated) DEVICE — PERCLOSE™ PROSTYLE™ SUTURE-MEDIATED CLOSURE AND REPAIR SYSTEM: Brand: PERCLOSE™ PROSTYLE™

## (undated) DEVICE — FR5 INFINITI MULTIPAC: Brand: INFINITI

## (undated) DEVICE — PTCA DILATATION CATHETER: Brand: NC QUANTUM APEX™

## (undated) DEVICE — INFLATION DEVICE: Brand: ENCORE™ 26

## (undated) DEVICE — CANN NASL ETCO2 LO/FLO A/

## (undated) DEVICE — PINNACLE INTRODUCER SHEATH: Brand: PINNACLE

## (undated) DEVICE — 6F .070 XB 3.5 ECO PK: Brand: VISTA BRITE TIP

## (undated) DEVICE — 6F .070 XB 3.5 100CM: Brand: VISTA BRITE TIP

## (undated) DEVICE — COPILOT BLEEDBACK CONTROL VALVE: Brand: COPILOT

## (undated) DEVICE — DEV TORQ GW HOT/PINK

## (undated) DEVICE — NC TREK NEO™ CORONARY DILATATION CATHETER 2.50 X 12 MM / RAPID-EXCHANGE: Brand: NC TREK NEO™

## (undated) DEVICE — NC TREK NEO™ CORONARY DILATATION CATHETER 2.00 MM X 12 MM / RAPID-EXCHANGE: Brand: NC TREK NEO™

## (undated) DEVICE — PTCA DILATATION CATHETER: Brand: NC EMERGE®

## (undated) DEVICE — HI-TORQUE BALANCE MIDDLEWEIGHT UNIVERSAL II GUIDE WIRE STRAIGHT TIP PAK  190 CM: Brand: HI-TORQUE BALANCE MIDDLEWEIGHT UNIVERSAL II

## (undated) DEVICE — MODEL AT P65, P/N 701554-001KIT CONTENTS: HAND CONTROLLER, 3-WAY HIGH-PRESSURE STOPCOCK WITH ROTATING END AND PREMIUM HIGH-PRESSURE TUBING: Brand: ANGIOTOUCH® KIT

## (undated) DEVICE — TOOL INSRT GW MTL OR PLSTC

## (undated) DEVICE — Device: Brand: MEDEX

## (undated) DEVICE — A2000 MULTI-USE SYRINGE KIT, P/N 701277-003KIT CONTENTS: 100ML CONTRAST RESERVOIR AND TUBING WITH CONTRAST SPIKE AND CLAMP: Brand: A2000 MULTI-USE SYRINGE KIT

## (undated) DEVICE — PACLITAXEL-COATED BALLOON CATHETER: Brand: AGENT™

## (undated) DEVICE — SOL IRR NACL 0.9PCT BT 1000ML

## (undated) DEVICE — MODEL BT2000 P/N 700287-012KIT CONTENTS: MANIFOLD WITH SALINE AND CONTRAST PORTS, SALINE TUBING WITH SPIKE AND HAND SYRINGE, TRANSDUCER: Brand: BT2000 AUTOMATED MANIFOLD KIT

## (undated) DEVICE — HI-TORQUE WHISPER ES GUIDE WIRE .014 STRAIGHTTIP 3.0 CM X 190 CM: Brand: HI-TORQUE WHISPER

## (undated) DEVICE — GW AMPLTZ SUPERSTIFF JTIP .035IN 180CM

## (undated) DEVICE — VLV HEMO GUARDIAN INSRT/TOOL W TORQ DEV

## (undated) DEVICE — GW STARTER FXD CORE J .035 3X150CM 3MM

## (undated) DEVICE — CATH F6 INF TL JL 3.5 100 CM: Brand: INFINITI

## (undated) DEVICE — PK CATH CARD 30 CA/4

## (undated) DEVICE — GW STARTER FXD CORE J .035 3X260CM 3MM

## (undated) DEVICE — Device

## (undated) DEVICE — NC TREK NEO™ CORONARY DILATATION CATHETER 2.50 X 8 MM / RAPID-EXCHANGE: Brand: NC TREK NEO™

## (undated) DEVICE — INF TL MULIPACK FR6: Brand: INFINITI

## (undated) DEVICE — SOL IRR NACL 0.9PCT BO 1000ML

## (undated) DEVICE — SOLIDIFIER LIQ LIQUILOC/PLUS W/TREAT 2000CC

## (undated) DEVICE — PTCA DILATATION CATHETER: Brand: EMERGE™

## (undated) DEVICE — SUP ARMBRD ART/LINE BLU

## (undated) DEVICE — DRSNG SURESITE WNDW 4X4.5

## (undated) DEVICE — GW STARTER FXD/CORE PTFE/COAT J/TP/3MM .035IN 10X150CM

## (undated) DEVICE — KT VLV HEMO MAP ACC PLS LG/BORE MTL/INTRO

## (undated) DEVICE — DRAPE,ANGIO,BRACH,STERILE,38X44: Brand: MEDLINE

## (undated) DEVICE — NC TREK NEO™ CORONARY DILATATION CATHETER 2.25 MM X 12 MM / RAPID-EXCHANGE: Brand: NC TREK NEO™

## (undated) DEVICE — DRSNG PRESS SAFEGUARD

## (undated) DEVICE — Device: Brand: PROWATER